# Patient Record
Sex: FEMALE | Race: WHITE | Employment: OTHER | URBAN - METROPOLITAN AREA
[De-identification: names, ages, dates, MRNs, and addresses within clinical notes are randomized per-mention and may not be internally consistent; named-entity substitution may affect disease eponyms.]

---

## 2017-01-01 ENCOUNTER — HOSPITAL ENCOUNTER (OUTPATIENT)
Dept: GENERAL RADIOLOGY | Age: 77
Discharge: HOME | End: 2017-07-12
Attending: INTERNAL MEDICINE
Payer: MEDICARE

## 2017-01-01 ENCOUNTER — APPOINTMENT (OUTPATIENT)
Dept: ULTRASOUND IMAGING | Age: 77
DRG: 167 | End: 2017-01-01
Attending: NURSE PRACTITIONER
Payer: MEDICARE

## 2017-01-01 ENCOUNTER — HOSPITAL ENCOUNTER (INPATIENT)
Age: 77
LOS: 10 days | Discharge: HOSPICE | DRG: 167 | End: 2017-09-25
Attending: INTERNAL MEDICINE | Admitting: INTERNAL MEDICINE
Payer: MEDICARE

## 2017-01-01 ENCOUNTER — HOSPITAL ENCOUNTER (OUTPATIENT)
Dept: LAB | Age: 77
Discharge: HOME | DRG: 167 | End: 2017-09-14
Payer: MEDICARE

## 2017-01-01 ENCOUNTER — APPOINTMENT (OUTPATIENT)
Dept: CT IMAGING | Age: 77
DRG: 167 | End: 2017-01-01
Attending: NURSE PRACTITIONER
Payer: MEDICARE

## 2017-01-01 ENCOUNTER — HOSPITAL ENCOUNTER (OUTPATIENT)
Dept: NUCLEAR MEDICINE | Age: 77
Discharge: HOME | End: 2017-08-14
Attending: INTERNAL MEDICINE
Payer: MEDICARE

## 2017-01-01 ENCOUNTER — HOSPITAL ENCOUNTER (OUTPATIENT)
Dept: NUCLEAR MEDICINE | Age: 77
Discharge: HOME | End: 2017-08-30
Attending: INTERNAL MEDICINE
Payer: MEDICARE

## 2017-01-01 ENCOUNTER — APPOINTMENT (OUTPATIENT)
Dept: GENERAL RADIOLOGY | Age: 77
DRG: 167 | End: 2017-01-01
Attending: INTERNAL MEDICINE
Payer: MEDICARE

## 2017-01-01 ENCOUNTER — HOSPITAL ENCOUNTER (INPATIENT)
Age: 77
LOS: 3 days | End: 2017-09-28
Attending: INTERNAL MEDICINE | Admitting: INTERNAL MEDICINE

## 2017-01-01 ENCOUNTER — HOSPITAL ENCOUNTER (OUTPATIENT)
Dept: GENERAL RADIOLOGY | Age: 77
Discharge: HOME | End: 2017-08-14
Attending: INTERNAL MEDICINE
Payer: MEDICARE

## 2017-01-01 ENCOUNTER — HOSPITAL ENCOUNTER (OUTPATIENT)
Dept: MAMMOGRAPHY | Age: 77
Discharge: HOME | End: 2017-08-14
Attending: INTERNAL MEDICINE
Payer: MEDICARE

## 2017-01-01 ENCOUNTER — HOSPICE ADMISSION (OUTPATIENT)
Dept: HOSPICE | Facility: HOSPICE | Age: 77
End: 2017-01-01
Payer: MEDICARE

## 2017-01-01 VITALS
BODY MASS INDEX: 42.72 KG/M2 | RESPIRATION RATE: 18 BRPM | SYSTOLIC BLOOD PRESSURE: 90 MMHG | TEMPERATURE: 98.6 F | OXYGEN SATURATION: 98 % | HEART RATE: 84 BPM | WEIGHT: 229.8 LBS | DIASTOLIC BLOOD PRESSURE: 63 MMHG

## 2017-01-01 VITALS
HEART RATE: 86 BPM | RESPIRATION RATE: 14 BRPM | DIASTOLIC BLOOD PRESSURE: 42 MMHG | TEMPERATURE: 97.8 F | SYSTOLIC BLOOD PRESSURE: 85 MMHG

## 2017-01-01 DIAGNOSIS — E87.20 METABOLIC ACIDOSIS: ICD-10-CM

## 2017-01-01 DIAGNOSIS — M89.8X9 BONE PAIN: ICD-10-CM

## 2017-01-01 DIAGNOSIS — N18.30 CHRONIC KIDNEY DISEASE (CKD), STAGE III (MODERATE) (HCC): ICD-10-CM

## 2017-01-01 DIAGNOSIS — Z95.0 CARDIAC PACEMAKER: ICD-10-CM

## 2017-01-01 DIAGNOSIS — N17.9 AKI (ACUTE KIDNEY INJURY) (HCC): ICD-10-CM

## 2017-01-01 DIAGNOSIS — E11.9 CONTROLLED TYPE 2 DIABETES MELLITUS WITHOUT COMPLICATION, WITHOUT LONG-TERM CURRENT USE OF INSULIN (HCC): Chronic | ICD-10-CM

## 2017-01-01 DIAGNOSIS — R07.9 LEFT SIDED CHEST PAIN: ICD-10-CM

## 2017-01-01 DIAGNOSIS — N64.4 BREAST PAIN: ICD-10-CM

## 2017-01-01 DIAGNOSIS — R06.00 DYSPNEA: ICD-10-CM

## 2017-01-01 DIAGNOSIS — R05.3 CHRONIC COUGH: ICD-10-CM

## 2017-01-01 DIAGNOSIS — Z87.898 HISTORY OF CHRONIC COUGH: ICD-10-CM

## 2017-01-01 DIAGNOSIS — C34.32 MALIGNANT NEOPLASM OF LOWER LOBE OF LEFT LUNG (HCC): ICD-10-CM

## 2017-01-01 DIAGNOSIS — R91.8 LUNG MASS: ICD-10-CM

## 2017-01-01 DIAGNOSIS — R79.89 ELEVATED SERUM CREATININE: ICD-10-CM

## 2017-01-01 DIAGNOSIS — C80.1 METASTASIS TO BONE OF UNKNOWN PRIMARY (HCC): ICD-10-CM

## 2017-01-01 DIAGNOSIS — R07.81 RIB PAIN: ICD-10-CM

## 2017-01-01 DIAGNOSIS — C79.51 METASTASIS TO BONE OF UNKNOWN PRIMARY (HCC): ICD-10-CM

## 2017-01-01 DIAGNOSIS — C34.92 SMALL CELL LUNG CANCER, LEFT (HCC): ICD-10-CM

## 2017-01-01 DIAGNOSIS — R53.81 DEBILITY: Chronic | ICD-10-CM

## 2017-01-01 DIAGNOSIS — R63.4 WEIGHT LOSS: ICD-10-CM

## 2017-01-01 DIAGNOSIS — J20.9 ACUTE BRONCHITIS, UNSPECIFIED ORGANISM: ICD-10-CM

## 2017-01-01 DIAGNOSIS — E66.01 MORBID OBESITY, UNSPECIFIED OBESITY TYPE (HCC): ICD-10-CM

## 2017-01-01 DIAGNOSIS — R97.8 OTHER ABNORMAL TUMOR MARKERS: ICD-10-CM

## 2017-01-01 DIAGNOSIS — R05.9 COUGH: ICD-10-CM

## 2017-01-01 DIAGNOSIS — Z95.4 S/P AORTIC VALVE REPLACEMENT USING ROSS PROCEDURE: ICD-10-CM

## 2017-01-01 DIAGNOSIS — R53.81 PHYSICAL DEBILITY: ICD-10-CM

## 2017-01-01 DIAGNOSIS — C41.9 METASTATIC BONE CANCER (HCC): ICD-10-CM

## 2017-01-01 DIAGNOSIS — I35.9 AORTIC VALVE DISEASE: ICD-10-CM

## 2017-01-01 DIAGNOSIS — E83.52 HYPERCALCEMIA: ICD-10-CM

## 2017-01-01 LAB
ALBUMIN SERPL-MCNC: 2.4 G/DL (ref 3.2–4.6)
ALBUMIN SERPL-MCNC: 2.5 G/DL (ref 3.2–4.6)
ALBUMIN SERPL-MCNC: 2.6 G/DL (ref 3.2–4.6)
ALBUMIN SERPL-MCNC: 2.8 G/DL (ref 3.2–4.6)
ALBUMIN SERPL-MCNC: 2.9 G/DL (ref 3.2–4.6)
ALBUMIN SERPL-MCNC: 2.9 G/DL (ref 3.2–4.6)
ALBUMIN SERPL-MCNC: 3.7 G/DL (ref 3.2–4.6)
ALBUMIN SERPL-MCNC: 3.8 G/DL (ref 3.2–4.6)
ALBUMIN/GLOB SERPL: 0.7 {RATIO} (ref 1.2–3.5)
ALBUMIN/GLOB SERPL: 0.8 {RATIO} (ref 1.2–3.5)
ALBUMIN/GLOB SERPL: 0.9 {RATIO}
ALBUMIN/GLOB SERPL: 0.9 {RATIO} (ref 1.2–3.5)
ALP SERPL-CCNC: 195 U/L (ref 50–136)
ALP SERPL-CCNC: 203 U/L (ref 50–136)
ALP SERPL-CCNC: 206 U/L (ref 50–136)
ALP SERPL-CCNC: 215 U/L (ref 50–136)
ALP SERPL-CCNC: 218 U/L (ref 50–136)
ALP SERPL-CCNC: 226 U/L (ref 50–136)
ALP SERPL-CCNC: 230 U/L (ref 50–136)
ALP SERPL-CCNC: 236 U/L (ref 50–136)
ALP SERPL-CCNC: 239 U/L (ref 50–136)
ALP SERPL-CCNC: 257 U/L (ref 50–136)
ALT SERPL-CCNC: 37 U/L (ref 12–65)
ALT SERPL-CCNC: 39 U/L (ref 12–65)
ALT SERPL-CCNC: 42 U/L (ref 12–65)
ALT SERPL-CCNC: 46 U/L (ref 12–65)
ALT SERPL-CCNC: 48 U/L (ref 12–65)
ALT SERPL-CCNC: 50 U/L (ref 12–65)
ALT SERPL-CCNC: 51 U/L (ref 12–65)
ALT SERPL-CCNC: 52 U/L (ref 12–65)
ALT SERPL-CCNC: 53 U/L (ref 12–65)
ALT SERPL-CCNC: 54 U/L (ref 12–65)
AMORPH CRY URNS QL MICRO: ABNORMAL
ANION GAP SERPL CALC-SCNC: 10 MMOL/L (ref 7–16)
ANION GAP SERPL CALC-SCNC: 10 MMOL/L (ref 7–16)
ANION GAP SERPL CALC-SCNC: 11 MMOL/L (ref 7–16)
ANION GAP SERPL CALC-SCNC: 12 MMOL/L
ANION GAP SERPL CALC-SCNC: 12 MMOL/L (ref 7–16)
ANION GAP SERPL CALC-SCNC: 9 MMOL/L (ref 7–16)
APPEARANCE UR: ABNORMAL
AST SERPL-CCNC: 100 U/L (ref 15–37)
AST SERPL-CCNC: 105 U/L (ref 15–37)
AST SERPL-CCNC: 108 U/L (ref 15–37)
AST SERPL-CCNC: 110 U/L (ref 15–37)
AST SERPL-CCNC: 112 U/L (ref 15–37)
AST SERPL-CCNC: 114 U/L (ref 15–37)
AST SERPL-CCNC: 116 U/L (ref 15–37)
AST SERPL-CCNC: 122 U/L (ref 15–37)
AST SERPL-CCNC: 89 U/L (ref 15–37)
AST SERPL-CCNC: 99 U/L (ref 15–37)
BACTERIA SPEC CULT: NORMAL
BACTERIA URNS QL MICRO: 0 /HPF
BASOPHILS # BLD: 0 K/UL (ref 0–0.2)
BASOPHILS # BLD: 0.1 K/UL (ref 0–0.2)
BASOPHILS # BLD: 0.1 K/UL (ref 0–0.2)
BASOPHILS NFR BLD: 0 % (ref 0–2)
BASOPHILS NFR BLD: 1 % (ref 0–2)
BASOPHILS NFR BLD: 1 % (ref 0–2)
BILIRUB SERPL-MCNC: 0.5 MG/DL (ref 0.2–1.1)
BILIRUB SERPL-MCNC: 0.6 MG/DL (ref 0.2–1.1)
BILIRUB SERPL-MCNC: 0.7 MG/DL (ref 0.2–1.1)
BILIRUB SERPL-MCNC: 0.7 MG/DL (ref 0.2–1.1)
BILIRUB SERPL-MCNC: 0.8 MG/DL (ref 0.2–1.1)
BILIRUB UR QL: NEGATIVE
BNP SERPL-MCNC: 153 PG/ML
BUN SERPL-MCNC: 13 MG/DL (ref 8–23)
BUN SERPL-MCNC: 15 MG/DL (ref 8–23)
BUN SERPL-MCNC: 18 MG/DL (ref 8–23)
BUN SERPL-MCNC: 18 MG/DL (ref 8–23)
BUN SERPL-MCNC: 19 MG/DL (ref 8–23)
BUN SERPL-MCNC: 22 MG/DL (ref 8–23)
BUN SERPL-MCNC: 24 MG/DL (ref 8–23)
BUN SERPL-MCNC: 28 MG/DL (ref 8–23)
BUN SERPL-MCNC: 34 MG/DL (ref 8–23)
BUN SERPL-MCNC: 39 MG/DL (ref 8–23)
BUN SERPL-MCNC: 44 MG/DL (ref 8–23)
BUN SERPL-MCNC: 47 MG/DL (ref 8–23)
CALCIUM SERPL-MCNC: 10 MG/DL (ref 8.3–10.4)
CALCIUM SERPL-MCNC: 11.5 MG/DL (ref 8.3–10.4)
CALCIUM SERPL-MCNC: 11.6 MG/DL (ref 8.3–10.4)
CALCIUM SERPL-MCNC: 11.7 MG/DL (ref 8.3–10.4)
CALCIUM SERPL-MCNC: 13.2 MG/DL (ref 8.3–10.4)
CALCIUM SERPL-MCNC: 14.1 MG/DL (ref 8.3–10.4)
CALCIUM SERPL-MCNC: 7.1 MG/DL (ref 8.3–10.4)
CALCIUM SERPL-MCNC: 7.2 MG/DL (ref 8.3–10.4)
CALCIUM SERPL-MCNC: 7.7 MG/DL (ref 8.3–10.4)
CALCIUM SERPL-MCNC: 7.8 MG/DL (ref 8.3–10.4)
CALCIUM SERPL-MCNC: 8.4 MG/DL (ref 8.3–10.4)
CALCIUM SERPL-MCNC: 8.4 MG/DL (ref 8.3–10.4)
CANCER AG19-9 SERPL-ACNC: 800.8 U/ML (ref 2–37)
CASTS URNS QL MICRO: ABNORMAL /LPF
CEA SERPL-MCNC: 2016.6 NG/ML (ref 0–3)
CHLORIDE SERPL-SCNC: 104 MMOL/L (ref 98–107)
CHLORIDE SERPL-SCNC: 105 MMOL/L (ref 98–107)
CHLORIDE SERPL-SCNC: 107 MMOL/L (ref 98–107)
CHLORIDE SERPL-SCNC: 109 MMOL/L (ref 98–107)
CHLORIDE SERPL-SCNC: 110 MMOL/L (ref 98–107)
CHLORIDE SERPL-SCNC: 110 MMOL/L (ref 98–107)
CHLORIDE SERPL-SCNC: 111 MMOL/L (ref 98–107)
CHLORIDE SERPL-SCNC: 112 MMOL/L (ref 98–107)
CHLORIDE SERPL-SCNC: 112 MMOL/L (ref 98–107)
CHLORIDE SERPL-SCNC: 113 MMOL/L (ref 98–107)
CHLORIDE SERPL-SCNC: 114 MMOL/L (ref 98–107)
CHLORIDE SERPL-SCNC: 117 MMOL/L (ref 98–107)
CO2 SERPL-SCNC: 17 MMOL/L (ref 21–32)
CO2 SERPL-SCNC: 18 MMOL/L (ref 21–32)
CO2 SERPL-SCNC: 18 MMOL/L (ref 21–32)
CO2 SERPL-SCNC: 19 MMOL/L (ref 21–32)
CO2 SERPL-SCNC: 20 MMOL/L (ref 21–32)
CO2 SERPL-SCNC: 21 MMOL/L (ref 21–32)
CO2 SERPL-SCNC: 21 MMOL/L (ref 21–32)
CO2 SERPL-SCNC: 22 MMOL/L (ref 21–32)
CO2 SERPL-SCNC: 22 MMOL/L (ref 21–32)
COLOR UR: YELLOW
CREAT SERPL-MCNC: 0.92 MG/DL (ref 0.6–1)
CREAT SERPL-MCNC: 1.19 MG/DL (ref 0.6–1)
CREAT SERPL-MCNC: 1.2 MG/DL (ref 0.6–1)
CREAT SERPL-MCNC: 1.27 MG/DL (ref 0.6–1)
CREAT SERPL-MCNC: 1.3 MG/DL (ref 0.6–1)
CREAT SERPL-MCNC: 1.34 MG/DL (ref 0.6–1)
CREAT SERPL-MCNC: 1.68 MG/DL (ref 0.6–1)
CREAT SERPL-MCNC: 1.69 MG/DL (ref 0.6–1)
CREAT SERPL-MCNC: 2.1 MG/DL (ref 0.6–1)
CREAT SERPL-MCNC: 2.23 MG/DL (ref 0.6–1)
CREAT SERPL-MCNC: 2.39 MG/DL (ref 0.6–1)
CREAT SERPL-MCNC: 2.95 MG/DL (ref 0.6–1)
DIFFERENTIAL METHOD BLD: ABNORMAL
EOSINOPHIL # BLD: 0.1 K/UL (ref 0–0.8)
EOSINOPHIL # BLD: 0.2 K/UL (ref 0–0.8)
EOSINOPHIL # BLD: 0.2 K/UL (ref 0–0.8)
EOSINOPHIL # BLD: 0.3 K/UL (ref 0–0.8)
EOSINOPHIL NFR BLD: 1 % (ref 0.5–7.8)
EOSINOPHIL NFR BLD: 2 % (ref 0.5–7.8)
EOSINOPHIL NFR BLD: 3 % (ref 0.5–7.8)
EPI CELLS #/AREA URNS HPF: ABNORMAL /HPF
ERYTHROCYTE [DISTWIDTH] IN BLOOD BY AUTOMATED COUNT: 14.6 % (ref 11.9–14.6)
ERYTHROCYTE [DISTWIDTH] IN BLOOD BY AUTOMATED COUNT: 15.1 % (ref 11.9–14.6)
ERYTHROCYTE [DISTWIDTH] IN BLOOD BY AUTOMATED COUNT: 15.7 % (ref 11.9–14.6)
ERYTHROCYTE [DISTWIDTH] IN BLOOD BY AUTOMATED COUNT: 15.9 % (ref 11.9–14.6)
ERYTHROCYTE [DISTWIDTH] IN BLOOD BY AUTOMATED COUNT: 15.9 % (ref 11.9–14.6)
ERYTHROCYTE [DISTWIDTH] IN BLOOD BY AUTOMATED COUNT: 16.1 % (ref 11.9–14.6)
ERYTHROCYTE [DISTWIDTH] IN BLOOD BY AUTOMATED COUNT: 16.5 % (ref 11.9–14.6)
ERYTHROCYTE [DISTWIDTH] IN BLOOD BY AUTOMATED COUNT: 16.8 % (ref 11.9–14.6)
GLOBULIN SER CALC-MCNC: 3.1 G/DL (ref 2.3–3.5)
GLOBULIN SER CALC-MCNC: 3.3 G/DL (ref 2.3–3.5)
GLOBULIN SER CALC-MCNC: 3.5 G/DL (ref 2.3–3.5)
GLOBULIN SER CALC-MCNC: 3.5 G/DL (ref 2.3–3.5)
GLOBULIN SER CALC-MCNC: 3.6 G/DL (ref 2.3–3.5)
GLOBULIN SER CALC-MCNC: 3.6 G/DL (ref 2.3–3.5)
GLOBULIN SER CALC-MCNC: 3.8 G/DL (ref 2.3–3.5)
GLOBULIN SER CALC-MCNC: 3.9 G/DL (ref 2.3–3.5)
GLOBULIN SER CALC-MCNC: 4.1 G/DL
GLOBULIN SER CALC-MCNC: 4.3 G/DL (ref 2.3–3.5)
GLUCOSE BLD STRIP.AUTO-MCNC: 100 MG/DL (ref 65–100)
GLUCOSE BLD STRIP.AUTO-MCNC: 102 MG/DL (ref 65–100)
GLUCOSE BLD STRIP.AUTO-MCNC: 103 MG/DL (ref 65–100)
GLUCOSE BLD STRIP.AUTO-MCNC: 104 MG/DL (ref 65–100)
GLUCOSE BLD STRIP.AUTO-MCNC: 105 MG/DL (ref 65–100)
GLUCOSE BLD STRIP.AUTO-MCNC: 107 MG/DL (ref 65–100)
GLUCOSE BLD STRIP.AUTO-MCNC: 109 MG/DL (ref 65–100)
GLUCOSE BLD STRIP.AUTO-MCNC: 111 MG/DL (ref 65–100)
GLUCOSE BLD STRIP.AUTO-MCNC: 113 MG/DL (ref 65–100)
GLUCOSE BLD STRIP.AUTO-MCNC: 114 MG/DL (ref 65–100)
GLUCOSE BLD STRIP.AUTO-MCNC: 115 MG/DL (ref 65–100)
GLUCOSE BLD STRIP.AUTO-MCNC: 117 MG/DL (ref 65–100)
GLUCOSE BLD STRIP.AUTO-MCNC: 119 MG/DL (ref 65–100)
GLUCOSE BLD STRIP.AUTO-MCNC: 124 MG/DL (ref 65–100)
GLUCOSE BLD STRIP.AUTO-MCNC: 129 MG/DL (ref 65–100)
GLUCOSE BLD STRIP.AUTO-MCNC: 141 MG/DL (ref 65–100)
GLUCOSE BLD STRIP.AUTO-MCNC: 142 MG/DL (ref 65–100)
GLUCOSE BLD STRIP.AUTO-MCNC: 142 MG/DL (ref 65–100)
GLUCOSE BLD STRIP.AUTO-MCNC: 145 MG/DL (ref 65–100)
GLUCOSE BLD STRIP.AUTO-MCNC: 149 MG/DL (ref 65–100)
GLUCOSE BLD STRIP.AUTO-MCNC: 150 MG/DL (ref 65–100)
GLUCOSE BLD STRIP.AUTO-MCNC: 155 MG/DL (ref 65–100)
GLUCOSE BLD STRIP.AUTO-MCNC: 156 MG/DL (ref 65–100)
GLUCOSE BLD STRIP.AUTO-MCNC: 165 MG/DL (ref 65–100)
GLUCOSE BLD STRIP.AUTO-MCNC: 180 MG/DL (ref 65–100)
GLUCOSE BLD STRIP.AUTO-MCNC: 180 MG/DL (ref 65–100)
GLUCOSE BLD STRIP.AUTO-MCNC: 204 MG/DL (ref 65–100)
GLUCOSE BLD STRIP.AUTO-MCNC: 204 MG/DL (ref 65–100)
GLUCOSE BLD STRIP.AUTO-MCNC: 205 MG/DL (ref 65–100)
GLUCOSE BLD STRIP.AUTO-MCNC: 69 MG/DL (ref 65–100)
GLUCOSE BLD STRIP.AUTO-MCNC: 74 MG/DL (ref 65–100)
GLUCOSE BLD STRIP.AUTO-MCNC: 77 MG/DL (ref 65–100)
GLUCOSE BLD STRIP.AUTO-MCNC: 81 MG/DL (ref 65–100)
GLUCOSE BLD STRIP.AUTO-MCNC: 85 MG/DL (ref 65–100)
GLUCOSE BLD STRIP.AUTO-MCNC: 88 MG/DL (ref 65–100)
GLUCOSE BLD STRIP.AUTO-MCNC: 95 MG/DL (ref 65–100)
GLUCOSE BLD STRIP.AUTO-MCNC: 96 MG/DL (ref 65–100)
GLUCOSE BLD STRIP.AUTO-MCNC: 97 MG/DL (ref 65–100)
GLUCOSE SERPL-MCNC: 100 MG/DL (ref 65–100)
GLUCOSE SERPL-MCNC: 107 MG/DL (ref 65–100)
GLUCOSE SERPL-MCNC: 121 MG/DL (ref 65–100)
GLUCOSE SERPL-MCNC: 125 MG/DL (ref 65–100)
GLUCOSE SERPL-MCNC: 169 MG/DL (ref 65–100)
GLUCOSE SERPL-MCNC: 178 MG/DL (ref 65–100)
GLUCOSE SERPL-MCNC: 73 MG/DL (ref 65–100)
GLUCOSE SERPL-MCNC: 75 MG/DL (ref 65–100)
GLUCOSE SERPL-MCNC: 80 MG/DL (ref 65–100)
GLUCOSE SERPL-MCNC: 80 MG/DL (ref 65–100)
GLUCOSE SERPL-MCNC: 90 MG/DL (ref 65–100)
GLUCOSE SERPL-MCNC: 97 MG/DL (ref 65–100)
GLUCOSE UR STRIP.AUTO-MCNC: NEGATIVE MG/DL
HCT VFR BLD AUTO: 28.4 % (ref 35.8–46.3)
HCT VFR BLD AUTO: 29.2 % (ref 35.8–46.3)
HCT VFR BLD AUTO: 30.8 % (ref 35.8–46.3)
HCT VFR BLD AUTO: 31.2 % (ref 35.8–46.3)
HCT VFR BLD AUTO: 31.7 % (ref 35.8–46.3)
HCT VFR BLD AUTO: 32.1 % (ref 35.8–46.3)
HCT VFR BLD AUTO: 35 % (ref 35.8–46.3)
HCT VFR BLD AUTO: 36.2 % (ref 35.8–46.3)
HGB BLD-MCNC: 10.2 G/DL (ref 11.7–15.4)
HGB BLD-MCNC: 10.3 G/DL (ref 11.7–15.4)
HGB BLD-MCNC: 10.3 G/DL (ref 11.7–15.4)
HGB BLD-MCNC: 10.7 G/DL (ref 11.7–15.4)
HGB BLD-MCNC: 11.9 G/DL (ref 11.7–15.4)
HGB BLD-MCNC: 12.3 G/DL (ref 11.7–15.4)
HGB BLD-MCNC: 9.6 G/DL (ref 11.7–15.4)
HGB BLD-MCNC: 9.7 G/DL (ref 11.7–15.4)
HGB UR QL STRIP: NEGATIVE
IMM GRANULOCYTES # BLD: 0 K/UL (ref 0–0.5)
IMM GRANULOCYTES # BLD: 0.1 K/UL (ref 0–0.5)
IMM GRANULOCYTES # BLD: 0.1 K/UL (ref 0–0.5)
IMM GRANULOCYTES NFR BLD: 0.3 % (ref 0–5)
IMM GRANULOCYTES NFR BLD: 0.4 % (ref 0–5)
IMM GRANULOCYTES NFR BLD: 0.5 % (ref 0–5)
IMM GRANULOCYTES NFR BLD: 0.7 % (ref 0–5)
IMM GRANULOCYTES NFR BLD: 1.2 % (ref 0–5)
KETONES UR QL STRIP.AUTO: NEGATIVE MG/DL
LEUKOCYTE ESTERASE UR QL STRIP.AUTO: ABNORMAL
LYMPHOCYTES # BLD: 1 K/UL (ref 0.5–4.6)
LYMPHOCYTES # BLD: 1.2 K/UL (ref 0.5–4.6)
LYMPHOCYTES # BLD: 1.3 K/UL (ref 0.5–4.6)
LYMPHOCYTES # BLD: 1.9 K/UL (ref 0.5–4.6)
LYMPHOCYTES # BLD: 1.9 K/UL (ref 0.5–4.6)
LYMPHOCYTES # BLD: 2 K/UL (ref 0.5–4.6)
LYMPHOCYTES # BLD: 2.5 K/UL (ref 0.5–4.6)
LYMPHOCYTES # BLD: 2.6 K/UL (ref 0.5–4.6)
LYMPHOCYTES NFR BLD: 18 % (ref 13–44)
LYMPHOCYTES NFR BLD: 21 % (ref 13–44)
LYMPHOCYTES NFR BLD: 21 % (ref 13–44)
LYMPHOCYTES NFR BLD: 22 % (ref 13–44)
LYMPHOCYTES NFR BLD: 22 % (ref 13–44)
LYMPHOCYTES NFR BLD: 23 % (ref 13–44)
LYMPHOCYTES NFR BLD: 26 % (ref 13–44)
LYMPHOCYTES NFR BLD: 27 % (ref 13–44)
MAGNESIUM SERPL-MCNC: 1.5 MG/DL (ref 1.8–2.4)
MAGNESIUM SERPL-MCNC: 1.5 MG/DL (ref 1.8–2.4)
MAGNESIUM SERPL-MCNC: 1.6 MG/DL (ref 1.8–2.4)
MAGNESIUM SERPL-MCNC: 1.8 MG/DL (ref 1.8–2.4)
MAGNESIUM SERPL-MCNC: 2 MG/DL (ref 1.8–2.4)
MAGNESIUM SERPL-MCNC: 2 MG/DL (ref 1.8–2.4)
MAGNESIUM SERPL-MCNC: 2.1 MG/DL (ref 1.8–2.4)
MAGNESIUM SERPL-MCNC: 2.3 MG/DL (ref 1.8–2.4)
MCH RBC QN AUTO: 30.7 PG (ref 26.1–32.9)
MCH RBC QN AUTO: 30.8 PG (ref 26.1–32.9)
MCH RBC QN AUTO: 30.9 PG (ref 26.1–32.9)
MCH RBC QN AUTO: 30.9 PG (ref 26.1–32.9)
MCH RBC QN AUTO: 31.2 PG (ref 26.1–32.9)
MCH RBC QN AUTO: 31.5 PG (ref 26.1–32.9)
MCHC RBC AUTO-ENTMCNC: 32.5 G/DL (ref 31.4–35)
MCHC RBC AUTO-ENTMCNC: 32.7 G/DL (ref 31.4–35)
MCHC RBC AUTO-ENTMCNC: 33.2 G/DL (ref 31.4–35)
MCHC RBC AUTO-ENTMCNC: 33.3 G/DL (ref 31.4–35)
MCHC RBC AUTO-ENTMCNC: 33.4 G/DL (ref 31.4–35)
MCHC RBC AUTO-ENTMCNC: 33.8 G/DL (ref 31.4–35)
MCHC RBC AUTO-ENTMCNC: 34 G/DL (ref 31.4–35)
MCHC RBC AUTO-ENTMCNC: 34 G/DL (ref 31.4–35)
MCV RBC AUTO: 91 FL (ref 79.6–97.8)
MCV RBC AUTO: 91.3 FL (ref 79.6–97.8)
MCV RBC AUTO: 91.9 FL (ref 79.6–97.8)
MCV RBC AUTO: 92.4 FL (ref 79.6–97.8)
MCV RBC AUTO: 92.6 FL (ref 79.6–97.8)
MCV RBC AUTO: 93.6 FL (ref 79.6–97.8)
MCV RBC AUTO: 94.3 FL (ref 79.6–97.8)
MCV RBC AUTO: 94.3 FL (ref 79.6–97.8)
MM INDURATION POC: 0 MM (ref 0–5)
MONOCYTES # BLD: 0.6 K/UL (ref 0.1–1.3)
MONOCYTES # BLD: 0.7 K/UL (ref 0.1–1.3)
MONOCYTES # BLD: 0.8 K/UL (ref 0.1–1.3)
MONOCYTES # BLD: 1 K/UL (ref 0.1–1.3)
MONOCYTES # BLD: 1.5 K/UL (ref 0.1–1.3)
MONOCYTES # BLD: 1.8 K/UL (ref 0.1–1.3)
MONOCYTES NFR BLD: 10 % (ref 4–12)
MONOCYTES NFR BLD: 11 % (ref 4–12)
MONOCYTES NFR BLD: 13 % (ref 4–12)
MONOCYTES NFR BLD: 13 % (ref 4–12)
MONOCYTES NFR BLD: 16 % (ref 4–12)
MONOCYTES NFR BLD: 18 % (ref 4–12)
MUCOUS THREADS URNS QL MICRO: ABNORMAL /LPF
NEUTS SEG # BLD: 3.5 K/UL (ref 1.7–8.2)
NEUTS SEG # BLD: 3.6 K/UL (ref 1.7–8.2)
NEUTS SEG # BLD: 3.8 K/UL (ref 1.7–8.2)
NEUTS SEG # BLD: 5.4 K/UL (ref 1.7–8.2)
NEUTS SEG # BLD: 5.5 K/UL (ref 1.7–8.2)
NEUTS SEG # BLD: 5.6 K/UL (ref 1.7–8.2)
NEUTS SEG NFR BLD: 51 % (ref 43–78)
NEUTS SEG NFR BLD: 59 % (ref 43–78)
NEUTS SEG NFR BLD: 60 % (ref 43–78)
NEUTS SEG NFR BLD: 62 % (ref 43–78)
NEUTS SEG NFR BLD: 63 % (ref 43–78)
NEUTS SEG NFR BLD: 66 % (ref 43–78)
NEUTS SEG NFR BLD: 66 % (ref 43–78)
NEUTS SEG NFR BLD: 68 % (ref 43–78)
NITRITE UR QL STRIP.AUTO: NEGATIVE
NRBC # BLD: 0 K/UL (ref 0–0.2)
PH UR STRIP: 5 [PH] (ref 5–9)
PLATELET # BLD AUTO: 123 K/UL (ref 150–450)
PLATELET # BLD AUTO: 124 K/UL (ref 150–450)
PLATELET # BLD AUTO: 131 K/UL (ref 150–450)
PLATELET # BLD AUTO: 150 K/UL (ref 150–450)
PLATELET # BLD AUTO: 155 K/UL (ref 150–450)
PLATELET # BLD AUTO: 82 K/UL (ref 150–450)
PLATELET # BLD AUTO: 87 K/UL (ref 150–450)
PLATELET # BLD AUTO: 98 K/UL (ref 150–450)
PMV BLD AUTO: 10.2 FL (ref 10.8–14.1)
PMV BLD AUTO: 10.3 FL (ref 10.8–14.1)
PMV BLD AUTO: 10.5 FL (ref 10.8–14.1)
PMV BLD AUTO: 10.6 FL (ref 10.8–14.1)
PMV BLD AUTO: 10.7 FL (ref 10.8–14.1)
PMV BLD AUTO: 9.8 FL (ref 10.8–14.1)
POTASSIUM SERPL-SCNC: 3.5 MMOL/L (ref 3.5–5.1)
POTASSIUM SERPL-SCNC: 3.8 MMOL/L (ref 3.5–5.1)
POTASSIUM SERPL-SCNC: 3.9 MMOL/L (ref 3.5–5.1)
POTASSIUM SERPL-SCNC: 4 MMOL/L (ref 3.5–5.1)
POTASSIUM SERPL-SCNC: 4 MMOL/L (ref 3.5–5.1)
POTASSIUM SERPL-SCNC: 4.1 MMOL/L (ref 3.5–5.1)
POTASSIUM SERPL-SCNC: 4.2 MMOL/L (ref 3.5–5.1)
POTASSIUM SERPL-SCNC: 4.3 MMOL/L (ref 3.5–5.1)
PPD POC: NEGATIVE NEGATIVE
PROT SERPL-MCNC: 5.5 G/DL (ref 6.3–8.2)
PROT SERPL-MCNC: 5.9 G/DL (ref 6.3–8.2)
PROT SERPL-MCNC: 6 G/DL (ref 6.3–8.2)
PROT SERPL-MCNC: 6.2 G/DL (ref 6.3–8.2)
PROT SERPL-MCNC: 6.4 G/DL (ref 6.3–8.2)
PROT SERPL-MCNC: 6.8 G/DL (ref 6.3–8.2)
PROT SERPL-MCNC: 7.8 G/DL (ref 6.3–8.2)
PROT SERPL-MCNC: 8.1 G/DL (ref 6.3–8.2)
PROT UR STRIP-MCNC: NEGATIVE MG/DL
RBC # BLD AUTO: 3.11 M/UL (ref 4.05–5.25)
RBC # BLD AUTO: 3.16 M/UL (ref 4.05–5.25)
RBC # BLD AUTO: 3.31 M/UL (ref 4.05–5.25)
RBC # BLD AUTO: 3.35 M/UL (ref 4.05–5.25)
RBC # BLD AUTO: 3.36 M/UL (ref 4.05–5.25)
RBC # BLD AUTO: 3.43 M/UL (ref 4.05–5.25)
RBC # BLD AUTO: 3.78 M/UL (ref 4.05–5.25)
RBC # BLD AUTO: 3.98 M/UL (ref 4.05–5.25)
RBC #/AREA URNS HPF: ABNORMAL /HPF
SERVICE CMNT-IMP: NORMAL
SODIUM SERPL-SCNC: 135 MMOL/L (ref 136–145)
SODIUM SERPL-SCNC: 136 MMOL/L (ref 136–145)
SODIUM SERPL-SCNC: 138 MMOL/L (ref 136–145)
SODIUM SERPL-SCNC: 140 MMOL/L (ref 136–145)
SODIUM SERPL-SCNC: 140 MMOL/L (ref 136–145)
SODIUM SERPL-SCNC: 141 MMOL/L (ref 136–145)
SODIUM SERPL-SCNC: 142 MMOL/L (ref 136–145)
SODIUM SERPL-SCNC: 143 MMOL/L (ref 136–145)
SODIUM SERPL-SCNC: 143 MMOL/L (ref 136–145)
SODIUM SERPL-SCNC: 145 MMOL/L (ref 136–145)
SP GR UR REFRACTOMETRY: 1.02 (ref 1–1.02)
UROBILINOGEN UR QL STRIP.AUTO: 0.2 EU/DL (ref 0.2–1)
WBC # BLD AUTO: 10.2 K/UL (ref 4.3–11.1)
WBC # BLD AUTO: 5.2 K/UL (ref 4.3–11.1)
WBC # BLD AUTO: 5.7 K/UL (ref 4.3–11.1)
WBC # BLD AUTO: 6.1 K/UL (ref 4.3–11.1)
WBC # BLD AUTO: 8.3 K/UL (ref 4.3–11.1)
WBC # BLD AUTO: 8.3 K/UL (ref 4.3–11.1)
WBC # BLD AUTO: 9.3 K/UL (ref 4.3–11.1)
WBC # BLD AUTO: 9.5 K/UL (ref 4.3–11.1)
WBC URNS QL MICRO: ABNORMAL /HPF

## 2017-01-01 PROCEDURE — 93971 EXTREMITY STUDY: CPT

## 2017-01-01 PROCEDURE — 99233 SBSQ HOSP IP/OBS HIGH 50: CPT | Performed by: INTERNAL MEDICINE

## 2017-01-01 PROCEDURE — 77030003406 HC NDL ASPIR BIOP OCOA -C: Performed by: INTERNAL MEDICINE

## 2017-01-01 PROCEDURE — 85025 COMPLETE CBC W/AUTO DIFF WBC: CPT | Performed by: NURSE PRACTITIONER

## 2017-01-01 PROCEDURE — 74011250636 HC RX REV CODE- 250/636: Performed by: NURSE PRACTITIONER

## 2017-01-01 PROCEDURE — 82962 GLUCOSE BLOOD TEST: CPT

## 2017-01-01 PROCEDURE — 78306 BONE IMAGING WHOLE BODY: CPT

## 2017-01-01 PROCEDURE — 77010033678 HC OXYGEN DAILY

## 2017-01-01 PROCEDURE — 80053 COMPREHEN METABOLIC PANEL: CPT | Performed by: NURSE PRACTITIONER

## 2017-01-01 PROCEDURE — 99232 SBSQ HOSP IP/OBS MODERATE 35: CPT | Performed by: INTERNAL MEDICINE

## 2017-01-01 PROCEDURE — 76040000025: Performed by: INTERNAL MEDICINE

## 2017-01-01 PROCEDURE — 74011250637 HC RX REV CODE- 250/637: Performed by: INTERNAL MEDICINE

## 2017-01-01 PROCEDURE — 74011250637 HC RX REV CODE- 250/637: Performed by: NURSE PRACTITIONER

## 2017-01-01 PROCEDURE — 99153 MOD SED SAME PHYS/QHP EA: CPT | Performed by: INTERNAL MEDICINE

## 2017-01-01 PROCEDURE — 74011250636 HC RX REV CODE- 250/636: Performed by: INTERNAL MEDICINE

## 2017-01-01 PROCEDURE — 65270000029 HC RM PRIVATE

## 2017-01-01 PROCEDURE — 94760 N-INVAS EAR/PLS OXIMETRY 1: CPT

## 2017-01-01 PROCEDURE — 74011636320 HC RX REV CODE- 636/320: Performed by: INTERNAL MEDICINE

## 2017-01-01 PROCEDURE — 74011250636 HC RX REV CODE- 250/636

## 2017-01-01 PROCEDURE — 83735 ASSAY OF MAGNESIUM: CPT | Performed by: NURSE PRACTITIONER

## 2017-01-01 PROCEDURE — 88342 IMHCHEM/IMCYTCHM 1ST ANTB: CPT | Performed by: INTERNAL MEDICINE

## 2017-01-01 PROCEDURE — 74011000258 HC RX REV CODE- 258: Performed by: INTERNAL MEDICINE

## 2017-01-01 PROCEDURE — 99223 1ST HOSP IP/OBS HIGH 75: CPT | Performed by: INTERNAL MEDICINE

## 2017-01-01 PROCEDURE — 36592 COLLECT BLOOD FROM PICC: CPT

## 2017-01-01 PROCEDURE — 80048 BASIC METABOLIC PNL TOTAL CA: CPT | Performed by: NURSE PRACTITIONER

## 2017-01-01 PROCEDURE — 99221 1ST HOSP IP/OBS SF/LOW 40: CPT | Performed by: INTERNAL MEDICINE

## 2017-01-01 PROCEDURE — 74011000250 HC RX REV CODE- 250: Performed by: INTERNAL MEDICINE

## 2017-01-01 PROCEDURE — 71020 XR CHEST PA LAT: CPT

## 2017-01-01 PROCEDURE — 71260 CT THORAX DX C+: CPT

## 2017-01-01 PROCEDURE — 07B74ZX EXCISION OF THORAX LYMPHATIC, PERCUTANEOUS ENDOSCOPIC APPROACH, DIAGNOSTIC: ICD-10-PCS | Performed by: INTERNAL MEDICINE

## 2017-01-01 PROCEDURE — 88305 TISSUE EXAM BY PATHOLOGIST: CPT | Performed by: INTERNAL MEDICINE

## 2017-01-01 PROCEDURE — 74011000250 HC RX REV CODE- 250: Performed by: NURSE PRACTITIONER

## 2017-01-01 PROCEDURE — 71010 XR CHEST SNGL V: CPT

## 2017-01-01 PROCEDURE — 99239 HOSP IP/OBS DSCHRG MGMT >30: CPT | Performed by: INTERNAL MEDICINE

## 2017-01-01 PROCEDURE — 97162 PT EVAL MOD COMPLEX 30 MIN: CPT

## 2017-01-01 PROCEDURE — 74011636637 HC RX REV CODE- 636/637: Performed by: NURSE PRACTITIONER

## 2017-01-01 PROCEDURE — 3336500001 HSPC ELECTION

## 2017-01-01 PROCEDURE — 74011000258 HC RX REV CODE- 258: Performed by: NURSE PRACTITIONER

## 2017-01-01 PROCEDURE — 94640 AIRWAY INHALATION TREATMENT: CPT

## 2017-01-01 PROCEDURE — 77030009046 HC CATH BRNCH BLLN OCOA -B: Performed by: INTERNAL MEDICINE

## 2017-01-01 PROCEDURE — 83880 ASSAY OF NATRIURETIC PEPTIDE: CPT | Performed by: NURSE PRACTITIONER

## 2017-01-01 PROCEDURE — C1751 CATH, INF, PER/CENT/MIDLINE: HCPCS

## 2017-01-01 PROCEDURE — 88172 CYTP DX EVAL FNA 1ST EA SITE: CPT | Performed by: INTERNAL MEDICINE

## 2017-01-01 PROCEDURE — 0656 HSPC GENERAL INPATIENT

## 2017-01-01 PROCEDURE — 97165 OT EVAL LOW COMPLEX 30 MIN: CPT

## 2017-01-01 PROCEDURE — 78582 LUNG VENTILAT&PERFUS IMAGING: CPT

## 2017-01-01 PROCEDURE — 77030018719 HC DRSG PTCH ANTIMIC J&J -A

## 2017-01-01 PROCEDURE — 36415 COLL VENOUS BLD VENIPUNCTURE: CPT | Performed by: NURSE PRACTITIONER

## 2017-01-01 PROCEDURE — 88341 IMHCHEM/IMCYTCHM EA ADD ANTB: CPT | Performed by: INTERNAL MEDICINE

## 2017-01-01 PROCEDURE — 31652 BRONCH EBUS SAMPLNG 1/2 NODE: CPT | Performed by: INTERNAL MEDICINE

## 2017-01-01 PROCEDURE — 77066 DX MAMMO INCL CAD BI: CPT

## 2017-01-01 PROCEDURE — 36591 DRAW BLOOD OFF VENOUS DEVICE: CPT

## 2017-01-01 PROCEDURE — G0500 MOD SEDAT ENDO SERVICE >5YRS: HCPCS | Performed by: INTERNAL MEDICINE

## 2017-01-01 PROCEDURE — 74011000250 HC RX REV CODE- 250

## 2017-01-01 PROCEDURE — 77030013131 HC IV BLD ST ICUM -A

## 2017-01-01 PROCEDURE — 97530 THERAPEUTIC ACTIVITIES: CPT

## 2017-01-01 PROCEDURE — 88173 CYTOPATH EVAL FNA REPORT: CPT | Performed by: INTERNAL MEDICINE

## 2017-01-01 PROCEDURE — 86580 TB INTRADERMAL TEST: CPT | Performed by: INTERNAL MEDICINE

## 2017-01-01 PROCEDURE — 74011000302 HC RX REV CODE- 302: Performed by: INTERNAL MEDICINE

## 2017-01-01 PROCEDURE — 76937 US GUIDE VASCULAR ACCESS: CPT

## 2017-01-01 PROCEDURE — 88177 CYTP FNA EVAL EA ADDL: CPT | Performed by: INTERNAL MEDICINE

## 2017-01-01 PROCEDURE — 02HV33Z INSERTION OF INFUSION DEVICE INTO SUPERIOR VENA CAVA, PERCUTANEOUS APPROACH: ICD-10-PCS | Performed by: INTERNAL MEDICINE

## 2017-01-01 PROCEDURE — 77030018786 HC NDL GD F/USND BARD -B

## 2017-01-01 PROCEDURE — 99221 1ST HOSP IP/OBS SF/LOW 40: CPT | Performed by: PHYSICAL MEDICINE & REHABILITATION

## 2017-01-01 PROCEDURE — 87086 URINE CULTURE/COLONY COUNT: CPT | Performed by: NURSE PRACTITIONER

## 2017-01-01 PROCEDURE — 77030012699 HC VLV SUC CNTRL OCOA -A: Performed by: INTERNAL MEDICINE

## 2017-01-01 PROCEDURE — 81001 URINALYSIS AUTO W/SCOPE: CPT | Performed by: NURSE PRACTITIONER

## 2017-01-01 PROCEDURE — 36569 INSJ PICC 5 YR+ W/O IMAGING: CPT | Performed by: INTERNAL MEDICINE

## 2017-01-01 RX ORDER — MAGNESIUM SULFATE HEPTAHYDRATE 40 MG/ML
2 INJECTION, SOLUTION INTRAVENOUS ONCE
Status: COMPLETED | OUTPATIENT
Start: 2017-01-01 | End: 2017-01-01

## 2017-01-01 RX ORDER — MIDAZOLAM HYDROCHLORIDE 1 MG/ML
.25-5 INJECTION, SOLUTION INTRAMUSCULAR; INTRAVENOUS
Status: DISCONTINUED | OUTPATIENT
Start: 2017-01-01 | End: 2017-01-01 | Stop reason: HOSPADM

## 2017-01-01 RX ORDER — SODIUM CHLORIDE 0.9 % (FLUSH) 0.9 %
10 SYRINGE (ML) INJECTION AS NEEDED
Status: DISCONTINUED | OUTPATIENT
Start: 2017-01-01 | End: 2017-01-01 | Stop reason: HOSPADM

## 2017-01-01 RX ORDER — FUROSEMIDE 20 MG/1
20 TABLET ORAL ONCE
Status: COMPLETED | OUTPATIENT
Start: 2017-01-01 | End: 2017-01-01

## 2017-01-01 RX ORDER — SODIUM CHLORIDE 9 MG/ML
75 INJECTION, SOLUTION INTRAVENOUS CONTINUOUS
Status: DISCONTINUED | OUTPATIENT
Start: 2017-01-01 | End: 2017-01-01

## 2017-01-01 RX ORDER — SODIUM CHLORIDE 0.9 % (FLUSH) 0.9 %
10 SYRINGE (ML) INJECTION EVERY 12 HOURS
Status: DISCONTINUED | OUTPATIENT
Start: 2017-01-01 | End: 2017-01-01 | Stop reason: HOSPADM

## 2017-01-01 RX ORDER — FACIAL-BODY WIPES
10 EACH TOPICAL AS NEEDED
Status: DISCONTINUED | OUTPATIENT
Start: 2017-01-01 | End: 2017-01-01 | Stop reason: HOSPADM

## 2017-01-01 RX ORDER — MORPHINE SULFATE 2 MG/ML
4 INJECTION, SOLUTION INTRAMUSCULAR; INTRAVENOUS
Status: DISCONTINUED | OUTPATIENT
Start: 2017-01-01 | End: 2017-01-01

## 2017-01-01 RX ORDER — ONDANSETRON 8 MG/1
8 TABLET, ORALLY DISINTEGRATING ORAL
COMMUNITY

## 2017-01-01 RX ORDER — HYDROMORPHONE HYDROCHLORIDE 1 MG/ML
0.5 INJECTION, SOLUTION INTRAMUSCULAR; INTRAVENOUS; SUBCUTANEOUS
Status: DISCONTINUED | OUTPATIENT
Start: 2017-01-01 | End: 2017-01-01

## 2017-01-01 RX ORDER — VALSARTAN AND HYDROCHLOROTHIAZIDE 320; 25 MG/1; MG/1
1 TABLET, FILM COATED ORAL DAILY
COMMUNITY
Start: 2017-01-01

## 2017-01-01 RX ORDER — HEPARIN 100 UNIT/ML
600 SYRINGE INTRAVENOUS AS NEEDED
Status: DISCONTINUED | OUTPATIENT
Start: 2017-01-01 | End: 2017-01-01 | Stop reason: HOSPADM

## 2017-01-01 RX ORDER — MORPHINE SULFATE 2 MG/ML
2 INJECTION, SOLUTION INTRAMUSCULAR; INTRAVENOUS
Status: DISCONTINUED | OUTPATIENT
Start: 2017-01-01 | End: 2017-01-01

## 2017-01-01 RX ORDER — FUROSEMIDE 40 MG/1
40 TABLET ORAL DAILY
Status: DISCONTINUED | OUTPATIENT
Start: 2017-01-01 | End: 2017-01-01

## 2017-01-01 RX ORDER — FUROSEMIDE 10 MG/ML
20 INJECTION INTRAMUSCULAR; INTRAVENOUS ONCE
Status: COMPLETED | OUTPATIENT
Start: 2017-01-01 | End: 2017-01-01

## 2017-01-01 RX ORDER — HALOPERIDOL 5 MG/ML
2 INJECTION INTRAMUSCULAR
Status: DISCONTINUED | OUTPATIENT
Start: 2017-01-01 | End: 2017-01-01 | Stop reason: HOSPADM

## 2017-01-01 RX ORDER — GLYBURIDE 2.5 MG/1
1.25 TABLET ORAL 2 TIMES DAILY WITH MEALS
COMMUNITY
Start: 2017-01-01

## 2017-01-01 RX ORDER — AMLODIPINE BESYLATE 5 MG/1
2.5 TABLET ORAL DAILY
COMMUNITY
Start: 2017-01-01

## 2017-01-01 RX ORDER — SODIUM CHLORIDE 9 MG/ML
100 INJECTION, SOLUTION INTRAVENOUS CONTINUOUS
Status: DISCONTINUED | OUTPATIENT
Start: 2017-01-01 | End: 2017-01-01

## 2017-01-01 RX ORDER — IPRATROPIUM BROMIDE 0.5 MG/2.5ML
0.5 SOLUTION RESPIRATORY (INHALATION)
Status: DISCONTINUED | OUTPATIENT
Start: 2017-01-01 | End: 2017-01-01 | Stop reason: HOSPADM

## 2017-01-01 RX ORDER — ALBUTEROL SULFATE 0.83 MG/ML
2.5 SOLUTION RESPIRATORY (INHALATION)
Status: DISCONTINUED | OUTPATIENT
Start: 2017-01-01 | End: 2017-01-01 | Stop reason: HOSPADM

## 2017-01-01 RX ORDER — HYDROCODONE BITARTRATE AND ACETAMINOPHEN 5; 325 MG/1; MG/1
1 TABLET ORAL
Status: DISCONTINUED | OUTPATIENT
Start: 2017-01-01 | End: 2017-01-01

## 2017-01-01 RX ORDER — OXYCODONE HYDROCHLORIDE 5 MG/1
5 TABLET ORAL EVERY 6 HOURS
Status: DISCONTINUED | OUTPATIENT
Start: 2017-01-01 | End: 2017-01-01 | Stop reason: HOSPADM

## 2017-01-01 RX ORDER — PANTOPRAZOLE SODIUM 40 MG/1
40 TABLET, DELAYED RELEASE ORAL
Status: DISCONTINUED | OUTPATIENT
Start: 2017-01-01 | End: 2017-01-01 | Stop reason: HOSPADM

## 2017-01-01 RX ORDER — MORPHINE SULFATE 4 MG/ML
4 INJECTION, SOLUTION INTRAMUSCULAR; INTRAVENOUS
Status: DISCONTINUED | OUTPATIENT
Start: 2017-01-01 | End: 2017-01-01

## 2017-01-01 RX ORDER — BUDESONIDE 0.5 MG/2ML
500 INHALANT ORAL
Status: DISCONTINUED | OUTPATIENT
Start: 2017-01-01 | End: 2017-01-01 | Stop reason: HOSPADM

## 2017-01-01 RX ORDER — MORPHINE SULFATE 10 MG/ML
8 INJECTION, SOLUTION INTRAMUSCULAR; INTRAVENOUS
Status: DISCONTINUED | OUTPATIENT
Start: 2017-01-01 | End: 2017-01-01 | Stop reason: HOSPADM

## 2017-01-01 RX ORDER — ONDANSETRON 8 MG/1
8 TABLET, ORALLY DISINTEGRATING ORAL
Status: DISCONTINUED | OUTPATIENT
Start: 2017-01-01 | End: 2017-01-01 | Stop reason: HOSPADM

## 2017-01-01 RX ORDER — HYDROMORPHONE HYDROCHLORIDE 1 MG/ML
1 INJECTION, SOLUTION INTRAMUSCULAR; INTRAVENOUS; SUBCUTANEOUS
Status: DISCONTINUED | OUTPATIENT
Start: 2017-01-01 | End: 2017-01-01 | Stop reason: HOSPADM

## 2017-01-01 RX ORDER — AMLODIPINE BESYLATE 5 MG/1
5 TABLET ORAL DAILY
Status: DISCONTINUED | OUTPATIENT
Start: 2017-01-01 | End: 2017-01-01 | Stop reason: HOSPADM

## 2017-01-01 RX ORDER — CALCIUM CARBONATE 200(500)MG
200 TABLET,CHEWABLE ORAL
Status: DISCONTINUED | OUTPATIENT
Start: 2017-01-01 | End: 2017-01-01 | Stop reason: HOSPADM

## 2017-01-01 RX ORDER — VALSARTAN 320 MG/1
320 TABLET ORAL DAILY
Status: DISCONTINUED | OUTPATIENT
Start: 2017-01-01 | End: 2017-01-01

## 2017-01-01 RX ORDER — SODIUM CHLORIDE 0.9 % (FLUSH) 0.9 %
20 SYRINGE (ML) INJECTION EVERY 8 HOURS
Status: DISCONTINUED | OUTPATIENT
Start: 2017-01-01 | End: 2017-01-01 | Stop reason: HOSPADM

## 2017-01-01 RX ORDER — SODIUM CHLORIDE 0.9 % (FLUSH) 0.9 %
20 SYRINGE (ML) INJECTION AS NEEDED
Status: DISCONTINUED | OUTPATIENT
Start: 2017-01-01 | End: 2017-01-01 | Stop reason: HOSPADM

## 2017-01-01 RX ORDER — HEPARIN 100 UNIT/ML
300 SYRINGE INTRAVENOUS EVERY 12 HOURS
Status: DISCONTINUED | OUTPATIENT
Start: 2017-01-01 | End: 2017-01-01 | Stop reason: HOSPADM

## 2017-01-01 RX ORDER — SODIUM CHLORIDE 9 MG/ML
1000 INJECTION, SOLUTION INTRAVENOUS ONCE
Status: COMPLETED | OUTPATIENT
Start: 2017-01-01 | End: 2017-01-01

## 2017-01-01 RX ORDER — HYDROMORPHONE HYDROCHLORIDE 1 MG/ML
1 INJECTION, SOLUTION INTRAMUSCULAR; INTRAVENOUS; SUBCUTANEOUS
Status: DISCONTINUED | OUTPATIENT
Start: 2017-01-01 | End: 2017-01-01

## 2017-01-01 RX ORDER — METFORMIN HYDROCHLORIDE 500 MG/1
500 TABLET ORAL DAILY
Status: DISCONTINUED | OUTPATIENT
Start: 2017-01-01 | End: 2017-01-01

## 2017-01-01 RX ORDER — LIDOCAINE HYDROCHLORIDE 20 MG/ML
JELLY TOPICAL ONCE
Status: COMPLETED | OUTPATIENT
Start: 2017-01-01 | End: 2017-01-01

## 2017-01-01 RX ORDER — LORAZEPAM 2 MG/ML
2 INJECTION INTRAMUSCULAR
Status: DISCONTINUED | OUTPATIENT
Start: 2017-01-01 | End: 2017-01-01 | Stop reason: HOSPADM

## 2017-01-01 RX ORDER — DOCUSATE SODIUM 100 MG/1
100 CAPSULE, LIQUID FILLED ORAL DAILY
Status: DISCONTINUED | OUTPATIENT
Start: 2017-01-01 | End: 2017-01-01

## 2017-01-01 RX ORDER — FENTANYL CITRATE 50 UG/ML
100 INJECTION, SOLUTION INTRAMUSCULAR; INTRAVENOUS
Status: DISCONTINUED | OUTPATIENT
Start: 2017-01-01 | End: 2017-01-01 | Stop reason: HOSPADM

## 2017-01-01 RX ORDER — FENTANYL 25 UG/1
1 PATCH TRANSDERMAL
Status: DISCONTINUED | OUTPATIENT
Start: 2017-01-01 | End: 2017-01-01 | Stop reason: HOSPADM

## 2017-01-01 RX ORDER — LANOLIN ALCOHOL/MO/W.PET/CERES
400 CREAM (GRAM) TOPICAL 3 TIMES DAILY
Status: DISCONTINUED | OUTPATIENT
Start: 2017-01-01 | End: 2017-01-01 | Stop reason: HOSPADM

## 2017-01-01 RX ORDER — ACETAMINOPHEN 650 MG/1
650 SUPPOSITORY RECTAL
Status: DISCONTINUED | OUTPATIENT
Start: 2017-01-01 | End: 2017-01-01 | Stop reason: HOSPADM

## 2017-01-01 RX ORDER — AMOXICILLIN 250 MG
2 CAPSULE ORAL 2 TIMES DAILY
Status: DISCONTINUED | OUTPATIENT
Start: 2017-01-01 | End: 2017-01-01 | Stop reason: HOSPADM

## 2017-01-01 RX ORDER — POLYETHYLENE GLYCOL 3350 17 G/17G
17 POWDER, FOR SOLUTION ORAL DAILY
Status: DISCONTINUED | OUTPATIENT
Start: 2017-01-01 | End: 2017-01-01 | Stop reason: HOSPADM

## 2017-01-01 RX ORDER — IPRATROPIUM BROMIDE AND ALBUTEROL SULFATE 2.5; .5 MG/3ML; MG/3ML
3 SOLUTION RESPIRATORY (INHALATION)
Status: DISCONTINUED | OUTPATIENT
Start: 2017-01-01 | End: 2017-01-01 | Stop reason: HOSPADM

## 2017-01-01 RX ORDER — KETOROLAC TROMETHAMINE 15 MG/ML
15 INJECTION, SOLUTION INTRAMUSCULAR; INTRAVENOUS ONCE
Status: COMPLETED | OUTPATIENT
Start: 2017-01-01 | End: 2017-01-01

## 2017-01-01 RX ORDER — OMEPRAZOLE 20 MG/1
20 CAPSULE, DELAYED RELEASE ORAL DAILY
COMMUNITY
Start: 2017-01-01

## 2017-01-01 RX ORDER — INSULIN LISPRO 100 [IU]/ML
INJECTION, SOLUTION INTRAVENOUS; SUBCUTANEOUS
Status: DISCONTINUED | OUTPATIENT
Start: 2017-01-01 | End: 2017-01-01 | Stop reason: HOSPADM

## 2017-01-01 RX ORDER — FUROSEMIDE 10 MG/ML
40 INJECTION INTRAMUSCULAR; INTRAVENOUS DAILY
Status: DISCONTINUED | OUTPATIENT
Start: 2017-01-01 | End: 2017-01-01

## 2017-01-01 RX ORDER — HEPARIN 100 UNIT/ML
600 SYRINGE INTRAVENOUS EVERY 8 HOURS
Status: DISCONTINUED | OUTPATIENT
Start: 2017-01-01 | End: 2017-01-01 | Stop reason: HOSPADM

## 2017-01-01 RX ORDER — CALCITONIN SALMON 200 [USP'U]/ML
400 INJECTION, SOLUTION INTRAMUSCULAR; SUBCUTANEOUS EVERY 12 HOURS
Status: COMPLETED | OUTPATIENT
Start: 2017-01-01 | End: 2017-01-01

## 2017-01-01 RX ORDER — SODIUM CHLORIDE 0.9 % (FLUSH) 0.9 %
10 SYRINGE (ML) INJECTION
Status: COMPLETED | OUTPATIENT
Start: 2017-01-01 | End: 2017-01-01

## 2017-01-01 RX ORDER — HYDROCODONE BITARTRATE AND ACETAMINOPHEN 5; 325 MG/1; MG/1
1 TABLET ORAL
COMMUNITY
Start: 2017-01-01

## 2017-01-01 RX ORDER — HEPARIN 100 UNIT/ML
300 SYRINGE INTRAVENOUS AS NEEDED
Status: DISCONTINUED | OUTPATIENT
Start: 2017-01-01 | End: 2017-01-01 | Stop reason: HOSPADM

## 2017-01-01 RX ORDER — GLYCOPYRROLATE 0.2 MG/ML
0.2 INJECTION INTRAMUSCULAR; INTRAVENOUS
Status: DISCONTINUED | OUTPATIENT
Start: 2017-01-01 | End: 2017-01-01 | Stop reason: HOSPADM

## 2017-01-01 RX ORDER — DIPHENHYDRAMINE HYDROCHLORIDE 50 MG/ML
25 INJECTION, SOLUTION INTRAMUSCULAR; INTRAVENOUS
Status: DISCONTINUED | OUTPATIENT
Start: 2017-01-01 | End: 2017-01-01 | Stop reason: HOSPADM

## 2017-01-01 RX ORDER — SODIUM CHLORIDE 0.9 % (FLUSH) 0.9 %
5-10 SYRINGE (ML) INJECTION AS NEEDED
Status: DISCONTINUED | OUTPATIENT
Start: 2017-01-01 | End: 2017-01-01 | Stop reason: HOSPADM

## 2017-01-01 RX ORDER — MIRTAZAPINE 7.5 MG/1
7.5 TABLET, FILM COATED ORAL
COMMUNITY
Start: 2017-01-01

## 2017-01-01 RX ORDER — SODIUM CHLORIDE 0.9 % (FLUSH) 0.9 %
5-10 SYRINGE (ML) INJECTION EVERY 8 HOURS
Status: DISCONTINUED | OUTPATIENT
Start: 2017-01-01 | End: 2017-01-01 | Stop reason: HOSPADM

## 2017-01-01 RX ORDER — ONDANSETRON 2 MG/ML
4 INJECTION INTRAMUSCULAR; INTRAVENOUS
Status: DISCONTINUED | OUTPATIENT
Start: 2017-01-01 | End: 2017-01-01 | Stop reason: HOSPADM

## 2017-01-01 RX ORDER — GLYBURIDE 2.5 MG/1
1.25 TABLET ORAL 2 TIMES DAILY WITH MEALS
Status: DISCONTINUED | OUTPATIENT
Start: 2017-01-01 | End: 2017-01-01 | Stop reason: HOSPADM

## 2017-01-01 RX ORDER — MIRTAZAPINE 15 MG/1
7.5 TABLET, FILM COATED ORAL
Status: DISCONTINUED | OUTPATIENT
Start: 2017-01-01 | End: 2017-01-01 | Stop reason: HOSPADM

## 2017-01-01 RX ORDER — HYDROCODONE BITARTRATE AND ACETAMINOPHEN 7.5; 325 MG/1; MG/1
1 TABLET ORAL
Status: DISCONTINUED | OUTPATIENT
Start: 2017-01-01 | End: 2017-01-01

## 2017-01-01 RX ORDER — LEVOTHYROXINE SODIUM 125 UG/1
125 TABLET ORAL
COMMUNITY
Start: 2017-01-01

## 2017-01-01 RX ORDER — AMLODIPINE BESYLATE 5 MG/1
2.5 TABLET ORAL DAILY
Status: DISCONTINUED | OUTPATIENT
Start: 2017-01-01 | End: 2017-01-01

## 2017-01-01 RX ORDER — LORAZEPAM 2 MG/ML
1 INJECTION INTRAMUSCULAR
Status: DISCONTINUED | OUTPATIENT
Start: 2017-01-01 | End: 2017-01-01 | Stop reason: HOSPADM

## 2017-01-01 RX ORDER — HYDROCODONE BITARTRATE AND HOMATROPINE METHYLBROMIDE 1.5; 5 MG/5ML; MG/5ML
5 SYRUP ORAL
Status: DISCONTINUED | OUTPATIENT
Start: 2017-01-01 | End: 2017-01-01 | Stop reason: HOSPADM

## 2017-01-01 RX ORDER — POLYETHYLENE GLYCOL 3350 17 G/17G
17 POWDER, FOR SOLUTION ORAL DAILY PRN
Status: DISCONTINUED | OUTPATIENT
Start: 2017-01-01 | End: 2017-01-01 | Stop reason: HOSPADM

## 2017-01-01 RX ORDER — FUROSEMIDE 20 MG/1
20 TABLET ORAL DAILY
Status: DISCONTINUED | OUTPATIENT
Start: 2017-01-01 | End: 2017-01-01

## 2017-01-01 RX ORDER — LIDOCAINE HYDROCHLORIDE 40 MG/ML
SOLUTION TOPICAL ONCE
Status: COMPLETED | OUTPATIENT
Start: 2017-01-01 | End: 2017-01-01

## 2017-01-01 RX ORDER — ALBUTEROL SULFATE 0.83 MG/ML
SOLUTION RESPIRATORY (INHALATION)
Status: COMPLETED
Start: 2017-01-01 | End: 2017-01-01

## 2017-01-01 RX ADMIN — BUDESONIDE 500 MCG: 0.5 INHALANT RESPIRATORY (INHALATION) at 20:13

## 2017-01-01 RX ADMIN — RIVAROXABAN 20 MG: 20 TABLET, FILM COATED ORAL at 16:24

## 2017-01-01 RX ADMIN — INSULIN LISPRO 4 UNITS: 100 INJECTION, SOLUTION INTRAVENOUS; SUBCUTANEOUS at 16:14

## 2017-01-01 RX ADMIN — LEVOTHYROXINE SODIUM 125 MCG: 75 TABLET ORAL at 08:25

## 2017-01-01 RX ADMIN — HALOPERIDOL LACTATE 2 MG: 5 INJECTION, SOLUTION INTRAMUSCULAR at 19:59

## 2017-01-01 RX ADMIN — IPRATROPIUM BROMIDE AND ALBUTEROL SULFATE 3 ML: .5; 3 SOLUTION RESPIRATORY (INHALATION) at 20:12

## 2017-01-01 RX ADMIN — ALBUTEROL SULFATE 2.5 MG: 2.5 SOLUTION RESPIRATORY (INHALATION) at 10:12

## 2017-01-01 RX ADMIN — SODIUM CHLORIDE, PRESERVATIVE FREE 300 UNITS: 5 INJECTION INTRAVENOUS at 20:00

## 2017-01-01 RX ADMIN — SODIUM CHLORIDE, PRESERVATIVE FREE 300 UNITS: 5 INJECTION INTRAVENOUS at 12:56

## 2017-01-01 RX ADMIN — ALBUTEROL SULFATE 2.5 MG: 2.5 SOLUTION RESPIRATORY (INHALATION) at 20:13

## 2017-01-01 RX ADMIN — Medication 20 ML: at 14:44

## 2017-01-01 RX ADMIN — MORPHINE SULFATE 4 MG: 4 INJECTION, SOLUTION INTRAMUSCULAR; INTRAVENOUS at 04:13

## 2017-01-01 RX ADMIN — AMLODIPINE BESYLATE 5 MG: 5 TABLET ORAL at 09:16

## 2017-01-01 RX ADMIN — MIRTAZAPINE 7.5 MG: 15 TABLET, FILM COATED ORAL at 21:26

## 2017-01-01 RX ADMIN — SODIUM CHLORIDE, PRESERVATIVE FREE 10 ML: 5 INJECTION INTRAVENOUS at 05:02

## 2017-01-01 RX ADMIN — Medication 20 ML: at 22:24

## 2017-01-01 RX ADMIN — LEVOTHYROXINE SODIUM 125 MCG: 75 TABLET ORAL at 08:51

## 2017-01-01 RX ADMIN — Medication 600 UNITS: at 15:00

## 2017-01-01 RX ADMIN — Medication 20 ML: at 14:25

## 2017-01-01 RX ADMIN — MORPHINE SULFATE 2 MG: 2 INJECTION, SOLUTION INTRAMUSCULAR; INTRAVENOUS at 20:48

## 2017-01-01 RX ADMIN — GLYBURIDE 1.25 MG: 2.5 TABLET ORAL at 07:15

## 2017-01-01 RX ADMIN — Medication 400 MG: at 21:27

## 2017-01-01 RX ADMIN — MORPHINE SULFATE 2 MG: 2 INJECTION, SOLUTION INTRAMUSCULAR; INTRAVENOUS at 22:44

## 2017-01-01 RX ADMIN — Medication 400 MG: at 17:30

## 2017-01-01 RX ADMIN — MIRTAZAPINE 7.5 MG: 15 TABLET, FILM COATED ORAL at 21:44

## 2017-01-01 RX ADMIN — SODIUM CHLORIDE, PRESERVATIVE FREE 10 ML: 5 INJECTION INTRAVENOUS at 17:36

## 2017-01-01 RX ADMIN — HYDROCODONE BITARTRATE AND ACETAMINOPHEN 1 TABLET: 5; 325 TABLET ORAL at 13:13

## 2017-01-01 RX ADMIN — Medication 600 UNITS: at 23:23

## 2017-01-01 RX ADMIN — POLYETHYLENE GLYCOL 3350 17 G: 17 POWDER, FOR SOLUTION ORAL at 09:52

## 2017-01-01 RX ADMIN — MORPHINE SULFATE 2 MG: 2 INJECTION, SOLUTION INTRAMUSCULAR; INTRAVENOUS at 20:27

## 2017-01-01 RX ADMIN — HYDROMORPHONE HYDROCHLORIDE 1 MG: 1 INJECTION, SOLUTION INTRAMUSCULAR; INTRAVENOUS; SUBCUTANEOUS at 16:14

## 2017-01-01 RX ADMIN — HYDROCODONE BITARTRATE AND ACETAMINOPHEN 1 TABLET: 5; 325 TABLET ORAL at 09:12

## 2017-01-01 RX ADMIN — Medication 10 ML: at 06:00

## 2017-01-01 RX ADMIN — OXYCODONE HYDROCHLORIDE 5 MG: 5 TABLET ORAL at 17:14

## 2017-01-01 RX ADMIN — HYDROCODONE BITARTRATE AND ACETAMINOPHEN 1 TABLET: 5; 325 TABLET ORAL at 00:38

## 2017-01-01 RX ADMIN — Medication 400 MG: at 07:59

## 2017-01-01 RX ADMIN — IPRATROPIUM BROMIDE AND ALBUTEROL SULFATE 3 ML: .5; 3 SOLUTION RESPIRATORY (INHALATION) at 04:21

## 2017-01-01 RX ADMIN — MORPHINE SULFATE 2 MG: 2 INJECTION, SOLUTION INTRAMUSCULAR; INTRAVENOUS at 08:39

## 2017-01-01 RX ADMIN — RIVAROXABAN 15 MG: 15 TABLET, FILM COATED ORAL at 16:59

## 2017-01-01 RX ADMIN — CALCIUM CARBONATE (ANTACID) CHEW TAB 500 MG 200 MG: 500 CHEW TAB at 11:57

## 2017-01-01 RX ADMIN — Medication 20 ML: at 05:38

## 2017-01-01 RX ADMIN — MIDAZOLAM HYDROCHLORIDE 1 MG: 1 INJECTION, SOLUTION INTRAMUSCULAR; INTRAVENOUS at 14:17

## 2017-01-01 RX ADMIN — SODIUM CHLORIDE, PRESERVATIVE FREE 10 ML: 5 INJECTION INTRAVENOUS at 07:57

## 2017-01-01 RX ADMIN — Medication 10 ML: at 14:11

## 2017-01-01 RX ADMIN — HYDROCODONE BITARTRATE AND ACETAMINOPHEN 1 TABLET: 5; 325 TABLET ORAL at 07:33

## 2017-01-01 RX ADMIN — LEVOTHYROXINE SODIUM 125 MCG: 75 TABLET ORAL at 08:01

## 2017-01-01 RX ADMIN — ALBUTEROL SULFATE 2.5 MG: 2.5 SOLUTION RESPIRATORY (INHALATION) at 07:28

## 2017-01-01 RX ADMIN — Medication 400 MG: at 22:00

## 2017-01-01 RX ADMIN — VALSARTAN 320 MG: 320 TABLET, FILM COATED ORAL at 08:26

## 2017-01-01 RX ADMIN — Medication 600 UNITS: at 14:55

## 2017-01-01 RX ADMIN — MORPHINE SULFATE 2 MG: 2 INJECTION, SOLUTION INTRAMUSCULAR; INTRAVENOUS at 12:08

## 2017-01-01 RX ADMIN — CALCITONIN SALMON 400 INT'L UNITS: 200 INJECTION, SOLUTION INTRAMUSCULAR; SUBCUTANEOUS at 21:03

## 2017-01-01 RX ADMIN — HYDROCODONE BITARTRATE AND HOMATROPINE METHYLBROMIDE 5 ML: 5; 1.5 SOLUTION ORAL at 17:47

## 2017-01-01 RX ADMIN — BUDESONIDE 500 MCG: 0.5 INHALANT RESPIRATORY (INHALATION) at 08:13

## 2017-01-01 RX ADMIN — OXYCODONE HYDROCHLORIDE 5 MG: 5 TABLET ORAL at 16:41

## 2017-01-01 RX ADMIN — Medication 20 ML: at 05:43

## 2017-01-01 RX ADMIN — HYDROCODONE BITARTRATE AND ACETAMINOPHEN 1 TABLET: 7.5; 325 TABLET ORAL at 16:24

## 2017-01-01 RX ADMIN — TUBERCULIN PURIFIED PROTEIN DERIVATIVE 5 UNITS: 5 INJECTION, SOLUTION INTRADERMAL at 12:44

## 2017-01-01 RX ADMIN — CALCITONIN SALMON 400 INT'L UNITS: 200 INJECTION, SOLUTION INTRAMUSCULAR; SUBCUTANEOUS at 22:35

## 2017-01-01 RX ADMIN — SODIUM CHLORIDE 200 ML/HR: 900 INJECTION, SOLUTION INTRAVENOUS at 06:14

## 2017-01-01 RX ADMIN — SODIUM CHLORIDE 75 ML/HR: 900 INJECTION, SOLUTION INTRAVENOUS at 05:16

## 2017-01-01 RX ADMIN — SODIUM CHLORIDE, PRESERVATIVE FREE 10 ML: 5 INJECTION INTRAVENOUS at 20:01

## 2017-01-01 RX ADMIN — HYDROCODONE BITARTRATE AND ACETAMINOPHEN 1 TABLET: 5; 325 TABLET ORAL at 16:07

## 2017-01-01 RX ADMIN — HYDROCODONE BITARTRATE AND HOMATROPINE METHYLBROMIDE 5 ML: 5; 1.5 SOLUTION ORAL at 18:17

## 2017-01-01 RX ADMIN — Medication 400 MG: at 16:04

## 2017-01-01 RX ADMIN — AMLODIPINE BESYLATE 5 MG: 5 TABLET ORAL at 07:15

## 2017-01-01 RX ADMIN — SODIUM CHLORIDE 100 ML/HR: 900 INJECTION, SOLUTION INTRAVENOUS at 18:54

## 2017-01-01 RX ADMIN — LEVOTHYROXINE SODIUM 125 MCG: 75 TABLET ORAL at 07:17

## 2017-01-01 RX ADMIN — MORPHINE SULFATE 2 MG: 2 INJECTION, SOLUTION INTRAMUSCULAR; INTRAVENOUS at 13:20

## 2017-01-01 RX ADMIN — Medication 400 MG: at 16:32

## 2017-01-01 RX ADMIN — Medication 300 UNITS: at 13:14

## 2017-01-01 RX ADMIN — AMLODIPINE BESYLATE 5 MG: 5 TABLET ORAL at 07:26

## 2017-01-01 RX ADMIN — OXYCODONE HYDROCHLORIDE 5 MG: 5 TABLET ORAL at 05:42

## 2017-01-01 RX ADMIN — Medication 300 UNITS: at 21:27

## 2017-01-01 RX ADMIN — GLYBURIDE 1.25 MG: 2.5 TABLET ORAL at 08:02

## 2017-01-01 RX ADMIN — AMLODIPINE BESYLATE 5 MG: 5 TABLET ORAL at 07:44

## 2017-01-01 RX ADMIN — MIRTAZAPINE 7.5 MG: 15 TABLET, FILM COATED ORAL at 21:22

## 2017-01-01 RX ADMIN — Medication 20 ML: at 21:27

## 2017-01-01 RX ADMIN — Medication 600 UNITS: at 23:03

## 2017-01-01 RX ADMIN — SODIUM CHLORIDE 200 ML/HR: 900 INJECTION, SOLUTION INTRAVENOUS at 06:10

## 2017-01-01 RX ADMIN — FUROSEMIDE 40 MG: 10 INJECTION, SOLUTION INTRAMUSCULAR; INTRAVENOUS at 07:28

## 2017-01-01 RX ADMIN — GLYBURIDE 1.25 MG: 2.5 TABLET ORAL at 16:39

## 2017-01-01 RX ADMIN — Medication 400 MG: at 17:15

## 2017-01-01 RX ADMIN — Medication 300 UNITS: at 22:17

## 2017-01-01 RX ADMIN — Medication 600 UNITS: at 05:11

## 2017-01-01 RX ADMIN — MORPHINE SULFATE 4 MG: 4 INJECTION, SOLUTION INTRAMUSCULAR; INTRAVENOUS at 11:06

## 2017-01-01 RX ADMIN — HYDROCODONE BITARTRATE AND ACETAMINOPHEN 1 TABLET: 5; 325 TABLET ORAL at 02:36

## 2017-01-01 RX ADMIN — MORPHINE SULFATE 4 MG: 4 INJECTION, SOLUTION INTRAMUSCULAR; INTRAVENOUS at 17:33

## 2017-01-01 RX ADMIN — SODIUM CHLORIDE 1000 ML: 900 INJECTION, SOLUTION INTRAVENOUS at 15:24

## 2017-01-01 RX ADMIN — PANTOPRAZOLE SODIUM 40 MG: 40 TABLET, DELAYED RELEASE ORAL at 07:43

## 2017-01-01 RX ADMIN — Medication 400 MG: at 07:15

## 2017-01-01 RX ADMIN — SODIUM CHLORIDE, PRESERVATIVE FREE 300 UNITS: 5 INJECTION INTRAVENOUS at 20:28

## 2017-01-01 RX ADMIN — SODIUM CHLORIDE, PRESERVATIVE FREE 10 ML: 5 INJECTION INTRAVENOUS at 02:27

## 2017-01-01 RX ADMIN — ALBUTEROL SULFATE 2.5 MG: 2.5 SOLUTION RESPIRATORY (INHALATION) at 12:01

## 2017-01-01 RX ADMIN — PANTOPRAZOLE SODIUM 40 MG: 40 TABLET, DELAYED RELEASE ORAL at 08:00

## 2017-01-01 RX ADMIN — MIRTAZAPINE 7.5 MG: 15 TABLET, FILM COATED ORAL at 22:00

## 2017-01-01 RX ADMIN — MORPHINE SULFATE 8 MG: 10 INJECTION INTRAVENOUS at 10:52

## 2017-01-01 RX ADMIN — SODIUM CHLORIDE 200 ML/HR: 900 INJECTION, SOLUTION INTRAVENOUS at 16:54

## 2017-01-01 RX ADMIN — INSULIN LISPRO 2 UNITS: 100 INJECTION, SOLUTION INTRAVENOUS; SUBCUTANEOUS at 16:30

## 2017-01-01 RX ADMIN — IPRATROPIUM BROMIDE 0.5 MG: 0.5 SOLUTION RESPIRATORY (INHALATION) at 13:30

## 2017-01-01 RX ADMIN — HYDROCODONE BITARTRATE AND ACETAMINOPHEN 1 TABLET: 5; 325 TABLET ORAL at 00:16

## 2017-01-01 RX ADMIN — LEVOTHYROXINE SODIUM 125 MCG: 75 TABLET ORAL at 07:59

## 2017-01-01 RX ADMIN — MORPHINE SULFATE 2 MG: 2 INJECTION, SOLUTION INTRAMUSCULAR; INTRAVENOUS at 11:15

## 2017-01-01 RX ADMIN — DIATRIZOATE MEGLUMINE AND DIATRIZOATE SODIUM 15 ML: 600; 100 SOLUTION ORAL; RECTAL at 11:21

## 2017-01-01 RX ADMIN — Medication 20 ML: at 23:22

## 2017-01-01 RX ADMIN — ALBUTEROL SULFATE 2.5 MG: 2.5 SOLUTION RESPIRATORY (INHALATION) at 14:49

## 2017-01-01 RX ADMIN — Medication 400 MG: at 08:25

## 2017-01-01 RX ADMIN — MORPHINE SULFATE 2 MG: 2 INJECTION, SOLUTION INTRAMUSCULAR; INTRAVENOUS at 00:47

## 2017-01-01 RX ADMIN — ALBUTEROL SULFATE 2.5 MG: 2.5 SOLUTION RESPIRATORY (INHALATION) at 18:26

## 2017-01-01 RX ADMIN — SODIUM CHLORIDE, PRESERVATIVE FREE 10 ML: 5 INJECTION INTRAVENOUS at 12:55

## 2017-01-01 RX ADMIN — INSULIN LISPRO 2 UNITS: 100 INJECTION, SOLUTION INTRAVENOUS; SUBCUTANEOUS at 23:19

## 2017-01-01 RX ADMIN — FUROSEMIDE 40 MG: 10 INJECTION, SOLUTION INTRAMUSCULAR; INTRAVENOUS at 08:52

## 2017-01-01 RX ADMIN — MORPHINE SULFATE 2 MG: 2 INJECTION, SOLUTION INTRAMUSCULAR; INTRAVENOUS at 05:00

## 2017-01-01 RX ADMIN — Medication 600 UNITS: at 14:25

## 2017-01-01 RX ADMIN — HYDROMORPHONE HYDROCHLORIDE 0.5 MG: 1 INJECTION, SOLUTION INTRAMUSCULAR; INTRAVENOUS; SUBCUTANEOUS at 12:19

## 2017-01-01 RX ADMIN — RIVAROXABAN 15 MG: 15 TABLET, FILM COATED ORAL at 16:26

## 2017-01-01 RX ADMIN — Medication 10 ML: at 05:08

## 2017-01-01 RX ADMIN — MORPHINE SULFATE 4 MG: 4 INJECTION, SOLUTION INTRAMUSCULAR; INTRAVENOUS at 22:17

## 2017-01-01 RX ADMIN — HYDROCODONE BITARTRATE AND HOMATROPINE METHYLBROMIDE 5 ML: 5; 1.5 SOLUTION ORAL at 23:03

## 2017-01-01 RX ADMIN — SODIUM CHLORIDE, PRESERVATIVE FREE 10 ML: 5 INJECTION INTRAVENOUS at 20:49

## 2017-01-01 RX ADMIN — SODIUM CHLORIDE, PRESERVATIVE FREE 10 ML: 5 INJECTION INTRAVENOUS at 20:27

## 2017-01-01 RX ADMIN — MIRTAZAPINE 7.5 MG: 15 TABLET, FILM COATED ORAL at 23:23

## 2017-01-01 RX ADMIN — ALBUTEROL SULFATE 2.5 MG: 2.5 SOLUTION RESPIRATORY (INHALATION) at 20:33

## 2017-01-01 RX ADMIN — MORPHINE SULFATE 4 MG: 2 INJECTION, SOLUTION INTRAMUSCULAR; INTRAVENOUS at 05:01

## 2017-01-01 RX ADMIN — PANTOPRAZOLE SODIUM 40 MG: 40 TABLET, DELAYED RELEASE ORAL at 09:52

## 2017-01-01 RX ADMIN — OXYCODONE HYDROCHLORIDE 5 MG: 5 TABLET ORAL at 11:49

## 2017-01-01 RX ADMIN — Medication 10 ML: at 14:25

## 2017-01-01 RX ADMIN — HYDROCODONE BITARTRATE AND ACETAMINOPHEN 1 TABLET: 5; 325 TABLET ORAL at 07:13

## 2017-01-01 RX ADMIN — Medication 600 UNITS: at 22:00

## 2017-01-01 RX ADMIN — SODIUM CHLORIDE, PRESERVATIVE FREE 10 ML: 5 INJECTION INTRAVENOUS at 07:36

## 2017-01-01 RX ADMIN — LORAZEPAM 1 MG: 2 INJECTION INTRAMUSCULAR; INTRAVENOUS at 18:06

## 2017-01-01 RX ADMIN — PANTOPRAZOLE SODIUM 40 MG: 40 TABLET, DELAYED RELEASE ORAL at 05:38

## 2017-01-01 RX ADMIN — MIRTAZAPINE 7.5 MG: 15 TABLET, FILM COATED ORAL at 23:21

## 2017-01-01 RX ADMIN — HYDROCODONE BITARTRATE AND ACETAMINOPHEN 1 TABLET: 5; 325 TABLET ORAL at 12:31

## 2017-01-01 RX ADMIN — HYDROCODONE BITARTRATE AND ACETAMINOPHEN 1 TABLET: 5; 325 TABLET ORAL at 07:44

## 2017-01-01 RX ADMIN — INSULIN LISPRO 2 UNITS: 100 INJECTION, SOLUTION INTRAVENOUS; SUBCUTANEOUS at 17:16

## 2017-01-01 RX ADMIN — FUROSEMIDE 20 MG: 20 TABLET ORAL at 16:26

## 2017-01-01 RX ADMIN — SODIUM CHLORIDE, PRESERVATIVE FREE 10 ML: 5 INJECTION INTRAVENOUS at 04:33

## 2017-01-01 RX ADMIN — SODIUM CHLORIDE 200 ML/HR: 900 INJECTION, SOLUTION INTRAVENOUS at 20:12

## 2017-01-01 RX ADMIN — CALCITONIN SALMON 400 INT'L UNITS: 200 INJECTION, SOLUTION INTRAMUSCULAR; SUBCUTANEOUS at 10:44

## 2017-01-01 RX ADMIN — Medication 20 ML: at 13:29

## 2017-01-01 RX ADMIN — Medication 10 ML: at 21:44

## 2017-01-01 RX ADMIN — GLYBURIDE 1.25 MG: 2.5 TABLET ORAL at 17:33

## 2017-01-01 RX ADMIN — VALSARTAN 320 MG: 320 TABLET, FILM COATED ORAL at 07:58

## 2017-01-01 RX ADMIN — Medication 20 ML: at 06:10

## 2017-01-01 RX ADMIN — Medication 10 ML: at 05:43

## 2017-01-01 RX ADMIN — MORPHINE SULFATE 4 MG: 2 INJECTION, SOLUTION INTRAMUSCULAR; INTRAVENOUS at 04:21

## 2017-01-01 RX ADMIN — Medication 300 UNITS: at 06:12

## 2017-01-01 RX ADMIN — PANTOPRAZOLE SODIUM 40 MG: 40 TABLET, DELAYED RELEASE ORAL at 06:13

## 2017-01-01 RX ADMIN — ALBUTEROL SULFATE 2.5 MG: 2.5 SOLUTION RESPIRATORY (INHALATION) at 16:11

## 2017-01-01 RX ADMIN — RIVAROXABAN 15 MG: 15 TABLET, FILM COATED ORAL at 16:39

## 2017-01-01 RX ADMIN — Medication 400 MG: at 15:21

## 2017-01-01 RX ADMIN — SODIUM CHLORIDE, PRESERVATIVE FREE 10 ML: 5 INJECTION INTRAVENOUS at 09:39

## 2017-01-01 RX ADMIN — GLYBURIDE 1.25 MG: 2.5 TABLET ORAL at 08:25

## 2017-01-01 RX ADMIN — POLYETHYLENE GLYCOL 3350 17 G: 17 POWDER, FOR SOLUTION ORAL at 08:53

## 2017-01-01 RX ADMIN — Medication 600 UNITS: at 05:37

## 2017-01-01 RX ADMIN — BUDESONIDE 500 MCG: 0.5 INHALANT RESPIRATORY (INHALATION) at 20:33

## 2017-01-01 RX ADMIN — DOCUSATE SODIUM 100 MG: 100 CAPSULE, LIQUID FILLED ORAL at 08:51

## 2017-01-01 RX ADMIN — HALOPERIDOL LACTATE 2 MG: 5 INJECTION, SOLUTION INTRAMUSCULAR at 22:44

## 2017-01-01 RX ADMIN — Medication 10 ML: at 23:03

## 2017-01-01 RX ADMIN — SODIUM CHLORIDE 200 ML/HR: 900 INJECTION, SOLUTION INTRAVENOUS at 00:14

## 2017-01-01 RX ADMIN — HYDROCODONE BITARTRATE AND ACETAMINOPHEN 1 TABLET: 5; 325 TABLET ORAL at 00:14

## 2017-01-01 RX ADMIN — HYDROCODONE BITARTRATE AND ACETAMINOPHEN 1 TABLET: 5; 325 TABLET ORAL at 12:34

## 2017-01-01 RX ADMIN — AMLODIPINE BESYLATE 5 MG: 5 TABLET ORAL at 08:05

## 2017-01-01 RX ADMIN — Medication 600 UNITS: at 13:29

## 2017-01-01 RX ADMIN — SODIUM CHLORIDE, PRESERVATIVE FREE 10 ML: 5 INJECTION INTRAVENOUS at 06:55

## 2017-01-01 RX ADMIN — Medication 600 UNITS: at 06:00

## 2017-01-01 RX ADMIN — SODIUM CHLORIDE, PRESERVATIVE FREE 10 ML: 5 INJECTION INTRAVENOUS at 00:02

## 2017-01-01 RX ADMIN — Medication 20 ML: at 05:07

## 2017-01-01 RX ADMIN — MORPHINE SULFATE 2 MG: 2 INJECTION, SOLUTION INTRAMUSCULAR; INTRAVENOUS at 06:51

## 2017-01-01 RX ADMIN — FENTANYL CITRATE 50 MCG: 50 INJECTION, SOLUTION INTRAMUSCULAR; INTRAVENOUS at 14:21

## 2017-01-01 RX ADMIN — RIVAROXABAN 15 MG: 15 TABLET, FILM COATED ORAL at 17:58

## 2017-01-01 RX ADMIN — SODIUM CHLORIDE, PRESERVATIVE FREE 10 ML: 5 INJECTION INTRAVENOUS at 20:00

## 2017-01-01 RX ADMIN — Medication 300 UNITS: at 14:44

## 2017-01-01 RX ADMIN — AMLODIPINE BESYLATE 5 MG: 5 TABLET ORAL at 07:59

## 2017-01-01 RX ADMIN — RIVAROXABAN 15 MG: 15 TABLET, FILM COATED ORAL at 16:20

## 2017-01-01 RX ADMIN — MORPHINE SULFATE 2 MG: 2 INJECTION, SOLUTION INTRAMUSCULAR; INTRAVENOUS at 16:19

## 2017-01-01 RX ADMIN — FUROSEMIDE 20 MG: 10 INJECTION, SOLUTION INTRAMUSCULAR; INTRAVENOUS at 14:44

## 2017-01-01 RX ADMIN — HYDROCODONE BITARTRATE AND ACETAMINOPHEN 1 TABLET: 5; 325 TABLET ORAL at 16:04

## 2017-01-01 RX ADMIN — PANTOPRAZOLE SODIUM 40 MG: 40 TABLET, DELAYED RELEASE ORAL at 07:17

## 2017-01-01 RX ADMIN — STANDARDIZED SENNA CONCENTRATE AND DOCUSATE SODIUM 2 TABLET: 8.6; 5 TABLET, FILM COATED ORAL at 08:51

## 2017-01-01 RX ADMIN — MAGNESIUM SULFATE HEPTAHYDRATE 2 G: 40 INJECTION, SOLUTION INTRAVENOUS at 08:28

## 2017-01-01 RX ADMIN — Medication 600 UNITS: at 05:07

## 2017-01-01 RX ADMIN — HYDROMORPHONE HYDROCHLORIDE 1 MG: 1 INJECTION, SOLUTION INTRAMUSCULAR; INTRAVENOUS; SUBCUTANEOUS at 10:00

## 2017-01-01 RX ADMIN — Medication 600 UNITS: at 05:44

## 2017-01-01 RX ADMIN — MORPHINE SULFATE 2 MG: 2 INJECTION, SOLUTION INTRAMUSCULAR; INTRAVENOUS at 00:17

## 2017-01-01 RX ADMIN — Medication 400 MG: at 07:26

## 2017-01-01 RX ADMIN — VALSARTAN 320 MG: 320 TABLET, FILM COATED ORAL at 07:26

## 2017-01-01 RX ADMIN — Medication 400 MG: at 16:07

## 2017-01-01 RX ADMIN — GLYBURIDE 1.25 MG: 2.5 TABLET ORAL at 16:26

## 2017-01-01 RX ADMIN — AMLODIPINE BESYLATE 5 MG: 5 TABLET ORAL at 07:13

## 2017-01-01 RX ADMIN — RIVAROXABAN 15 MG: 15 TABLET, FILM COATED ORAL at 17:14

## 2017-01-01 RX ADMIN — GLYCOPYRROLATE 0.2 MG: 0.2 INJECTION INTRAMUSCULAR; INTRAVENOUS at 17:34

## 2017-01-01 RX ADMIN — HYDROMORPHONE HYDROCHLORIDE 1 MG: 1 INJECTION, SOLUTION INTRAMUSCULAR; INTRAVENOUS; SUBCUTANEOUS at 21:15

## 2017-01-01 RX ADMIN — Medication 600 UNITS: at 21:43

## 2017-01-01 RX ADMIN — SODIUM CHLORIDE, PRESERVATIVE FREE 10 ML: 5 INJECTION INTRAVENOUS at 20:54

## 2017-01-01 RX ADMIN — MORPHINE SULFATE 4 MG: 4 INJECTION, SOLUTION INTRAMUSCULAR; INTRAVENOUS at 00:01

## 2017-01-01 RX ADMIN — SODIUM CHLORIDE, PRESERVATIVE FREE 10 ML: 5 INJECTION INTRAVENOUS at 09:04

## 2017-01-01 RX ADMIN — SODIUM CHLORIDE, PRESERVATIVE FREE 300 UNITS: 5 INJECTION INTRAVENOUS at 07:37

## 2017-01-01 RX ADMIN — VALSARTAN 320 MG: 320 TABLET, FILM COATED ORAL at 07:13

## 2017-01-01 RX ADMIN — DIPHENHYDRAMINE HYDROCHLORIDE 25 MG: 50 INJECTION, SOLUTION INTRAMUSCULAR; INTRAVENOUS at 12:51

## 2017-01-01 RX ADMIN — HYDROMORPHONE HYDROCHLORIDE 1 MG: 1 INJECTION, SOLUTION INTRAMUSCULAR; INTRAVENOUS; SUBCUTANEOUS at 05:05

## 2017-01-01 RX ADMIN — Medication 20 ML: at 05:44

## 2017-01-01 RX ADMIN — HALOPERIDOL LACTATE 2 MG: 5 INJECTION, SOLUTION INTRAMUSCULAR at 01:17

## 2017-01-01 RX ADMIN — SODIUM CHLORIDE, PRESERVATIVE FREE 10 ML: 5 INJECTION INTRAVENOUS at 14:29

## 2017-01-01 RX ADMIN — HYDROMORPHONE HYDROCHLORIDE 1 MG: 1 INJECTION, SOLUTION INTRAMUSCULAR; INTRAVENOUS; SUBCUTANEOUS at 11:57

## 2017-01-01 RX ADMIN — HYDROCODONE BITARTRATE AND ACETAMINOPHEN 1 TABLET: 5; 325 TABLET ORAL at 01:36

## 2017-01-01 RX ADMIN — CALCIUM CARBONATE (ANTACID) CHEW TAB 500 MG 200 MG: 500 CHEW TAB at 08:16

## 2017-01-01 RX ADMIN — IOPAMIDOL 100 ML: 755 INJECTION, SOLUTION INTRAVENOUS at 14:10

## 2017-01-01 RX ADMIN — CEFTRIAXONE SODIUM 1 G: 1 INJECTION, POWDER, FOR SOLUTION INTRAMUSCULAR; INTRAVENOUS at 17:51

## 2017-01-01 RX ADMIN — IPRATROPIUM BROMIDE AND ALBUTEROL SULFATE 3 ML: .5; 3 SOLUTION RESPIRATORY (INHALATION) at 20:43

## 2017-01-01 RX ADMIN — MORPHINE SULFATE 4 MG: 4 INJECTION, SOLUTION INTRAMUSCULAR; INTRAVENOUS at 14:38

## 2017-01-01 RX ADMIN — Medication 600 UNITS: at 21:23

## 2017-01-01 RX ADMIN — HYDROCODONE BITARTRATE AND ACETAMINOPHEN 1 TABLET: 5; 325 TABLET ORAL at 21:27

## 2017-01-01 RX ADMIN — MORPHINE SULFATE 4 MG: 4 INJECTION, SOLUTION INTRAMUSCULAR; INTRAVENOUS at 07:56

## 2017-01-01 RX ADMIN — MORPHINE SULFATE 4 MG: 4 INJECTION, SOLUTION INTRAMUSCULAR; INTRAVENOUS at 13:31

## 2017-01-01 RX ADMIN — MORPHINE SULFATE 4 MG: 2 INJECTION, SOLUTION INTRAMUSCULAR; INTRAVENOUS at 01:33

## 2017-01-01 RX ADMIN — Medication 600 UNITS: at 05:43

## 2017-01-01 RX ADMIN — LEVOTHYROXINE SODIUM 125 MCG: 75 TABLET ORAL at 07:13

## 2017-01-01 RX ADMIN — Medication 600 UNITS: at 14:33

## 2017-01-01 RX ADMIN — MORPHINE SULFATE 4 MG: 4 INJECTION, SOLUTION INTRAMUSCULAR; INTRAVENOUS at 09:38

## 2017-01-01 RX ADMIN — HYDROCODONE BITARTRATE AND ACETAMINOPHEN 1 TABLET: 5; 325 TABLET ORAL at 03:08

## 2017-01-01 RX ADMIN — Medication 400 MG: at 21:22

## 2017-01-01 RX ADMIN — SODIUM CHLORIDE 200 ML/HR: 900 INJECTION, SOLUTION INTRAVENOUS at 08:21

## 2017-01-01 RX ADMIN — HYDROCODONE BITARTRATE AND ACETAMINOPHEN 1 TABLET: 5; 325 TABLET ORAL at 11:53

## 2017-01-01 RX ADMIN — OXYCODONE HYDROCHLORIDE 5 MG: 5 TABLET ORAL at 07:17

## 2017-01-01 RX ADMIN — MORPHINE SULFATE 2 MG: 2 INJECTION, SOLUTION INTRAMUSCULAR; INTRAVENOUS at 20:17

## 2017-01-01 RX ADMIN — Medication 600 UNITS: at 21:45

## 2017-01-01 RX ADMIN — Medication 400 MG: at 15:24

## 2017-01-01 RX ADMIN — LIDOCAINE HYDROCHLORIDE 1 ML: 20 JELLY TOPICAL at 14:22

## 2017-01-01 RX ADMIN — Medication 400 MG: at 09:16

## 2017-01-01 RX ADMIN — Medication 400 MG: at 22:15

## 2017-01-01 RX ADMIN — MIRTAZAPINE 7.5 MG: 15 TABLET, FILM COATED ORAL at 22:15

## 2017-01-01 RX ADMIN — Medication 400 MG: at 16:39

## 2017-01-01 RX ADMIN — Medication 20 ML: at 23:03

## 2017-01-01 RX ADMIN — Medication 400 MG: at 07:44

## 2017-01-01 RX ADMIN — Medication 400 MG: at 23:21

## 2017-01-01 RX ADMIN — HYDROMORPHONE HYDROCHLORIDE 1 MG: 1 INJECTION, SOLUTION INTRAMUSCULAR; INTRAVENOUS; SUBCUTANEOUS at 15:24

## 2017-01-01 RX ADMIN — SODIUM CHLORIDE 100 ML/HR: 900 INJECTION, SOLUTION INTRAVENOUS at 03:58

## 2017-01-01 RX ADMIN — Medication 20 ML: at 21:45

## 2017-01-01 RX ADMIN — Medication 600 UNITS: at 06:06

## 2017-01-01 RX ADMIN — FUROSEMIDE 40 MG: 10 INJECTION, SOLUTION INTRAMUSCULAR; INTRAVENOUS at 09:17

## 2017-01-01 RX ADMIN — Medication 400 MG: at 23:23

## 2017-01-01 RX ADMIN — Medication 400 MG: at 16:26

## 2017-01-01 RX ADMIN — PANTOPRAZOLE SODIUM 40 MG: 40 TABLET, DELAYED RELEASE ORAL at 08:01

## 2017-01-01 RX ADMIN — POLYETHYLENE GLYCOL 3350 17 G: 17 POWDER, FOR SOLUTION ORAL at 08:00

## 2017-01-01 RX ADMIN — VALSARTAN 320 MG: 320 TABLET, FILM COATED ORAL at 09:16

## 2017-01-01 RX ADMIN — SODIUM CHLORIDE, PRESERVATIVE FREE 10 ML: 5 INJECTION INTRAVENOUS at 10:53

## 2017-01-01 RX ADMIN — SODIUM CHLORIDE, PRESERVATIVE FREE 10 ML: 5 INJECTION INTRAVENOUS at 06:52

## 2017-01-01 RX ADMIN — MORPHINE SULFATE 4 MG: 4 INJECTION, SOLUTION INTRAMUSCULAR; INTRAVENOUS at 07:36

## 2017-01-01 RX ADMIN — SODIUM CHLORIDE, PRESERVATIVE FREE 10 ML: 5 INJECTION INTRAVENOUS at 04:42

## 2017-01-01 RX ADMIN — Medication 20 ML: at 22:00

## 2017-01-01 RX ADMIN — DOCUSATE SODIUM 100 MG: 100 CAPSULE, LIQUID FILLED ORAL at 08:05

## 2017-01-01 RX ADMIN — SODIUM CHLORIDE 25 ML/HR: 900 INJECTION, SOLUTION INTRAVENOUS at 16:07

## 2017-01-01 RX ADMIN — SODIUM CHLORIDE, PRESERVATIVE FREE 10 ML: 5 INJECTION INTRAVENOUS at 09:34

## 2017-01-01 RX ADMIN — MIRTAZAPINE 7.5 MG: 15 TABLET, FILM COATED ORAL at 22:24

## 2017-01-01 RX ADMIN — GLYBURIDE 1.25 MG: 2.5 TABLET ORAL at 16:59

## 2017-01-01 RX ADMIN — HYDROMORPHONE HYDROCHLORIDE 1 MG: 1 INJECTION, SOLUTION INTRAMUSCULAR; INTRAVENOUS; SUBCUTANEOUS at 19:02

## 2017-01-01 RX ADMIN — Medication 300 UNITS: at 14:07

## 2017-01-01 RX ADMIN — MIRTAZAPINE 7.5 MG: 15 TABLET, FILM COATED ORAL at 23:03

## 2017-01-01 RX ADMIN — IPRATROPIUM BROMIDE AND ALBUTEROL SULFATE 3 ML: .5; 3 SOLUTION RESPIRATORY (INHALATION) at 11:05

## 2017-01-01 RX ADMIN — SODIUM CHLORIDE, PRESERVATIVE FREE 10 ML: 5 INJECTION INTRAVENOUS at 11:06

## 2017-01-01 RX ADMIN — SODIUM CHLORIDE 100 ML: 900 INJECTION, SOLUTION INTRAVENOUS at 14:10

## 2017-01-01 RX ADMIN — SODIUM CHLORIDE 200 ML/HR: 900 INJECTION, SOLUTION INTRAVENOUS at 15:12

## 2017-01-01 RX ADMIN — Medication 20 ML: at 21:23

## 2017-01-01 RX ADMIN — Medication 10 ML: at 14:07

## 2017-01-01 RX ADMIN — AMLODIPINE BESYLATE 5 MG: 5 TABLET ORAL at 08:26

## 2017-01-01 RX ADMIN — LORAZEPAM 1 MG: 2 INJECTION INTRAMUSCULAR; INTRAVENOUS at 04:12

## 2017-01-01 RX ADMIN — DIPHENHYDRAMINE HYDROCHLORIDE 25 MG: 50 INJECTION, SOLUTION INTRAMUSCULAR; INTRAVENOUS at 00:01

## 2017-01-01 RX ADMIN — HYDROCODONE BITARTRATE AND ACETAMINOPHEN 1 TABLET: 5; 325 TABLET ORAL at 04:55

## 2017-01-01 RX ADMIN — HYDROCODONE BITARTRATE AND ACETAMINOPHEN 1 TABLET: 5; 325 TABLET ORAL at 18:05

## 2017-01-01 RX ADMIN — SODIUM CHLORIDE, PRESERVATIVE FREE 10 ML: 5 INJECTION INTRAVENOUS at 21:33

## 2017-01-01 RX ADMIN — SODIUM CHLORIDE, PRESERVATIVE FREE 300 UNITS: 5 INJECTION INTRAVENOUS at 07:57

## 2017-01-01 RX ADMIN — Medication 10 ML: at 23:23

## 2017-01-01 RX ADMIN — Medication 20 ML: at 14:10

## 2017-01-01 RX ADMIN — GLYBURIDE 1.25 MG: 2.5 TABLET ORAL at 08:16

## 2017-01-01 RX ADMIN — HYDROCODONE BITARTRATE AND HOMATROPINE METHYLBROMIDE 5 ML: 5; 1.5 SOLUTION ORAL at 11:57

## 2017-01-01 RX ADMIN — LEVOTHYROXINE SODIUM 125 MCG: 75 TABLET ORAL at 07:43

## 2017-01-01 RX ADMIN — Medication 300 UNITS: at 05:45

## 2017-01-01 RX ADMIN — MORPHINE SULFATE 2 MG: 2 INJECTION, SOLUTION INTRAMUSCULAR; INTRAVENOUS at 11:02

## 2017-01-01 RX ADMIN — RIVAROXABAN 15 MG: 15 TABLET, FILM COATED ORAL at 16:22

## 2017-01-01 RX ADMIN — HYDROCODONE BITARTRATE AND ACETAMINOPHEN 1 TABLET: 5; 325 TABLET ORAL at 22:34

## 2017-01-01 RX ADMIN — LORAZEPAM 1 MG: 2 INJECTION INTRAMUSCULAR; INTRAVENOUS at 10:52

## 2017-01-01 RX ADMIN — MORPHINE SULFATE 2 MG: 2 INJECTION, SOLUTION INTRAMUSCULAR; INTRAVENOUS at 14:29

## 2017-01-01 RX ADMIN — HYDROCODONE BITARTRATE AND ACETAMINOPHEN 1 TABLET: 5; 325 TABLET ORAL at 20:51

## 2017-01-01 RX ADMIN — HYDROMORPHONE HYDROCHLORIDE 1 MG: 1 INJECTION, SOLUTION INTRAMUSCULAR; INTRAVENOUS; SUBCUTANEOUS at 02:40

## 2017-01-01 RX ADMIN — Medication 20 ML: at 14:34

## 2017-01-01 RX ADMIN — Medication 20 ML: at 05:11

## 2017-01-01 RX ADMIN — HYDROCODONE BITARTRATE AND HOMATROPINE METHYLBROMIDE 5 ML: 5; 1.5 SOLUTION ORAL at 08:52

## 2017-01-01 RX ADMIN — BUDESONIDE 500 MCG: 0.5 INHALANT RESPIRATORY (INHALATION) at 07:28

## 2017-01-01 RX ADMIN — LORAZEPAM 1 MG: 2 INJECTION INTRAMUSCULAR; INTRAVENOUS at 19:55

## 2017-01-01 RX ADMIN — LORAZEPAM 1 MG: 2 INJECTION INTRAMUSCULAR; INTRAVENOUS at 04:17

## 2017-01-01 RX ADMIN — MORPHINE SULFATE 4 MG: 2 INJECTION, SOLUTION INTRAMUSCULAR; INTRAVENOUS at 04:41

## 2017-01-01 RX ADMIN — GLYCOPYRROLATE 0.2 MG: 0.2 INJECTION INTRAMUSCULAR; INTRAVENOUS at 06:54

## 2017-01-01 RX ADMIN — CALCITONIN SALMON 400 INT'L UNITS: 200 INJECTION, SOLUTION INTRAMUSCULAR; SUBCUTANEOUS at 10:26

## 2017-01-01 RX ADMIN — Medication 20 ML: at 22:17

## 2017-01-01 RX ADMIN — VALSARTAN 320 MG: 320 TABLET, FILM COATED ORAL at 08:05

## 2017-01-01 RX ADMIN — HALOPERIDOL LACTATE 2 MG: 5 INJECTION, SOLUTION INTRAMUSCULAR at 09:38

## 2017-01-01 RX ADMIN — HYDROMORPHONE HYDROCHLORIDE 1 MG: 1 INJECTION, SOLUTION INTRAMUSCULAR; INTRAVENOUS; SUBCUTANEOUS at 07:51

## 2017-01-01 RX ADMIN — GLYBURIDE 1.25 MG: 2.5 TABLET ORAL at 08:51

## 2017-01-01 RX ADMIN — PANTOPRAZOLE SODIUM 40 MG: 40 TABLET, DELAYED RELEASE ORAL at 07:26

## 2017-01-01 RX ADMIN — Medication 20 ML: at 15:25

## 2017-01-01 RX ADMIN — DOCUSATE SODIUM 100 MG: 100 CAPSULE, LIQUID FILLED ORAL at 07:16

## 2017-01-01 RX ADMIN — VALSARTAN 320 MG: 320 TABLET, FILM COATED ORAL at 07:44

## 2017-01-01 RX ADMIN — MIDAZOLAM HYDROCHLORIDE 1 MG: 1 INJECTION, SOLUTION INTRAMUSCULAR; INTRAVENOUS at 14:22

## 2017-01-01 RX ADMIN — MORPHINE SULFATE 4 MG: 2 INJECTION, SOLUTION INTRAMUSCULAR; INTRAVENOUS at 02:25

## 2017-01-01 RX ADMIN — LORAZEPAM 1 MG: 2 INJECTION INTRAMUSCULAR; INTRAVENOUS at 00:47

## 2017-01-01 RX ADMIN — Medication 20 ML: at 13:14

## 2017-01-01 RX ADMIN — PAMIDRONATE DISODIUM 90 MG: 9 INJECTION, SOLUTION INTRAVENOUS at 14:07

## 2017-01-01 RX ADMIN — AMLODIPINE BESYLATE 5 MG: 5 TABLET ORAL at 08:51

## 2017-01-01 RX ADMIN — MAGNESIUM SULFATE HEPTAHYDRATE 2 G: 40 INJECTION, SOLUTION INTRAVENOUS at 09:17

## 2017-01-01 RX ADMIN — MORPHINE SULFATE 4 MG: 4 INJECTION, SOLUTION INTRAMUSCULAR; INTRAVENOUS at 21:33

## 2017-01-01 RX ADMIN — HALOPERIDOL LACTATE 2 MG: 5 INJECTION, SOLUTION INTRAMUSCULAR at 22:18

## 2017-01-01 RX ADMIN — SODIUM CHLORIDE, PRESERVATIVE FREE 300 UNITS: 5 INJECTION INTRAVENOUS at 09:39

## 2017-01-01 RX ADMIN — HYDROCODONE BITARTRATE AND ACETAMINOPHEN 1 TABLET: 5; 325 TABLET ORAL at 20:37

## 2017-01-01 RX ADMIN — GLYCOPYRROLATE 0.2 MG: 0.2 INJECTION INTRAMUSCULAR; INTRAVENOUS at 11:05

## 2017-01-01 RX ADMIN — Medication 600 UNITS: at 22:24

## 2017-01-01 RX ADMIN — GLYBURIDE 1.25 MG: 2.5 TABLET ORAL at 17:58

## 2017-01-01 RX ADMIN — FUROSEMIDE 40 MG: 10 INJECTION, SOLUTION INTRAMUSCULAR; INTRAVENOUS at 07:12

## 2017-01-01 RX ADMIN — LORAZEPAM 1 MG: 2 INJECTION INTRAMUSCULAR; INTRAVENOUS at 12:08

## 2017-01-01 RX ADMIN — LEVOTHYROXINE SODIUM 125 MCG: 75 TABLET ORAL at 06:13

## 2017-01-01 RX ADMIN — Medication 600 UNITS: at 05:56

## 2017-01-01 RX ADMIN — HYDROCODONE BITARTRATE AND HOMATROPINE METHYLBROMIDE 5 ML: 5; 1.5 SOLUTION ORAL at 11:56

## 2017-01-01 RX ADMIN — CALCIUM CARBONATE (ANTACID) CHEW TAB 500 MG 200 MG: 500 CHEW TAB at 16:22

## 2017-01-01 RX ADMIN — HYDROCODONE BITARTRATE AND HOMATROPINE METHYLBROMIDE 5 ML: 5; 1.5 SOLUTION ORAL at 21:15

## 2017-01-01 RX ADMIN — Medication 20 ML: at 05:56

## 2017-01-01 RX ADMIN — GLYBURIDE 1.25 MG: 2.5 TABLET ORAL at 07:27

## 2017-01-01 RX ADMIN — Medication 20 ML: at 06:06

## 2017-01-01 RX ADMIN — SODIUM CHLORIDE, PRESERVATIVE FREE 10 ML: 5 INJECTION INTRAVENOUS at 11:02

## 2017-01-01 RX ADMIN — HYDROCODONE BITARTRATE AND ACETAMINOPHEN 1 TABLET: 5; 325 TABLET ORAL at 12:45

## 2017-01-01 RX ADMIN — SODIUM CHLORIDE, PRESERVATIVE FREE 10 ML: 5 INJECTION INTRAVENOUS at 10:21

## 2017-01-01 RX ADMIN — SODIUM CHLORIDE 200 ML/HR: 900 INJECTION, SOLUTION INTRAVENOUS at 00:05

## 2017-01-01 RX ADMIN — VALSARTAN 320 MG: 320 TABLET, FILM COATED ORAL at 08:52

## 2017-01-01 RX ADMIN — OXYCODONE HYDROCHLORIDE 5 MG: 5 TABLET ORAL at 17:46

## 2017-01-01 RX ADMIN — Medication 20 ML: at 21:44

## 2017-01-01 RX ADMIN — VALSARTAN 320 MG: 320 TABLET, FILM COATED ORAL at 07:16

## 2017-01-01 RX ADMIN — POLYETHYLENE GLYCOL 3350 17 G: 17 POWDER, FOR SOLUTION ORAL at 08:11

## 2017-01-01 RX ADMIN — AMLODIPINE BESYLATE 2.5 MG: 5 TABLET ORAL at 12:34

## 2017-01-01 RX ADMIN — ALBUTEROL SULFATE 2.5 MG: 2.5 SOLUTION RESPIRATORY (INHALATION) at 08:13

## 2017-01-01 RX ADMIN — IPRATROPIUM BROMIDE 0.5 MG: 0.5 SOLUTION RESPIRATORY (INHALATION) at 17:33

## 2017-01-01 RX ADMIN — SODIUM CHLORIDE, PRESERVATIVE FREE 300 UNITS: 5 INJECTION INTRAVENOUS at 09:03

## 2017-01-01 RX ADMIN — SODIUM CHLORIDE, PRESERVATIVE FREE 300 UNITS: 5 INJECTION INTRAVENOUS at 20:54

## 2017-01-01 RX ADMIN — STANDARDIZED SENNA CONCENTRATE AND DOCUSATE SODIUM 2 TABLET: 8.6; 5 TABLET, FILM COATED ORAL at 16:41

## 2017-01-01 RX ADMIN — Medication 400 MG: at 08:05

## 2017-01-01 RX ADMIN — LEVOTHYROXINE SODIUM 125 MCG: 75 TABLET ORAL at 07:26

## 2017-01-01 RX ADMIN — PANTOPRAZOLE SODIUM 40 MG: 40 TABLET, DELAYED RELEASE ORAL at 08:27

## 2017-01-01 RX ADMIN — HALOPERIDOL LACTATE 2 MG: 5 INJECTION, SOLUTION INTRAMUSCULAR at 07:56

## 2017-01-01 RX ADMIN — HYDROCODONE BITARTRATE AND ACETAMINOPHEN 1 TABLET: 5; 325 TABLET ORAL at 09:40

## 2017-01-01 RX ADMIN — IPRATROPIUM BROMIDE AND ALBUTEROL SULFATE 3 ML: .5; 3 SOLUTION RESPIRATORY (INHALATION) at 00:50

## 2017-01-01 RX ADMIN — GLYBURIDE 1.25 MG: 2.5 TABLET ORAL at 16:24

## 2017-01-01 RX ADMIN — HYDROCODONE BITARTRATE AND ACETAMINOPHEN 1 TABLET: 5; 325 TABLET ORAL at 18:06

## 2017-01-01 RX ADMIN — MORPHINE SULFATE 4 MG: 4 INJECTION, SOLUTION INTRAMUSCULAR; INTRAVENOUS at 19:59

## 2017-01-01 RX ADMIN — GLYBURIDE 1.25 MG: 2.5 TABLET ORAL at 07:33

## 2017-01-01 RX ADMIN — GLYBURIDE 1.25 MG: 2.5 TABLET ORAL at 16:20

## 2017-01-01 RX ADMIN — MORPHINE SULFATE 2 MG: 2 INJECTION, SOLUTION INTRAMUSCULAR; INTRAVENOUS at 07:15

## 2017-01-01 RX ADMIN — GLYBURIDE 1.25 MG: 2.5 TABLET ORAL at 07:45

## 2017-01-01 RX ADMIN — SODIUM CHLORIDE 200 ML/HR: 900 INJECTION, SOLUTION INTRAVENOUS at 22:38

## 2017-01-01 RX ADMIN — HALOPERIDOL LACTATE 2 MG: 5 INJECTION, SOLUTION INTRAMUSCULAR at 12:19

## 2017-01-01 RX ADMIN — OXYCODONE HYDROCHLORIDE 5 MG: 5 TABLET ORAL at 23:21

## 2017-01-01 RX ADMIN — CALCIUM CARBONATE (ANTACID) CHEW TAB 500 MG 200 MG: 500 CHEW TAB at 08:51

## 2017-01-01 RX ADMIN — Medication 20 ML: at 23:23

## 2017-01-01 RX ADMIN — LEVOTHYROXINE SODIUM 125 MCG: 75 TABLET ORAL at 05:39

## 2017-01-01 RX ADMIN — KETOROLAC TROMETHAMINE 15 MG: 15 INJECTION, SOLUTION INTRAMUSCULAR; INTRAVENOUS at 11:21

## 2017-01-01 RX ADMIN — SODIUM CHLORIDE, PRESERVATIVE FREE 300 UNITS: 5 INJECTION INTRAVENOUS at 10:21

## 2017-01-01 RX ADMIN — MORPHINE SULFATE 4 MG: 2 INJECTION, SOLUTION INTRAMUSCULAR; INTRAVENOUS at 09:04

## 2017-01-01 RX ADMIN — LIDOCAINE HYDROCHLORIDE 7 ML: 40 SOLUTION TOPICAL at 14:22

## 2017-01-01 RX ADMIN — Medication 400 MG: at 08:51

## 2017-01-01 RX ADMIN — AMLODIPINE BESYLATE 5 MG: 5 TABLET ORAL at 08:53

## 2017-01-01 RX ADMIN — HYDROCODONE BITARTRATE AND ACETAMINOPHEN 1 TABLET: 5; 325 TABLET ORAL at 16:20

## 2017-01-01 RX ADMIN — Medication 20 ML: at 06:00

## 2017-01-01 RX ADMIN — GLYBURIDE 1.25 MG: 2.5 TABLET ORAL at 17:14

## 2017-01-01 RX ADMIN — HYDROMORPHONE HYDROCHLORIDE 1 MG: 1 INJECTION, SOLUTION INTRAMUSCULAR; INTRAVENOUS; SUBCUTANEOUS at 22:20

## 2017-01-01 RX ADMIN — INSULIN LISPRO 2 UNITS: 100 INJECTION, SOLUTION INTRAVENOUS; SUBCUTANEOUS at 11:49

## 2017-01-01 RX ADMIN — MORPHINE SULFATE 8 MG: 10 INJECTION INTRAVENOUS at 10:21

## 2017-01-01 RX ADMIN — MORPHINE SULFATE 2 MG: 2 INJECTION, SOLUTION INTRAMUSCULAR; INTRAVENOUS at 04:17

## 2017-01-01 RX ADMIN — LORAZEPAM 1 MG: 2 INJECTION INTRAMUSCULAR; INTRAVENOUS at 21:33

## 2017-01-01 RX ADMIN — STANDARDIZED SENNA CONCENTRATE AND DOCUSATE SODIUM 2 TABLET: 8.6; 5 TABLET, FILM COATED ORAL at 08:00

## 2017-01-01 RX ADMIN — Medication 10 ML: at 05:12

## 2017-01-01 RX ADMIN — HYDROCODONE BITARTRATE AND HOMATROPINE METHYLBROMIDE 5 ML: 5; 1.5 SOLUTION ORAL at 22:20

## 2017-01-01 RX ADMIN — GLYBURIDE 1.25 MG: 2.5 TABLET ORAL at 16:04

## 2017-01-01 RX ADMIN — SODIUM CHLORIDE, PRESERVATIVE FREE 300 UNITS: 5 INJECTION INTRAVENOUS at 09:33

## 2017-01-01 RX ADMIN — Medication 400 MG: at 23:02

## 2017-01-01 RX ADMIN — FENTANYL CITRATE 50 MCG: 50 INJECTION, SOLUTION INTRAMUSCULAR; INTRAVENOUS at 14:18

## 2017-01-01 RX ADMIN — MORPHINE SULFATE 1 MG: 2 INJECTION, SOLUTION INTRAMUSCULAR; INTRAVENOUS at 03:46

## 2017-01-01 RX ADMIN — Medication 400 MG: at 21:44

## 2017-01-01 RX ADMIN — MORPHINE SULFATE 2 MG: 2 INJECTION, SOLUTION INTRAMUSCULAR; INTRAVENOUS at 19:55

## 2017-01-01 RX ADMIN — SODIUM CHLORIDE, PRESERVATIVE FREE 300 UNITS: 5 INJECTION INTRAVENOUS at 04:17

## 2017-01-01 RX ADMIN — DOCUSATE SODIUM 100 MG: 100 CAPSULE, LIQUID FILLED ORAL at 07:27

## 2017-01-01 RX ADMIN — HYDROCODONE BITARTRATE AND ACETAMINOPHEN 1 TABLET: 5; 325 TABLET ORAL at 18:16

## 2017-01-01 RX ADMIN — HALOPERIDOL LACTATE 2 MG: 5 INJECTION, SOLUTION INTRAMUSCULAR at 06:51

## 2017-01-01 RX ADMIN — FUROSEMIDE 20 MG: 20 TABLET ORAL at 07:45

## 2017-01-01 RX ADMIN — MORPHINE SULFATE 2 MG: 2 INJECTION, SOLUTION INTRAMUSCULAR; INTRAVENOUS at 18:56

## 2017-01-01 RX ADMIN — SODIUM CHLORIDE 75 ML/HR: 900 INJECTION, SOLUTION INTRAVENOUS at 18:20

## 2017-01-01 RX ADMIN — CEFTRIAXONE SODIUM 1 G: 1 INJECTION, POWDER, FOR SOLUTION INTRAMUSCULAR; INTRAVENOUS at 18:13

## 2017-01-01 RX ADMIN — SODIUM CHLORIDE, PRESERVATIVE FREE 10 ML: 5 INJECTION INTRAVENOUS at 00:48

## 2017-02-22 PROBLEM — K58.2 IRRITABLE BOWEL SYNDROME WITH BOTH CONSTIPATION AND DIARRHEA: Status: ACTIVE | Noted: 2017-01-01

## 2017-05-22 PROBLEM — I35.9 AORTIC VALVE DISEASE: Status: ACTIVE | Noted: 2017-01-01

## 2017-05-22 PROBLEM — R06.02 SOB (SHORTNESS OF BREATH): Status: ACTIVE | Noted: 2017-01-01

## 2017-06-01 PROBLEM — R06.02 SOB (SHORTNESS OF BREATH): Status: RESOLVED | Noted: 2017-01-01 | Resolved: 2017-01-01

## 2017-06-14 PROBLEM — S90.852A FOREIGN BODY IN FOOT, LEFT: Status: ACTIVE | Noted: 2017-01-01

## 2017-06-14 PROBLEM — E11.8 CONTROLLED TYPE 2 DIABETES MELLITUS WITH COMPLICATION, WITHOUT LONG-TERM CURRENT USE OF INSULIN (HCC): Status: ACTIVE | Noted: 2017-01-01

## 2017-06-22 PROBLEM — E87.20 METABOLIC ACIDOSIS: Status: ACTIVE | Noted: 2017-01-01

## 2017-06-22 PROBLEM — S90.852A FOREIGN BODY IN FOOT, LEFT: Status: RESOLVED | Noted: 2017-01-01 | Resolved: 2017-01-01

## 2017-07-18 PROBLEM — E11.9 COMPREHENSIVE DIABETIC FOOT EXAMINATION, TYPE 2 DM, ENCOUNTER FOR (HCC): Status: ACTIVE | Noted: 2017-01-01

## 2017-07-18 PROBLEM — E11.9 CONTROLLED TYPE 2 DIABETES MELLITUS WITHOUT COMPLICATION, WITHOUT LONG-TERM CURRENT USE OF INSULIN (HCC): Status: ACTIVE | Noted: 2017-01-01

## 2017-09-15 PROBLEM — N17.9 AKI (ACUTE KIDNEY INJURY) (HCC): Status: ACTIVE | Noted: 2017-01-01

## 2017-09-15 PROBLEM — E83.52 HYPERCALCEMIA: Status: ACTIVE | Noted: 2017-01-01

## 2017-09-15 PROBLEM — C80.1 METASTASIS TO BONE OF UNKNOWN PRIMARY (HCC): Status: ACTIVE | Noted: 2017-01-01

## 2017-09-15 PROBLEM — C79.51 METASTASIS TO BONE OF UNKNOWN PRIMARY (HCC): Status: ACTIVE | Noted: 2017-01-01

## 2017-09-15 NOTE — PROGRESS NOTES
END OF SHIFT NOTE:    Intake/Output      Voiding: YES  Catheter: NO  Drain:              Stool:  0 occurrences. Emesis:  0 occurrences. VITAL SIGNS  Patient Vitals for the past 12 hrs:   Temp Pulse Resp BP SpO2   09/15/17 1440 97.8 °F (36.6 °C) (!) 104 16 150/72 97 %   09/15/17 1030 97.8 °F (36.6 °C) (!) 106 16 163/88 98 %       Pain Assessment  Pain 1  Pain Scale 1: Numeric (0 - 10) (09/15/17 1817)  Pain Intensity 1: 10 (09/15/17 1817)  Patient Stated Pain Goal: 0 (09/15/17 1817)  Pain Reassessment 1: Yes (09/15/17 1330)  Pain Location 1: Back (09/15/17 1817)  Pain Intervention(s) 1: Medication (see MAR) (09/15/17 1817)    Ambulating  Yes    Additional Information:     Shift report given to oncoming nurse at the bedside.     Laura Diallo

## 2017-09-15 NOTE — PROGRESS NOTES
PICC PLACEMENT NOTE    PRE-PROCEDURE VERIFICATION    Correct Patient: Yes (Time out preformed)Ghada Arenas rn in agreement with time out. Consent Procedure: Yes  Appropriate Site: Yes    Temperature: Temp: 97.8 °F (36.6 °C), Temperature Source: Temp Source: Oral    Recent Labs      09/15/17   1117   BUN  47*   CREA  2.23*   PLT  150   WBC  8.3       Allergies: Review of patient's allergies indicates no known allergies. Education materials for PICC Care given to family: yes. See Patient Education activity for further details. PICC Booklet placed on bedside table. PROCEDURE DETAIL  A double lumen PICC line was started for vascular access. The following documentation is in addition to the PICC properties in the lines/airways flowsheet :  Lot #: [id number]  xylocaine used: yes  Mid-Arm Circumference: 41 (cm)  Internal Catheter Length: 46 (cm)  Internal Catheter Total Length: 46 (cm)  Vein Selection for PICC:left basilic  Central Line Bundle followed yes  Complication Related to Insertion: none  Ports flushed with positive blood return in each port. Guidewires removed intact prior to chest x-ray. Post chest x-ray picc at azygous vein level, picc retracted 2cm with good blood return and flushed well. The placement was verified by X-ray: yes. The x-ray results state the tip location is on the left side and the tip overlies the upper superior vena cava. Primary nurse notified.     Line is okay to use: yes      Nathan Garrett RN

## 2017-09-15 NOTE — H&P
[facility]: Admission H/P    Chief Complaint:    Bone metastasis  Hypercalcemia  ILYA  Pain  Poor oral intake    History of Present Illness:  Ms. Mya Simpson is a 69 y.o. white female with a history of aortic valve disease s/p  Ross procedure in 1997 with complete AV block post-procedure and pacemaker implantation, paroxysmal atrial fibrillation, stage III chronic kidney disease, type II diabetes, metabolic acidosis, IBS, hypothyroidism s/p total thyroidectomy in 1986, hypertension, hyperlipidemia, vertigo, chronic back pain, GERD, hepatitis A, hysterectomy in 1986, morbid obesity, anemia, osteoarthritis of left knee s/p total knee replacement on 2/28/11, benign left breast lumpectomy and tonsillectomy. She initially presented to PCP on 7/10/2017 reporting a one week history of cold and cough. She stated she had been coughing so much that she had developed rib and back pain. Review of systems and physical exam was consistent with acute bronchitis. She was prescribed Hycodan and inhaled steroids and sent for chest x-ray to rule out atypical pneumonia. X-ray, completed on 7/12/17, was within normal limits.      Patient was seen urgently by her cardiologist, Dr. Adonay Powers on 7/19/17 reporting back pain radiating around to her chest worsening with deep breathing, coughing and turning as well as shortness of breath. Echo completed on 8/4/17 showed EF >55%, and cardiac work-up determined that symptoms did not appear to be true cardiac related.       Patient requested referral to pulmonology. Repeat chest x-ray was performed on 8/14/17 as well as a nuclear medicine lung ventilation perfusion scan. Chest x-ray showed no acute cardiopulmonary process, and pulmonary perfusion scan was normal with no evidence of acute pulmonary embolism.  Of note, patient also underwent bilateral diagnostic screening mammogram on 8/14/17 which showed no suspicious finding in either breast.      Patient was also seen by her nephrologist, Dr. Shalonda Turpin in August of 2017 who ordered bone scan was completed on 8/30/17 demonstrating multifocal radiotracer activity concerning for metastatic disease. CT imaging of the chest, abdomen and pelvis was recommended for further assessment to identify potential primary neoplasm. Upon receiving results of bone scan, patient was referred to [facility] for oncology evaluation. CT scan of the chest, abdomen and pelvis are scheduled to be performed on 9/15/17.      CHEST X-RAY, 2 VIEWS 8/14/2017  IMPRESSION:   1.  No acute cardiopulmonary process evident by plain film imaging. Only stable chronic appearing changes are seen as described above.     NUCLEAR MEDICINE LUNG VENTILATION PERFUSION SCAN 8/14/2017  IMPRESSION:  Normal pulmonary perfusion scan. No evidence of acute pulmonary embolism.     DIAGNOSTIC DIGITAL BILATERAL MAMMOGRAPHY 8/14/2017  IMPRESSION: No suspicious finding in either breast.     WHOLE-BODY BONE SCAN: 08/30/2017  IMPRESSION: Multifocal radiotracer activity concerning for metastatic disease. Consider CT imaging of the chest, abdomen and pelvis for further assessment to identify potential primary neoplasm.       She was seen in oncology office at the end of 9/14,  lab returned showed hypercalcemia and ILYA, called pt back to arrange admission. Review of Systems:  Constitutional Denies fever or chills. Positive for weight loss or appetite changes. Positive for fatigue. Denies anorexia. Patient states she sleeps during the day and poorly at night    HEENT Denies trauma, ear pain, nosebleeds, sore throat, neck pain and ear discharge. Complains of blurred vision. History of pink eye or eye infection. Positive for hearing loss, vertigo, chronic stuffy nose, and hoarseness. Skin Denies lesions or rashes. Lungs Denies cough, sputum production or hemoptysis. Positive for shortness of breath and chest pain    Cardiovascular Denies chest pain, palpitations, orthopnea, and claudication.   Patient complains of shortness of breath with exertion and flat in the bed, foot or leg swelling. Gastrointestinal Denies nausea and  vomiting. Denies bloody or black stools. Denies abdominal pain. Patient complains of nausea and/or vomiting and constipation or diarrhea.  Denies dysuria, frequency or hesitancy of urination. Wakes 1 time at night to urinated    Neuro Denies headaches, visual changes or ataxia. Denies dizziness, tingling, tremors, sensory change, speech change, focal weakness and headaches. Hematology Denies nasal/gum bleeding, denies easy bruise   Endo Denies heat/cold intolerance, positive for diabetes and thyroid disorder. MSK Denies swollen legs, myalgias and falls. Patient complains of joint pain, back pain or tenderness, muscle pain or tenderness, and history of gout or uric acid. Psychiatric/Behavioral Denies depression and substance abuse.  The patient is not nervous/anxious.        No Known Allergies  Past Medical History:   Diagnosis Date    Aortic stenosis     AVR 1997    AV block, 3rd degree (HCC) 2/13/2016    Cardiac pacemaker     Chest pain     Chronic kidney disease (CKD), stage III (moderate)     Chronic pain      lower back    Diabetes mellitus, type II (HCC)     GERD (gastroesophageal reflux disease)     controlled with medication    Hepatitis A     dx after hysterectomy 1986 - pt states no problems now    Hyperlipidemia     Hypertension     controlled with medication    Hypertension, essential     hypothyroidism     hx of total thyroidectomy in 1986    IBS (irritable bowel syndrome)     pt states has hx of diverticulitis     Morbid obesity (HCC)     bmi=40    Osteoarthritis of left knee 2/28/2011    Paroxysmal atrial fibrillation (HCC)     S/P total knee replacement using cement 2/28/2011    S/P total knee replacement using cement 2/28/2011    Vertigo, benign positional      Past Surgical History:   Procedure Laterality Date    CARDIAC SURG PROCEDURE UNLIST  1997    mitral valve replacement \"Ross Procedure\" per pt    HX BREAST LUMPECTOMY  1993    left breast - benign per pt    HX HYSTERECTOMY  1986    HX ORTHOPAEDIC  2/2011    left knee replacement    HX OTHER SURGICAL  1996    basal cell carcinoma removed from face   [address]    pacemaker placed in 1997 due to \"complete heart block\" after mitral valve replacement - 100 % dependant on pacemaker    HX TONSILLECTOMY  as a child    THYROIDECTOMY  1986    total     Family History   Problem Relation Age of Onset    Cancer Mother      breast    Breast Cancer Mother     Heart Attack Father     Heart Failure Father     Kidney Disease Father     Cancer Maternal Aunt      Breast CA    Breast Cancer Maternal Aunt     Cancer Maternal Grandmother      Breast CA    Breast Cancer Maternal Grandmother     Heart Disease Maternal Aunt     Malignant Hyperthermia Neg Hx     Pseudocholinesterase Deficiency Neg Hx     Delayed Awakening Neg Hx     Post-op Nausea/Vomiting Neg Hx     Emergence Delirium Neg Hx     Post-op Cognitive Dysfunction Neg Hx     Other Neg Hx      Social History     Social History    Marital status:      Spouse name: N/A    Number of children: N/A    Years of education: N/A     Occupational History          Social History Main Topics    Smoking status: Never Smoker    Smokeless tobacco: Never Used    Alcohol use No    Drug use: No    Sexual activity: Not on file     Other Topics Concern    Not on file     Social History Narrative     Current Facility-Administered Medications   Medication Dose Route Frequency Provider Last Rate Last Dose    amLODIPine (NORVASC) tablet 2.5 mg  2.5 mg Oral DAILY Azam Rucker NP   2.5 mg at 09/15/17 1234    glyBURIDE (DIABETA) tablet 1.25 mg  1.25 mg Oral BID WITH MEALS Azam Rucker NP        HYDROcodone-acetaminophen (NORCO) 5-325 mg per tablet 1 Tab  1 Tab Oral Q6H PRN Azam Rucker NP   1 Tab at 09/15/17 1234    mirtazapine (REMERON) tablet 7.5 mg  7.5 mg Oral QHS Azam Rucker NP        [START ON 9/16/2017] pantoprazole (PROTONIX) tablet 40 mg  40 mg Oral ACB Azam Rucker NP        rivaroxaban (XARELTO) tablet 15 mg  15 mg Oral DAILY WITH DINNER Azam Rucker NP        [START ON 9/16/2017] levothyroxine (SYNTHROID) tablet 125 mcg  125 mcg Oral ACB Azam Rucker NP        ondansetron (ZOFRAN ODT) tablet 8 mg  8 mg Oral Q8H PRN Azam Rucker NP        ondansetron [facility]) injection 4 mg  4 mg IntraVENous Q4H PRN Azam Rucker NP        morphine injection 2 mg  2 mg IntraVENous Q4H PRN Azam Rucker NP        0.9% sodium chloride infusion  200 mL/hr IntraVENous CONTINUOUS Azam Rucker NP        0.9% sodium chloride infusion 1,000 mL  1,000 mL IntraVENous ONCE Azam Rucker NP       [facility] [START ON 9/16/2017] valsartan/hydroCHLOROthiazide (DIOVAN HCT) 320/25 mg   Oral DAILY Marj Soliz MD           OBJECTIVE:  Visit Vitals    /88 (BP 1 Location: Left arm)    Pulse (!) 106    Temp 97.8 °F (36.6 °C)    Resp 16    SpO2 98%       Physical Exam:  Constitutional: Oriented to person, place, and time. Well-developed and well-nourished. HEENT: Normocephalic and atraumatic. Oropharynx is clear and moist.   Conjunctivae and EOM are normal. Pupils are equal, round, and reactive to light. No scleral icterus. Neck supple. No JVD present. No tracheal deviation present. No thyromegaly present. Lymph node No palpable submandibular, cervical, supraclavicular, axillary and inguinal lymph nodes. Skin Warm and dry. No bruising and no rash noted. No erythema. No pallor. Respiratory Effort normal and breath sounds normal.  No respiratory distress. No wheezes. No rales. No tenderness. CVS Normal rate, regular rhythm and normal heart sounds. Exam reveals no gallop, no friction and no rub. No murmur heard. Abdomen Soft. Bowel sounds are normal. Exhibits no distension. There is no tenderness.  There is no rebound and no guarding. Neuro Normal reflexes. No cranial nerve deficit. Exhibits normal muscle tone, 5 of 5 strength of all extremities. MSK Multiple tender spots. Normal range of motion in general.   Psych Normal mood, affect, behavior, judgment and thought content      Labs:  Recent Results (from the past 24 hour(s))   METABOLIC PANEL, COMPREHENSIVE    Collection Time: 09/14/17  3:23 PM   Result Value Ref Range    Sodium 135 (L) 136 - 145 mmol/L    Potassium 4.3 3.5 - 5.1 mmol/L    Chloride 104 98 - 107 mmol/L    CO2 19 (L) 21 - 32 mmol/L    Anion gap 12 mmol/L    Glucose 121 (H) 65 - 100 mg/dL    BUN 44 (H) 8 - 23 MG/DL    Creatinine 2.10 (H) 0.6 - 1.0 MG/DL    GFR est AA 29 ml/min/1.73m2    GFR est non-AA 24 ml/min/1.73m2    Calcium 13.2 (HH) 8.3 - 10.4 MG/DL    Bilirubin, total 0.7 0.2 - 1.1 MG/DL    ALT (SGPT) 39 12 - 65 U/L    AST (SGOT) 89 (H) 15 - 37 U/L    Alk. phosphatase 218 (H) 50 - 136 U/L    Protein, total 7.8 6.3 - 8.2 g/dL    Albumin 3.7 3.2 - 4.6 g/dL    Globulin 4.1 g/dL    A-G Ratio 0.9     CBC WITH AUTOMATED DIFF    Collection Time: 09/14/17  3:23 PM   Result Value Ref Range    WBC 8.3 4.3 - 11.1 K/uL    RBC 3.78 (L) 4.05 - 5.25 M/uL    HGB 11.9 11.7 - 15.4 g/dL    HCT 35.0 (L) 35.8 - 46.3 %    MCV 92.6 79.6 - 97.8 FL    MCH 31.5 26.1 - 32.9 PG    MCHC 34.0 31.4 - 35.0 g/dL    RDW 14.6 11.9 - 14.6 %    PLATELET 381 930 - 325 K/uL    MPV 10.5 (L) 10.8 - 14.1 FL    ABSOLUTE NRBC 0.00 0.0 - 0.2 K/uL    DF AUTOMATED      NEUTROPHILS 66 43 - 78 %    LYMPHOCYTES 22 13 - 44 %    MONOCYTES 10 4.0 - 12.0 %    EOSINOPHILS 2 0.5 - 7.8 %    BASOPHILS 1 0.0 - 2.0 %    ABS. NEUTROPHILS 5.5 1.7 - 8.2 K/UL    ABS. LYMPHOCYTES 1.9 0.5 - 4.6 K/UL    ABS. MONOCYTES 0.8 0.1 - 1.3 K/UL    ABS. EOSINOPHILS 0.1 0.0 - 0.8 K/UL    ABS.  BASOPHILS 0.1 0.0 - 0.2 K/UL   CANCER AG 19-9    Collection Time: 09/14/17  3:23 PM   Result Value Ref Range    Cancer antigen 19-9 800.80 (H) 2.0 - 37.0 U/mL   CEA    Collection Time: 09/14/17  3:23 PM   Result Value Ref Range    CEA 2016.6 (H) 0.0 - 3.0 ng/mL   METABOLIC PANEL, COMPREHENSIVE    Collection Time: 09/15/17 11:17 AM   Result Value Ref Range    Sodium 136 136 - 145 mmol/L    Potassium 4.1 3.5 - 5.1 mmol/L    Chloride 105 98 - 107 mmol/L    CO2 20 (L) 21 - 32 mmol/L    Anion gap 11 7 - 16 mmol/L    Glucose 80 65 - 100 mg/dL    BUN 47 (H) 8 - 23 MG/DL    Creatinine 2.23 (H) 0.6 - 1.0 MG/DL    GFR est AA 27 (L) >60 ml/min/1.73m2    GFR est non-AA 23 (L) >60 ml/min/1.73m2    Calcium 14.1 (HH) 8.3 - 10.4 MG/DL    Bilirubin, total 0.6 0.2 - 1.1 MG/DL    ALT (SGPT) 42 12 - 65 U/L    AST (SGOT) 112 (H) 15 - 37 U/L    Alk. phosphatase 230 (H) 50 - 136 U/L    Protein, total 8.1 6.3 - 8.2 g/dL    Albumin 3.8 3.2 - 4.6 g/dL    Globulin 4.3 (H) 2.3 - 3.5 g/dL    A-G Ratio 0.9 (L) 1.2 - 3.5     MAGNESIUM    Collection Time: 09/15/17 11:17 AM   Result Value Ref Range    Magnesium 1.8 1.8 - 2.4 mg/dL   CBC WITH AUTOMATED DIFF    Collection Time: 09/15/17 11:17 AM   Result Value Ref Range    WBC 8.3 4.3 - 11.1 K/uL    RBC 3.98 (L) 4.05 - 5.25 M/uL    HGB 12.3 11.7 - 15.4 g/dL    HCT 36.2 35.8 - 46.3 %    MCV 91.0 79.6 - 97.8 FL    MCH 30.9 26.1 - 32.9 PG    MCHC 34.0 31.4 - 35.0 g/dL    RDW 15.1 (H) 11.9 - 14.6 %    PLATELET 384 749 - 617 K/uL    MPV 9.8 (L) 10.8 - 14.1 FL    DF AUTOMATED      NEUTROPHILS 66 43 - 78 %    LYMPHOCYTES 23 13 - 44 %    MONOCYTES 10 4.0 - 12.0 %    EOSINOPHILS 1 0.5 - 7.8 %    BASOPHILS 0 0.0 - 2.0 %    IMMATURE GRANULOCYTES 0.4 0.0 - 5.0 %    ABS. NEUTROPHILS 5.5 1.7 - 8.2 K/UL    ABS. LYMPHOCYTES 1.9 0.5 - 4.6 K/UL    ABS. MONOCYTES 0.8 0.1 - 1.3 K/UL    ABS. EOSINOPHILS 0.1 0.0 - 0.8 K/UL    ABS. BASOPHILS 0.0 0.0 - 0.2 K/UL    ABS. IMM. GRANS. 0.0 0.0 - 0.5 K/UL       Imaging:  No results found for this or any previous visit. ASSESSMENT/PLAN:  1. ILYA (acute kidney injury) ([facility] N17.9 584. 9     2. Hypercalcemia E83.52 275.42     3.  Bone metastases (HCC) C79.51 198.5     4. Disseminated malignancy of unknown primary (HCC) C80.0 199.0       C80.1 199. 1     5. Rib pain R07.81 786.50     6. Elevated serum creatinine F66.81 343.06 METABOLIC PANEL, COMPREHENSIVE        Problem List  Date Reviewed: 9/15/2017          Codes Class Noted    Hypercalcemia ICD-10-CM: E83.52  ICD-9-CM: 275.42  9/15/2017        ILYA (acute kidney injury) [facility]) ICD-10-CM: N17.9  ICD-9-CM: 584.9  9/15/2017        Metastasis to bone of unknown primary [facility]) ICD-10-CM: C79.51, C80.1  ICD-9-CM: 198.5, 199.1  9/15/2017        Controlled type 2 diabetes mellitus without complication, without long-term current use of insulin ([facility] ICD-10-CM: E11.9  ICD-9-CM: 250.00  7/18/2017        Comprehensive diabetic foot examination, type 2 DM, encounter for [facility]) ICD-10-CM: E11.9  ICD-9-CM: 250.00  3/84/0263        Metabolic acidosis ZRY-21-: E87.2  ICD-9-CM: 276.2  6/22/2017        Controlled type 2 diabetes mellitus with complication, without long-term current use of insulin ([facility] ICD-10-CM: E11.8  ICD-9-CM: 250.90  6/14/2017        Aortic valve disease ICD-10-CM: I35.9  ICD-9-CM: 424.1  5/22/2017        Irritable bowel syndrome with both constipation and diarrhea ICD-10-CM: K58.2  ICD-9-CM: 564.1  2/22/2017        Acquired hypothyroidism ICD-10-CM: E03.9  ICD-9-CM: 244.9  12/15/2016        Cardiac pacemaker ICD-10-CM: Z95.0  ICD-9-CM: V45.01  Unknown        Hypertension, essential ICD-10-CM: I10  ICD-9-CM: 401.9  Unknown        Hyperlipidemia ICD-10-CM: E78.5  ICD-9-CM: 272.4  Unknown        Paroxysmal atrial fibrillation (HCC) ICD-10-CM: I48.0  ICD-9-CM: 427.31  Unknown        Chronic kidney disease (CKD), stage III (moderate) ICD-10-CM: N18.3  ICD-9-CM: 126. 3  Unknown    Overview Signed 12/15/2016 10:08 AM by Gely Doty MD     Followed by Dr. Glenda Garcia             Vertigo, benign positional ICD-10-CM: H81.10  ICD-9-CM: 386.11  Unknown          69 y.o. F consulted for multiple bone metastases of unknown source. We discussed this is most likely malignancy although SPEP is negative but myeloma actually is usu negative for bone scan, agreed with CT C/A/P, norco prn pain, remeron for anorexia and weight loss, check tumor markers, last colonoscopy done in 2009 and will discuss whether need to repeat. However lab returned showed hypercalcemia and ILYA, called pt back to arrange admission, need aggressive resuscitation IVF, monitor Cr and Nabeel level and add bisphophonate/calcitonin as needed, tumor marker very high for CEA/, highly suspect abd malignancy and arrange CT and biopsy once Cr improved, pain control. Rishabh Hinds M.D.   [facility] [address]  Office : [phone]  Fax : [phone]

## 2017-09-16 NOTE — PROGRESS NOTES
END OF SHIFT NOTE:    Patient urinating frequently, only 100-150 ml per occurrence, clear yellow but malodorous. May need UA. PRN pain meds given for back/chest pain. Intake/Output  09/15 1901 - 09/16 0700  In: 2957 [I.V.:2957]  Out: 650 [Urine:650]   Voiding: YES  Catheter: NO  Drain:              Stool:  0 occurrences. Emesis:  0 occurrences. VITAL SIGNS  Patient Vitals for the past 12 hrs:   Temp Pulse Resp BP SpO2   09/16/17 0312 96.9 °F (36.1 °C) (!) 114 18 155/81 95 %   09/15/17 2344 97.8 °F (36.6 °C) 100 18 147/76 95 %   09/15/17 1935 98.3 °F (36.8 °C) 98 18 149/61 98 %       Pain Assessment  Pain 1  Pain Scale 1: Numeric (0 - 10) (09/16/17 0304)  Pain Intensity 1: 0 (09/16/17 0304)  Patient Stated Pain Goal: 0 (09/16/17 0304)  Pain Reassessment 1: Yes (09/16/17 0049)  Pain Onset 1: pta (09/16/17 0017)  Pain Location 1: Back; Chest (09/16/17 0017)  Pain Orientation 1: Lower; Lateral (09/16/17 0017)  Pain Description 1: Constant;Dull;Aching (09/16/17 0017)  Pain Intervention(s) 1: Medication (see MAR) (09/16/17 0017)    Ambulating  Yes    Additional Information:    Shift report to be given to oncoming nurse at the bedside.     Luana Lara

## 2017-09-16 NOTE — PROGRESS NOTES
Spoke with Javier Bray NP regarding difference in I's and O's. Reviewed urinalysis results. New order received and processed for Rocephin 1 gram IV.  Pt made aware

## 2017-09-16 NOTE — PROGRESS NOTES
[facility]        Inpatient Hematology / Oncology Progress Note      Admission Date: 9/15/2017 10:20 AM  Reason for Admission/Hospital Course: Unknown Malignancy  Hypercalcemia  Acute Renal Insufficency  Hypercalcemia  ILYA (acute kidney injury) ([facility]  Metastasis to bone of unknown primary (HCC)      24 Hour Events:  Kidney fx improving - Cr down to 1.69  Hypercalcemia improving - Rosey Ca++ 12.48  Plan to get CT CAP and biopsy once Cr improves      ROS:  Constitutional: Negative for fever, chills, weakness, malaise, fatigue. CV: Negative for chest pain, palpitations, edema. Respiratory: Negative for dyspnea, cough, wheezing. GI: Negative for nausea, abdominal pain, diarrhea. 10 point review of systems is otherwise negative with the exception of the elements mentioned above in the HPI. No Known Allergies    OBJECTIVE:  Patient Vitals for the past 8 hrs:   BP Temp Pulse Resp SpO2 Weight   17 0647 155/77 97.9 °F (36.6 °C) (!) 102 18 97 % -   17 0313 - - - - - 201 lb 12.8 oz (91.5 kg)   17 0312 155/81 96.9 °F (36.1 °C) (!) 114 18 95 % -     Temp (24hrs), Av.8 °F (36.6 °C), Min:96.9 °F (36.1 °C), Max:98.3 °F (36.8 °C)     0701 -  1900  In: 861 [I.V.:861]  Out: 300 [Urine:300]    Physical Exam:  Constitutional: Well developed, well nourished female in no acute distress, lying comfortably in the hospital bed. HEENT: Normocephalic and atraumatic. Oropharynx is clear, mucous membranes are moist.  Extraocular muscles are intact. Sclerae anicteric. Neck supple without JVD. No thyromegaly present. Lymph node   Deferred   Skin Warm and dry. No bruising and no rash noted. No erythema. No pallor. Respiratory Lungs are clear to auscultation bilaterally without wheezes, rales or rhonchi, normal air exchange without accessory muscle use. CVS Mildly tachycardic rate, regular rhythm and normal S1 and S2. No murmurs, gallops, or rubs.    Abdomen Soft, nontender and nondistended, normoactive bowel sounds. No palpable mass. No hepatosplenomegaly. Neuro Grossly nonfocal with no obvious sensory or motor deficits. MSK Normal range of motion in general.  No tenderness. 2+ BLE edema   Psych Appropriate mood and affect. Labs:    Recent Labs      09/15/17   1117  09/14/17   1523   WBC  8.3  8.3   RBC  3.98*  3.78*   HGB  12.3  11.9   HCT  36.2  35.0*   MCV  91.0  92.6   MCH  30.9  31.5   MCHC  34.0  34.0   RDW  15.1*  14.6   PLT  150  155   GRANS  66  66   LYMPH  23  22   MONOS  10  10   EOS  1  2   BASOS  0  1   IG  0.4   --    DF  AUTOMATED  AUTOMATED   ANEU  5.5  5.5   ABL  1.9  1.9   ABM  0.8  0.8   FRANKO  0.1  0.1   ABB  0.0  0.1   AIG  0.0   --       Recent Labs      09/16/17   0304  09/15/17   1117  09/14/17   1523   NA  140  136  135*   K  4.1  4.1  4.3   CL  110*  105  104   CO2  19*  20*  19*   AGAP  11  11  12   GLU  73  80  121*   BUN  39*  47*  44*   CREA  1.69*  2.23*  2.10*   GFRAA  38*  27*  29   GFRNA  31*  23*  24   CA  11.6*  14.1*  13.2*   SGOT  99*  112*  89*   AP  206*  230*  218*   TP  6.8  8.1  7.8   ALB  2.9*  3.8  3.7   GLOB  3.9*  4.3*  4.1   AGRAT  0.7*  0.9*  0.9   MG  1.5*  1.8   --          Imaging:  XR CHEST SNGL V [[id number]] Collected: 09/15/17 1506      Order Status: Completed Updated: 09/15/17 1509     Narrative:       Single portable upright chest x-ray September 15, 2017    Reference exam: August 14, 2017    INDICATION: PICC line placement    FINDINGS: Right-sided electronic device with median sternotomy clips and wires  are seen with lungs underinflated. There is minimal stranding blurring the  vascular borders, left-sided PICC line tip overlies the upper superior vena cava  at the azygos level.       Impression:       IMPRESSION: Catheter tip overlies the upper superior vena cava.          ASSESSMENT:    Problem List  Date Reviewed: 9/15/2017          Codes Class Noted    Hypercalcemia ICD-10-CM: E83.52  ICD-9-CM: 275.42 9/15/2017        ILYA (acute kidney injury) [facility]) ICD-10-CM: N17.9  ICD-9-CM: 584.9  9/15/2017        Metastasis to bone of unknown primary [facility]) ICD-10-CM: C79.51, C80.1  ICD-9-CM: 198.5, 199.1  9/15/2017        Controlled type 2 diabetes mellitus without complication, without long-term current use of insulin ([facility] ICD-10-CM: E11.9  ICD-9-CM: 250.00  7/18/2017        Comprehensive diabetic foot examination, type 2 DM, encounter for [facility]) ICD-10-CM: E11.9  ICD-9-CM: 250.00  4/71/1521        Metabolic acidosis CSB-69-RAVINDER: E87.2  ICD-9-CM: 276.2  6/22/2017        Controlled type 2 diabetes mellitus with complication, without long-term current use of insulin ([facility] ICD-10-CM: E11.8  ICD-9-CM: 250.90  6/14/2017        Aortic valve disease ICD-10-CM: I35.9  ICD-9-CM: 424.1  5/22/2017        Irritable bowel syndrome with both constipation and diarrhea ICD-10-CM: K58.2  ICD-9-CM: 564.1  2/22/2017        Acquired hypothyroidism ICD-10-CM: E03.9  ICD-9-CM: 244.9  12/15/2016        Cardiac pacemaker ICD-10-CM: Z95.0  ICD-9-CM: V45.01  Unknown        Hypertension, essential ICD-10-CM: I10  ICD-9-CM: 401.9  Unknown        Hyperlipidemia ICD-10-CM: E78.5  ICD-9-CM: 272.4  Unknown        Paroxysmal atrial fibrillation (HCC) ICD-10-CM: I48.0  ICD-9-CM: 427.31  Unknown        Chronic kidney disease (CKD), stage III (moderate) ICD-10-CM: N18.3  ICD-9-CM: 296. 3  Unknown    Overview Signed 12/15/2016 10:08 AM by Shena Canseco MD     Followed by Dr. Shemar Westfall             Vertigo, benign positional ICD-10-CM: H81.10  ICD-9-CM: 386.11  Unknown            69 y.o. F consulted for multiple bone metastases of unknown source. We discussed this is most likely malignancy although SPEP is negative but myeloma actually is usu negative for bone scan, agreed with CT C/A/P, norco prn pain, remeron for anorexia and weight loss, check tumor markers, last colonoscopy done in 2009 and will discuss whether need to repeat.  However lab returned showed hypercalcemia and ILYA, called pt back to arrange admission, need aggressive resuscitation IVF, monitor Cr and Nabeel level and add bisphophonate/calcitonin as needed, tumor marker very high for CEA/, highly suspect abd malignancy and arrange CT and biopsy once Cr improved, pain control. PLAN:  Multiple Bone Mets of unknown source  - SPEP neg, CT C/A/P, CEA and CA 19-9 elevated, highly suspect abd malignancy  9/16 Obtain CT and biopsy once Cr improves    ILYA  - Aggressive IVF  9/16 Cr improved to 1.69 from 2.23 yesterday. Con't aggressive IVF. Hypercalcemia  - IVF, add Aredia/calcitonin as needed  9/16 Corrected calcium down to 12.48 from 14.26 yesterday. Con't aggressive IVF. Electrolyte Imbalance  - Replete prn per Cyndi SOPs    Malodorous urine  9/16 Check UA, UCx    Continue home meds  Cyndi SOPs  DVT prophylaxis: On Xarelto            Wadie Hermanns, NP   [facility]  [address] [place] [address]  Office : [phone]  Fax : [phone]         Attending Addendum:  I personally evaluated the patient with Cyn Ying, NALEXANDREA.,  and agree with the assessment, findings and plan as documented. Appears stable, heart regular without murmur, we will continue hydration and follow her renal function.               Carlita Goyal MD  [address] [place] [address]  [id number] [place] [address]  Office : [phone]  Fax : [phone]

## 2017-09-17 NOTE — PROGRESS NOTES
END OF SHIFT NOTE:    Intake/Output  09/17 0701 - 09/17 1900  In: 026 [P.O.:358; I.V.:595]  Out: 0    Voiding: YES  Catheter: NO  Drain:              Stool:  0 occurrences. Emesis:  0 occurrences. VITAL SIGNS  Patient Vitals for the past 12 hrs:   Temp Pulse Resp BP SpO2   09/17/17 1530 97.7 °F (36.5 °C) 97 20 140/75 98 %   09/17/17 1117 98.6 °F (37 °C) 97 22 134/77 97 %   09/17/17 0730 98.1 °F (36.7 °C) 93 19 148/71 95 %       Pain Assessment  Pain 1  Pain Scale 1: Visual (09/17/17 1714)  Pain Intensity 1: 0 (09/17/17 1714)  Patient Stated Pain Goal: 0 (09/17/17 0537)  Pain Reassessment 1: Yes (09/17/17 1714)  Pain Onset 1: pta (09/17/17 0500)  Pain Location 1: Back (09/17/17 1620)  Pain Orientation 1: Upper (09/17/17 1620)  Pain Description 1: Aching (09/17/17 1620)  Pain Intervention(s) 1: Medication (see MAR) (09/17/17 1620)    Ambulating  Yes    Additional Information: Pt walked around bed with assist X2 and a cane. Will benefit from working with PT and needs a walker instead. Norco given X2 for back pain. Does not wish to take morphine beacause of how it made her feel. Pt constipated, given stool softeners and laxatives as ordered. Shift report will be given to oncoming nurse at the bedside.     Yousuf Andrade RN

## 2017-09-17 NOTE — PROGRESS NOTES
[facility]        Inpatient Hematology / Oncology Progress Note      Admission Date: 9/15/2017 10:20 AM  Reason for Admission/Hospital Course: Unknown Malignancy  Hypercalcemia  Acute Renal Insufficency  Hypercalcemia  ILYA (acute kidney injury) ([address]  Metastasis to bone of unknown primary (HCC)      24 Hour Events:  Kidney fx improving - Cr down to 1.34  Hypercalcemia improving - Rosey Ca++ 12.38  Plan to get CT CAP and biopsy once Cr improves      ROS:  Constitutional: Negative for fever, chills, weakness, malaise, fatigue. CV: Negative for chest pain, palpitations, edema. Respiratory: Negative for dyspnea, cough, wheezing. GI: Negative for nausea, abdominal pain, diarrhea. 10 point review of systems is otherwise negative with the exception of the elements mentioned above in the HPI. No Known Allergies    OBJECTIVE:  Patient Vitals for the past 8 hrs:   BP Temp Pulse Resp SpO2 Weight   17 0730 148/71 98.1 °F (36.7 °C) 93 19 95 % -   17 0617 - - - - - 201 lb 11.5 oz (91.5 kg)   17 0421 145/77 98.6 °F (37 °C) (!) 103 18 98 % -     Temp (24hrs), Av °F (36.7 °C), Min:96.9 °F (36.1 °C), Max:98.6 °F (37 °C)     0701 -  1900  In: 458 [P.O.:118; I.V.:595]  Out: 0     Physical Exam:  Constitutional: Well developed, well nourished female in no acute distress, lying comfortably in the hospital bed. HEENT: Normocephalic and atraumatic. Oropharynx is clear, mucous membranes are moist.  Extraocular muscles are intact. Sclerae anicteric. Neck supple without JVD. No thyromegaly present. Lymph node   Deferred   Skin Warm and dry. No bruising and no rash noted. No erythema. No pallor. Respiratory Lungs are clear to auscultation bilaterally without wheezes, rales or rhonchi, normal air exchange without accessory muscle use. CVS Normal rate, regular rhythm and normal S1 and S2. No murmurs, gallops, or rubs.    Abdomen Soft, nontender and nondistended, normoactive bowel sounds. No palpable mass. No hepatosplenomegaly. Neuro Grossly nonfocal with no obvious sensory or motor deficits. MSK Normal range of motion in general.  No tenderness. 2+ BLE edema   Psych Appropriate mood and affect. Labs:      Recent Labs      09/15/17   1117  09/14/17   1523   WBC  8.3  8.3   RBC  3.98*  3.78*   HGB  12.3  11.9   HCT  36.2  35.0*   MCV  91.0  92.6   MCH  30.9  31.5   MCHC  34.0  34.0   RDW  15.1*  14.6   PLT  150  155   GRANS  66  66   LYMPH  23  22   MONOS  10  10   EOS  1  2   BASOS  0  1   IG  0.4   --    DF  AUTOMATED  AUTOMATED   ANEU  5.5  5.5   ABL  1.9  1.9   ABM  0.8  0.8   FRANKO  0.1  0.1   ABB  0.0  0.1   AIG  0.0   --         Recent Labs      09/16/17   0304  09/15/17   1117  09/14/17   1523   NA  140  136  135*   K  4.1  4.1  4.3   CL  110*  105  104   CO2  19*  20*  19*   AGAP  11  11  12   GLU  73  80  121*   BUN  39*  47*  44*   CREA  1.69*  2.23*  2.10*   GFRAA  38*  27*  29   GFRNA  31*  23*  24   CA  11.6*  14.1*  13.2*   SGOT  99*  112*  89*   AP  206*  230*  218*   TP  6.8  8.1  7.8   ALB  2.9*  3.8  3.7   GLOB  3.9*  4.3*  4.1   AGRAT  0.7*  0.9*  0.9   MG  1.5*  1.8   --          Imaging:  XR CHEST SNGL V [[id number]] Collected: 09/15/17 1506      Order Status: Completed Updated: 09/15/17 1509     Narrative:       Single portable upright chest x-ray September 15, 2017    Reference exam: August 14, 2017    INDICATION: PICC line placement    FINDINGS: Right-sided electronic device with median sternotomy clips and wires  are seen with lungs underinflated. There is minimal stranding blurring the  vascular borders, left-sided PICC line tip overlies the upper superior vena cava  at the azygos level.       Impression:       IMPRESSION: Catheter tip overlies the upper superior vena cava.          ASSESSMENT:    Problem List  Date Reviewed: 9/15/2017          Codes Class Noted    Hypercalcemia ICD-10-CM: R86.12  ICD-9-CM: 275.42  9/15/2017        ILYA (acute kidney injury) [facility]) ICD-10-CM: N17.9  ICD-9-CM: 584.9  9/15/2017        Metastasis to bone of unknown primary [facility]) ICD-10-CM: C79.51, C80.1  ICD-9-CM: 198.5, 199.1  9/15/2017        Controlled type 2 diabetes mellitus without complication, without long-term current use of insulin ([facility] ICD-10-CM: E11.9  ICD-9-CM: 250.00  7/18/2017        Comprehensive diabetic foot examination, type 2 DM, encounter for [facility]) ICD-10-CM: E11.9  ICD-9-CM: 250.00  6/45/0411        Metabolic acidosis MEK-33-MW: E87.2  ICD-9-CM: 276.2  6/22/2017        Controlled type 2 diabetes mellitus with complication, without long-term current use of insulin ([facility] ICD-10-CM: E11.8  ICD-9-CM: 250.90  6/14/2017        Aortic valve disease ICD-10-CM: I35.9  ICD-9-CM: 424.1  5/22/2017        Irritable bowel syndrome with both constipation and diarrhea ICD-10-CM: K58.2  ICD-9-CM: 564.1  2/22/2017        Acquired hypothyroidism ICD-10-CM: E03.9  ICD-9-CM: 244.9  12/15/2016        Cardiac pacemaker ICD-10-CM: Z95.0  ICD-9-CM: V45.01  Unknown        Hypertension, essential ICD-10-CM: I10  ICD-9-CM: 401.9  Unknown        Hyperlipidemia ICD-10-CM: E78.5  ICD-9-CM: 272.4  Unknown        Paroxysmal atrial fibrillation (HCC) ICD-10-CM: I48.0  ICD-9-CM: 427.31  Unknown        Chronic kidney disease (CKD), stage III (moderate) ICD-10-CM: N18.3  ICD-9-CM: 076. 3  Unknown    Overview Signed 12/15/2016 10:08 AM by Kinga Garg MD     Followed by Dr. Edilberto Zapien             Vertigo, benign positional ICD-10-CM: H81.10  ICD-9-CM: 386.11  Unknown            69 y.o. F consulted for multiple bone metastases of unknown source. We discussed this is most likely malignancy although SPEP is negative but myeloma actually is usu negative for bone scan, agreed with CT C/A/P, norco prn pain, remeron for anorexia and weight loss, check tumor markers, last colonoscopy done in 2009 and will discuss whether need to repeat.  However lab returned showed hypercalcemia and ILYA, called pt back to arrange admission, need aggressive resuscitation IVF, monitor Cr and Nabeel level and add bisphophonate/calcitonin as needed, tumor marker very high for CEA/, highly suspect abd malignancy and arrange CT and biopsy once Cr improved, pain control. PLAN:  Multiple Bone Mets of unknown source  - SPEP neg, CT C/A/P, CEA and CA 19-9 elevated, highly suspect abd malignancy  9/17 Obtain CT and biopsy once Cr improves - possibly tomorrow    ILYA  - Aggressive IVF  9/16 Cr improved to 1.69 from 2.23 yesterday. Con't aggressive IVF. 9/17 Cr down to 1.34. Con't IVF. Hypercalcemia  - IVF, add Aredia/calcitonin as needed  9/16 Corrected calcium down to 12.48 from 14.26 yesterday. Con't aggressive IVF. 9/17 Ca continues to trend down. Con't IVF. Give Aredia. Electrolyte Imbalance  - Replete prn per Cyndi SOPs    Malodorous urine  9/16 Check UA, UCx.  UA with moderate leukocytes - start Rocephin and await UCx results  9/17 Preliminary UCx results with mixed skin cherise. Con't Rocephin until final report - if remains negative, will DC Rocephin. Weakness  9/17 Consult PT    Continue home meds  Cyndi SOPs  DVT prophylaxis: On Xarelto            Jessica Renee NP   [facility]  [address] [place] [address]  Office : [phone]  Fax : [phone]     Attending Addendum:  I personally evaluated the patient with Jessica Renee NALEXANDREA.,  and agree with the assessment, findings and plan as documented. Appears stable, heart regular without murmur, lungs clear, abdomen benign. Today she will receive Pamidronate.               Dior Vyas MD  [facility]  [address] [address]  Office : [phone]  Fax : [phone]

## 2017-09-17 NOTE — PROGRESS NOTES
END OF SHIFT NOTE:        Intake/Output  09/16 1901 - 09/17 0700  In: 1521 [I.V.:2753]  Out: 550 [Urine:550]   Voiding: YES  Catheter: NO  Drain:              Stool:  0 occurrences. Emesis:  0 occurrences. VITAL SIGNS  Patient Vitals for the past 12 hrs:   Temp Pulse Resp BP SpO2   09/17/17 0421 98.6 °F (37 °C) (!) 103 18 145/77 98 %   09/16/17 2352 98.4 °F (36.9 °C) 95 18 137/68 95 %   09/16/17 1939 96.9 °F (36.1 °C) 99 18 132/64 98 %       Pain Assessment  Pain 1  Pain Scale 1: Visual (09/17/17 0537)  Pain Intensity 1: 0 (09/17/17 0537)  Patient Stated Pain Goal: 0 (09/17/17 0537)  Pain Reassessment 1: Yes (09/17/17 0537)  Pain Onset 1: pta (09/17/17 0500)  Pain Location 1: Back; Chest (09/17/17 0500)  Pain Orientation 1: Lower; Lateral (09/17/17 0500)  Pain Description 1: Constant; Aching (09/17/17 0500)  Pain Intervention(s) 1: Medication (see MAR) (09/17/17 0500)    Ambulating  No    Additional Information: using [facility] [place] [facility] with 2 person assist      Shift report given to oncoming nurse at the bedside.     Jamar Doimngo

## 2017-09-17 NOTE — PROGRESS NOTES
END OF SHIFT NOTE:    Intake/Output  09/16 1901 - 09/17 0700  In: -   Out: 150 [Urine:150]   Voiding: YES  Catheter: NO  Drain:              Stool:  0 occurrences. Emesis:  0 occurrences. VITAL SIGNS  Patient Vitals for the past 12 hrs:   Temp Pulse Resp BP SpO2   09/16/17 1939 96.9 °F (36.1 °C) 99 18 132/64 98 %   09/16/17 1420 98 °F (36.7 °C) 91 18 116/48 98 %   09/16/17 1118 98.2 °F (36.8 °C) 91 18 127/56 96 %       Pain Assessment  Pain 1  Pain Scale 1: Numeric (0 - 10) (09/16/17 1230)  Pain Intensity 1: 0 (09/16/17 1230)  Patient Stated Pain Goal: 0 (09/16/17 0304)  Pain Reassessment 1: Yes (09/16/17 1230)  Pain Onset 1: pta (09/16/17 0017)  Pain Location 1: Back; Chest (09/16/17 0017)  Pain Orientation 1: Lower; Lateral (09/16/17 0017)  Pain Description 1: Aching;Constant (09/16/17 1118)  Pain Intervention(s) 1: Medication (see MAR) (09/16/17 1118)    Ambulating  Patient stands and pivots to bedside commode with one assist    Additional Information:     Shift report given to oncoming nurse at the bedside.     Lakeisha Fraser RN

## 2017-09-17 NOTE — PROGRESS NOTES
Reviewed notes of patient for spiritual concerns.       Jenelle Herrera,  Staff   C: [phone] /  [email or web address]

## 2017-09-18 NOTE — PROGRESS NOTES
Problem: Mobility Impaired (Adult and Pediatric)  Goal: *Acute Goals and Plan of Care (Insert Text)  1. Ms. Christine Candelaria will perform supine to sit and sit to supine independently in 7 days. 2. Ms. Christine Candelaria will perform sit to stand and bed to chair independently in 7 days. 3. Ms. Christine Candelaria will perform gait with least restrictive device 200 ft independently in 7 days. 4. Ms. Christine Candelaria will perform up and down 2 steps with rail independently in 7 days. 5. Ms. Christine Candelaria will perform therex to bilateral lower extremities x 25 reps in sitting in 7 days. PHYSICAL THERAPY: INITIAL ASSESSMENT 9/18/2017  INPATIENT: Hospital Day: 4  [place] / [place] / Product Type: Medicare /      NAME/AGE/GENDER: Candido He is a 69 y.o. female        PRIMARY DIAGNOSIS: Unknown Malignancy  Hypercalcemia  Acute Renal Insufficency  Hypercalcemia  ILYA (acute kidney injury) ([facility] [address]  Metastasis to bone of unknown primary ([facility] [address] <principal problem not specified> <principal problem not specified>        ICD-10: Treatment Diagnosis:       · Generalized Muscle Weakness (M62.81)  · Difficulty in walking, Not elsewhere classified (R26.2)   Precaution/Allergies:  Review of patient's allergies indicates no known allergies. ASSESSMENT:      Ms. Christine Candelaria presents with decreased mobility and decreased gait. At baseline she is independent with all mobility and gait. \"I was doing everything for myself before I came to the hospital.\"  She is SOB on RA. When asked her about it she states she has had issues since she had a ROSS procedure back in the 90s for a murmur. Checked her O2 just to make sure and it was 95%. Her nurse Delma Comfort also states they have been giving her fluid due to kidney function and not able to give lasix yet so her has more fluid on board which could also me causing the increased SOB.   Ms. Christine Candelaria currently is requiring mod assist for supine to sit and min assist for bed to chair.  Contact guard for gait very short distance. Ms. Dulce Hinojosa may benefit from a post acute stay at time of discharge. She states \"I wont be able to go home like this. \"  Likely will depend on progress and length of stay. There are a lot of unknowns right now. This section established at most recent assessment   PROBLEM LIST (Impairments causing functional limitations):  1. Decreased Strength  2. Decreased Transfer Abilities  3. Decreased Ambulation Ability/Technique  4. Decreased Activity Tolerance    INTERVENTIONS PLANNED: (Benefits and precautions of physical therapy have been discussed with the patient.)  1. Bed Mobility  2. Gait Training  3. Therapeutic Activites  4. Therapeutic Exercise/Strengthening  5. Transfer Training      TREATMENT PLAN: Frequency/Duration: 3 times a week for duration of hospital stay  Rehabilitation Potential For Stated Goals: GOOD      RECOMMENDED REHABILITATION/EQUIPMENT: (at time of discharge pending progress): Due to the probability of continued deficits (see above) this patient will likely need continued skilled physical therapy after discharge. Equipment:   · Walkers, Type: Rolling Walker                   HISTORY:   History of Present Injury/Illness (Reason for Referral):  Ms. Dulce Hinojosa is a 69 y.o. white female with a history of aortic valve disease s/p  Ross procedure in 1997 with complete AV block post-procedure and pacemaker implantation, paroxysmal atrial fibrillation, stage III chronic kidney disease, type II diabetes, metabolic acidosis, IBS, hypothyroidism s/p total thyroidectomy in 1986, hypertension, hyperlipidemia, vertigo, chronic back pain, GERD, hepatitis A, hysterectomy in 1986, morbid obesity, anemia, osteoarthritis of left knee s/p total knee replacement on 2/28/11, benign left breast lumpectomy and tonsillectomy. She initially presented to PCP on 7/10/2017 reporting a one week history of cold and cough.  She stated she had been coughing so much that she had developed rib and back pain. Review of systems and physical exam was consistent with acute bronchitis. She was prescribed Hycodan and inhaled steroids and sent for chest x-ray to rule out atypical pneumonia. X-ray, completed on 7/12/17, was within normal limits. Patient was seen urgently by her cardiologist, Dr. Sabine Ozuna on 7/19/17 reporting back pain radiating around to her chest worsening with deep breathing, coughing and turning as well as shortness of breath. Echo completed on 8/4/17 showed EF >55%, and cardiac work-up determined that symptoms did not appear to be true cardiac related. Patient requested referral to pulmonology. Repeat chest x-ray was performed on 8/14/17 as well as a nuclear medicine lung ventilation perfusion scan. Chest x-ray showed no acute cardiopulmonary process, and pulmonary perfusion scan was normal with no evidence of acute pulmonary embolism. Of note, patient also underwent bilateral diagnostic screening mammogram on 8/14/17 which showed no suspicious finding in either breast.       Patient was also seen by her nephrologist, Dr. Robert Gallardo in August of 2017 who ordered bone scan was completed on 8/30/17 demonstrating multifocal radiotracer activity concerning for metastatic disease. CT imaging of the chest, abdomen and pelvis was recommended for further assessment to identify potential primary neoplasm. Upon receiving results of bone scan, patient was referred to [facility] for oncology evaluation. CT scan of the chest, abdomen and pelvis are scheduled to be performed on 9/15/17. Past Medical History/Comorbidities:   Ms. Reagan Gil  has a past medical history of Aortic stenosis; AV block, 3rd degree (HCC) (2/13/2016); Cardiac pacemaker; Chest pain; Chronic kidney disease (CKD), stage III (moderate); Chronic pain; Diabetes mellitus, type II ([address] GERD (gastroesophageal reflux disease); Hepatitis A; Hyperlipidemia; Hypertension;  Hypertension, essential; hypothyroidism; IBS (irritable bowel syndrome); Morbid obesity ([facility] [address] Osteoarthritis of left knee (2/28/2011); Paroxysmal atrial fibrillation ([facility] [address] S/P total knee replacement using cement (2/28/2011); S/P total knee replacement using cement (2/28/2011); and Vertigo, benign positional.  Ms. Marilyn Clements  has a past surgical history that includes hysterectomy (1986); thyroidectomy (1986); breast lumpectomy (1993); tonsillectomy (as a child); other surgical (1996); cardiac surg procedure unlist (1997); pacemaker (1997 & 2008); and orthopaedic (2/2011). Social History/Living Environment:   Home Environment: Private residence  # Steps to Enter: 1  One/Two Story Residence: Two story, live on 1st floor  Living Alone: No  Support Systems: Spouse/Significant Other/Partner  Patient Expects to be Discharged to[de-identified] Unknown  Current DME Used/Available at Home: Cane, straight  Prior Level of Function/Work/Activity:  independent  Obese, chronic pain,    Number of Personal Factors/Comorbidities that affect the Plan of Care: 1-2: MODERATE COMPLEXITY   EXAMINATION:   Most Recent Physical Functioning:   Gross Assessment:  AROM: Generally decreased, functional  Strength: Generally decreased, functional               Posture:  Posture (WDL): Within defined limits  Balance:  Sitting: Intact  Standing: Impaired; With support  Standing - Static: Fair  Standing - Dynamic : Fair Bed Mobility:  Rolling: Moderate assistance  Supine to Sit: Moderate assistance  Scooting: Minimum assistance  Wheelchair Mobility:     Transfers:  Sit to Stand: Contact guard assistance;Minimum assistance  Stand to Sit: Contact guard assistance  Gait:     Step Length: Right shortened;Left shortened  Distance (ft): 4 Feet (ft)  Assistive Device: Walker, rolling  Ambulation - Level of Assistance: Contact guard assistance  Interventions: Verbal cues; Tactile cues; Safety awareness training;Manual cues       Body Structures Involved:  1. Muscles Body Functions Affected:  1.  Movement Related Activities and Participation Affected:  1. Mobility   Number of elements that affect the Plan of Care: 3: MODERATE COMPLEXITY   CLINICAL PRESENTATION:   Presentation: Evolving clinical presentation with changing clinical characteristics: MODERATE COMPLEXITY   CLINICAL DECISION MAKIN [facility] Inpatient Short Form  How much difficulty does the patient currently have. .. Unable A Lot A Little None   1. Turning over in bed (including adjusting bedclothes, sheets and blankets)? [ ] 1   [ ] 2   [X] 3   [ ] 4   2. Sitting down on and standing up from a chair with arms ( e.g., wheelchair, bedside commode, etc.)   [ ] 1   [ ] 2   [X] 3   [ ] 4   3. Moving from lying on back to sitting on the side of the bed? [ ] 1   [X] 2   [ ] 3   [ ] 4   How much help from another person does the patient currently need. .. Total A Lot A Little None   4. Moving to and from a bed to a chair (including a wheelchair)? [ ] 1   [ ] 2   [X] 3   [ ] 4   5. Need to walk in hospital room? [ ] 1   [X] 2   [ ] 3   [ ] 4   6. Climbing 3-5 steps with a railing? [ ] 1   [X] 2   [ ] 3   [ ] 4   © , Trustees of [address] [place] [address] [address] [id number], under license to [employer]. All rights reserved    Score:  Initial: 15 Most Recent: X (Date: -- )     Interpretation of Tool:  Represents activities that are increasingly more difficult (i.e. Bed mobility, Transfers, Gait).        Score 24 23 22-20 19-15 14-10 9-7 6       Modifier CH CI CJ CK CL CM CN         · Mobility - Walking and Moving Around:               - CURRENT STATUS:    CK - 40%-59% impaired, limited or restricted               - GOAL STATUS:           CK - 40%-59% impaired, limited or restricted               - D/C STATUS:                       ---------------To be determined---------------  [place] / Product Type: Medicare /       Medical Necessity:     · Patient is expected to demonstrate progress in functional technique to increase independence with mobility and gait. .  Reason for Services/Other Comments:  · Patient continues to require present interventions due to patient's inability to function at baseline. Use of outcome tool(s) and clinical judgement create a POC that gives a: Questionable prediction of patient's progress: MODERATE COMPLEXITY                 TREATMENT:   (In addition to Assessment/Re-Assessment sessions the following treatments were rendered)   Pre-treatment Symptoms/Complaints:  \"this bed is awful\"  Pain: Initial:   Pain Intensity 1: 8 (RN Marin Bentley in the room giving pain medicine)  Pain Location 1: Back  Pain Intervention(s) 1: Emotional support  Post Session:  [id number] Florencia Cotton being up in the chair. Assessment/Reassessment only, no treatment provided today     Braces/Orthotics/Lines/Etc:   · IV  · O2 Device: Room air  Treatment/Session Assessment:    · Response to Treatment:  good  · Interdisciplinary Collaboration:  · Registered Nurse  · After treatment position/precautions:  · Up in chair  · Call light within reach  · RN notified  · Compliance with Program/Exercises: Will assess as treatment progresses. · Recommendations/Intent for next treatment session: \"Next visit will focus on advancements to more challenging activities and reduction in assistance provided\".   Total Treatment Duration:  PT Patient Time In/Time Out  Time In: 0910  Time Out: 0930     Padmini Jordan, PT

## 2017-09-18 NOTE — PROGRESS NOTES
[facility]        Inpatient Hematology / Oncology Progress Note      Admission Date: 9/15/2017 10:20 AM  Reason for Admission/Hospital Course: Unknown Malignancy  Hypercalcemia  Acute Renal Insufficency  Hypercalcemia  ILYA (acute kidney injury) ([address]  Metastasis to bone of unknown primary (HCC)      24 Hour Events:  Kidney fx continues to improve - Cr down to 1.20 which appears to be patient's baseline  Corrected Ca+ 12.66 - rec'd Aredia yesterday, start Calcitonin  CT CAP today  Appears more SOB today, most likely r/t fluid overload. Also has gained 12lbs since yesterday. Will give lasix x 1. ROS:  Constitutional: Negative for fever, chills, weakness, malaise, fatigue. CV: Negative for chest pain, palpitations, edema. Respiratory: +dyspnea. Negative for cough, wheezing. GI: Negative for nausea, abdominal pain, diarrhea. 10 point review of systems is otherwise negative with the exception of the elements mentioned above in the HPI. No Known Allergies    OBJECTIVE:  Patient Vitals for the past 8 hrs:   BP Temp Pulse Resp SpO2 Weight   17 0933 - - - - 95 % -   17 0759 134/64 97.3 °F (36.3 °C) 93 20 98 % -   17 0340 156/75 97.6 °F (36.4 °C) (!) 109 20 95 % -   17 0209 - - - - - 213 lb 9.6 oz (96.9 kg)     Temp (24hrs), Av.8 °F (36.6 °C), Min:97.3 °F (36.3 °C), Max:98.6 °F (37 °C)     07 -  1900  In: 240 [P.O.:240]  Out: -     Physical Exam:  Constitutional: Well developed, well nourished female in no acute distress, sitting comfortably in the bedside chair. HEENT: Normocephalic and atraumatic. Oropharynx is clear, mucous membranes are moist.  Extraocular muscles are intact. Sclerae anicteric. Neck supple without JVD. No thyromegaly present. Lymph node   Deferred   Skin Warm and dry. No bruising and no rash noted. No erythema. No pallor.     Respiratory Lungs are clear to auscultation bilaterally without wheezes, rales or rhonchi, normal air exchange without accessory muscle use. CVS Normal rate, regular rhythm and normal S1 and S2. No murmurs, gallops, or rubs. Abdomen Soft, nontender and nondistended, normoactive bowel sounds. No palpable mass. No hepatosplenomegaly. Neuro Grossly nonfocal with no obvious sensory or motor deficits. MSK Normal range of motion in general.  No tenderness. +Generalized edema with 2+ BLE edema   Psych Appropriate mood and affect. Labs:      Recent Labs      09/18/17   0341  09/15/17   1117   WBC  6.1  8.3   RBC  3.35*  3.98*   HGB  10.3*  12.3   HCT  30.8*  36.2   MCV  91.9  91.0   MCH  30.7  30.9   MCHC  33.4  34.0   RDW  15.7*  15.1*   PLT  98*  150   GRANS  62  66   LYMPH  22  23   MONOS  13*  10   EOS  3  1   BASOS  0  0   IG  0.5  0.4   DF  AUTOMATED  AUTOMATED   ANEU  3.8  5.5   ABL  1.3  1.9   ABM  0.8  0.8   FRANKO  0.2  0.1   ABB  0.0  0.0   AIG  0.0  0.0        Recent Labs      09/18/17   0341  09/17/17   1020  09/16/17   0304  09/15/17   1117   NA  141  141  140  136   K  4.1  4.0  4.1  4.1   CL  113*  112*  110*  105   CO2  19*  18*  19*  20*   AGAP  9  11  11  11   GLU  100  178*  73  80   BUN  19  24*  39*  47*   CREA  1.20*  1.34*  1.69*  2.23*   GFRAA  56*  49*  38*  27*   GFRNA  46*  41*  31*  23*   CA  11.7*  11.5*  11.6*  14.1*   SGOT  122*  116*  99*  112*   AP  236*  226*  206*  230*   TP  6.4  6.4  6.8  8.1   ALB  2.8*  2.9*  2.9*  3.8   GLOB  3.6*  3.5  3.9*  4.3*   AGRAT  0.8*  0.8*  0.7*  0.9*   MG  1.5*   --   1.5*  1.8         Imaging:  XR CHEST SNGL V [[id number]] Collected: 09/15/17 1506      Order Status: Completed Updated: 09/15/17 1509     Narrative:       Single portable upright chest x-ray September 15, 2017    Reference exam: August 14, 2017    INDICATION: PICC line placement    FINDINGS: Right-sided electronic device with median sternotomy clips and wires  are seen with lungs underinflated.  There is minimal stranding blurring the  vascular borders, left-sided PICC line tip overlies the upper superior vena cava  at the azygos level.       Impression:       IMPRESSION: Catheter tip overlies the upper superior vena cava. ASSESSMENT:    Problem List  Date Reviewed: 9/15/2017          Codes Class Noted    Hypercalcemia ICD-10-CM: E83.52  ICD-9-CM: 275.42  9/15/2017        ILYA (acute kidney injury) [facility]) ICD-10-CM: N17.9  ICD-9-CM: 584.9  9/15/2017        Metastasis to bone of unknown primary [facility]) ICD-10-CM: C79.51, C80.1  ICD-9-CM: 198.5, 199.1  9/15/2017        Controlled type 2 diabetes mellitus without complication, without long-term current use of insulin ([facility] ICD-10-CM: E11.9  ICD-9-CM: 250.00  7/18/2017        Comprehensive diabetic foot examination, type 2 DM, encounter for [facility]) ICD-10-CM: E11.9  ICD-9-CM: 250.00  6/06/5329        Metabolic acidosis [id number]: E87.2  ICD-9-CM: 276.2  6/22/2017        Controlled type 2 diabetes mellitus with complication, without long-term current use of insulin ([facility] ICD-10-CM: E11.8  ICD-9-CM: 250.90  6/14/2017        Aortic valve disease ICD-10-CM: I35.9  ICD-9-CM: 424.1  5/22/2017        Irritable bowel syndrome with both constipation and diarrhea ICD-10-CM: K58.2  ICD-9-CM: 564.1  2/22/2017        Acquired hypothyroidism ICD-10-CM: E03.9  ICD-9-CM: 244.9  12/15/2016        Cardiac pacemaker ICD-10-CM: Z95.0  ICD-9-CM: V45.01  Unknown        Hypertension, essential ICD-10-CM: I10  ICD-9-CM: 401.9  Unknown        Hyperlipidemia ICD-10-CM: E78.5  ICD-9-CM: 272.4  Unknown        Paroxysmal atrial fibrillation ([facility] ICD-10-CM: I48.0  ICD-9-CM: 427.31  Unknown        Chronic kidney disease (CKD), stage III (moderate) ICD-10-CM: N18.3  ICD-9-CM: 618. 3  Unknown    Overview Signed 12/15/2016 10:08 AM by Luis Bellamy MD     Followed by Dr. Crow Fearing             Vertigo, benign positional ICD-10-CM: H81.10  ICD-9-CM: 386.11  Unknown            69 y.o. F consulted for multiple bone metastases of unknown source.  We discussed this is most likely malignancy although SPEP is negative but myeloma actually is usu negative for bone scan, agreed with CT C/A/P, norco prn pain, remeron for anorexia and weight loss, check tumor markers, last colonoscopy done in 2009 and will discuss whether need to repeat. However lab returned showed hypercalcemia and ILYA, called pt back to arrange admission, need aggressive resuscitation IVF, monitor Cr and Nabeel level and add bisphophonate/calcitonin as needed, tumor marker very high for CEA/, highly suspect abd malignancy and arrange CT and biopsy once Cr improved, pain control. PLAN:  Multiple Bone Mets of unknown source  - SPEP neg, CT C/A/P, CEA and CA 19-9 elevated, highly suspect abd malignancy  9/17 Obtain CT and biopsy once Cr improves - possibly tomorrow  9/18 Cr improved to patient's baseline. Get CT C/A/P today. Acute on CKD  - Aggressive IVF  9/16 Cr improved to 1.69 from 2.23 yesterday. Con't aggressive IVF. 9/17 Cr down to 1.34. Con't IVF. 9/18 Cr continues to improve. Cr down to 1.20 which appears to be around patient's baseline. Hypercalcemia  - IVF, add Aredia/calcitonin as needed  9/16 Corrected calcium down to 12.48 from 14.26 yesterday. Con't aggressive IVF. 9/17 Ca continues to trend down. Con't IVF. Give Aredia. 9/18 Rosey Ca 12.66, slightly up today. Aredia given yesterday. Start Calcitonin today. Electrolyte Imbalance  - Replete prn per Cyndi SOPs    Malodorous urine  9/16 Check UA, UCx.  UA with moderate leukocytes - start Rocephin and await UCx results  9/17 Preliminary UCx results with mixed skin cherise. Con't Rocephin until final report - if remains negative, will DC Rocephin. 9/18 UCx with mixed skin cherise. DC Rocephin. Weakness  9/17 Consult PT    Dyspnea/Fluid overload  9/18 Pt appears more SOB today most likely r/t to fluid overload. Has been receiving aggressive IVF. Also 12# wt gain noted from yesterday. Give lasix x 1.     Continue home meds  Cyndi SOPs  DVT prophylaxis: On Monica Yadav 45, NP   [facility]  [address] [address]  Office : [phone]  Fax : [phone]         Attending Addendum:  I personally evaluated the patient with Bruce Acuna N.P.,  and agree with the assessment, findings and plan as documented. Appears stable, imaging studies are consistent with metastatic lung cancer and cirrhosis.               Mariya Castillo MD    [id number] [place]  Office : [phone]  Fax : [phone]

## 2017-09-18 NOTE — PROGRESS NOTES
END OF SHIFT NOTE:    Intake/Output  09/18 0701 - 09/18 1900  In: 240 [P.O.:240]  Out: 500 [Urine:500]   Voiding: YES  Catheter: NO  Drain:              Stool:  1 occurrences. Stool Assessment  Stool Color: Brown (09/18/17 1608)  Stool Appearance: Loose (09/18/17 1608)  Stool Amount: Small (09/18/17 1608)  Stool Source/Status: Rectum (09/18/17 1608)    Emesis:  0 occurrences. VITAL SIGNS  Patient Vitals for the past 12 hrs:   Temp Pulse Resp BP SpO2   09/18/17 1607 97.4 °F (36.3 °C) 100 20 135/66 94 %   09/18/17 1100 97.7 °F (36.5 °C) (!) 101 20 143/65 98 %   09/18/17 0933 - - - - 95 %   09/18/17 0759 97.3 °F (36.3 °C) 93 20 134/64 98 %       Pain Assessment  Pain 1  Pain Scale 1: Numeric (0 - 10) (09/18/17 1806)  Pain Intensity 1: 5 (09/18/17 1806)  Patient Stated Pain Goal: 0 (09/18/17 0346)  Pain Reassessment 1: Yes (09/18/17 1445)  Pain Onset 1: PTA (09/18/17 0346)  Pain Location 1: Back (09/18/17 1806)  Pain Orientation 1: Upper (09/18/17 1806)  Pain Description 1: Aching (09/18/17 1806)  Pain Intervention(s) 1: Medication (see MAR) (09/18/17 1806)    Ambulating  Yes    Additional Information: worked with PT. Constipation resolved. CT showed lung mass- NPs aware. Some confusion today due to pain meds given. Shift report will be given to oncoming nurse at the bedside.     Chris King RN

## 2017-09-18 NOTE — PROGRESS NOTES
End of Shift Note: 7p ~ 7a    Poor pain control this shift. Refused Morphine due to S/E feeling confused, however after Norco PO this am and no relief, pt took 1mg Morphine IV. This appeared to help with pt's pain. IVF's continue at 200cc/hr, crackles noted to bases bilaterally, mild increase in SOB apparent. 24 I&O (+3445)   Weight up 5.4kgs in 24 hours. Continuing with current POC.     Report to Gaby Wu RN

## 2017-09-19 NOTE — CONSULTS
PM&R Rehab Consult    Subjective:     Date of Consultation:  September 19, 2017    Referring Physician: Dr. Hiram Redmond    Patient is a 69 y.o. female who is being seen for rehab recommendations associated with stg IV lung CA  Active Problems:    Hypercalcemia (9/15/2017)      ILYA (acute kidney injury) ([facility] (9/15/2017)      Metastasis to bone of unknown primary ([facility] (9/15/2017)     PER HPI \"Ms. Eduard Lin is a 69 y.o. white female with a history of aortic valve disease s/p  Ross procedure in 1997 with complete AV block post-procedure and pacemaker implantation, paroxysmal atrial fibrillation, stage III chronic kidney disease, type II diabetes, metabolic acidosis, IBS, hypothyroidism s/p total thyroidectomy in 1986, hypertension, hyperlipidemia, vertigo, chronic back pain, GERD, hepatitis A, hysterectomy in 1986, morbid obesity, anemia, osteoarthritis of left knee s/p total knee replacement on 2/28/11, benign left breast lumpectomy and tonsillectomy. She initially presented to PCP on 7/10/2017 reporting a one week history of cold and cough. She stated she had been coughing so much that she had developed rib and back pain. Review of systems and physical exam was consistent with acute bronchitis. She was prescribed Hycodan and inhaled steroids and sent for chest x-ray to rule out atypical pneumonia. X-ray, completed on 7/12/17, was within normal limits.     Patient was seen urgently by her cardiologist, Dr. Lane Pratt on 7/19/17 reporting back pain radiating around to her chest worsening with deep breathing, coughing and turning as well as shortness of breath. Echo completed on 8/4/17 showed EF >55%, and cardiac work-up determined that symptoms did not appear to be true cardiac related. Patient requested referral to pulmonology. Repeat chest x-ray was performed on 8/14/17 as well as a nuclear medicine lung ventilation perfusion scan.  Chest x-ray showed no acute cardiopulmonary process, and pulmonary perfusion scan was normal with no evidence of acute pulmonary embolism. Of note, patient also underwent bilateral diagnostic screening mammogram on 8/14/17 which showed no suspicious finding in either breast.     Patient was also seen by her nephrologist, Dr. Reanna Smith in August of 2017 who ordered bone scan was completed on 8/30/17 demonstrating multifocal radiotracer activity concerning for metastatic disease. CT imaging of the chest, abdomen and pelvis was recommended for further assessment to identify potential primary neoplasm. \"    Work up has revealed likely primary lung CA with associated pathologic rib fxs, ischial fx , thoracolumbar vertebral mets and mediastinal LAD. She was noted to have ILYA with hypercalcemia, thus she was admitted for further work up. The patient reports that she started coughing and having pleuritic chest pain in Feb 2017. She also reports unintentional wt loss. Currently, she reports SOB, LOPEZ and overall diffuse weakness and fatigue. Currently, she is min/mod A with bed mobility, CGA/min A STS, amb 4ft CGA RW. I do not see an OT eval. pts renal fxn is improving but appears fluid overloaded now. She is receiving Lasix.     Past Medical History:   Diagnosis Date    Aortic stenosis     AVR 1997    AV block, 3rd degree (HCC) 2/13/2016    Cardiac pacemaker     Chest pain     Chronic kidney disease (CKD), stage III (moderate)     Chronic pain      lower back    Diabetes mellitus, type II (HCC)     GERD (gastroesophageal reflux disease)     controlled with medication    Hepatitis A     dx after hysterectomy 1986 - pt states no problems now    Hyperlipidemia     Hypertension     controlled with medication    Hypertension, essential     hypothyroidism     hx of total thyroidectomy in 1986    IBS (irritable bowel syndrome)     pt states has hx of diverticulitis     Morbid obesity ([facility]     bmi=40    Osteoarthritis of left knee 2/28/2011    Paroxysmal atrial fibrillation (HCC)     S/P total knee replacement using cement 2/28/2011    S/P total knee replacement using cement 2/28/2011    Vertigo, benign positional       Family History   Problem Relation Age of Onset    Cancer Mother      breast    Breast Cancer Mother     Heart Attack Father     Heart Failure Father     Kidney Disease Father     Cancer Maternal Aunt      Breast CA    Breast Cancer Maternal Aunt     Cancer Maternal Grandmother      Breast CA    Breast Cancer Maternal Grandmother     Heart Disease Maternal Aunt     Malignant Hyperthermia Neg Hx     Pseudocholinesterase Deficiency Neg Hx     Delayed Awakening Neg Hx     Post-op Nausea/Vomiting Neg Hx     Emergence Delirium Neg Hx     Post-op Cognitive Dysfunction Neg Hx     Other Neg Hx       Social History   Substance Use Topics    Smoking status: Never Smoker    Smokeless tobacco: Never Used    Alcohol use No   Home Environment: Private residence  # Steps to Enter: 1  One/Two Story Residence: Two story, live on 1st floor  Living Alone: No  Support Systems: Spouse/Significant Other/Partner; its just her and her  who is on PD and has diabetic peripheral neuropathy. She considers herself his caregiver. Although, it sounds as if he is self sufficient.    Patient Expects to be Discharged to[de-identified] Unknown  Current DME Used/Available at Home: [employer] Greer  Prior Level of Function/Work/Activity:  independent  Past Surgical History:   Procedure Laterality Date    CARDIAC SURG PROCEDURE UNLIST  1997    mitral valve replacement \"Ross Procedure\" per pt    HX BREAST LUMPECTOMY  1993    left breast - benign per pt    HX HYSTERECTOMY  1986    HX ORTHOPAEDIC  2/2011    left knee replacement    HX OTHER SURGICAL  1996    basal cell carcinoma removed from face   [facility] & 2008    pacemaker placed in 1997 due to \"complete heart block\" after mitral valve replacement - 100 % dependant on pacemaker    HX TONSILLECTOMY  as a child   [address]    total Prior to Admission medications    Medication Sig Start Date End Date Taking? Authorizing Provider   HYDROcodone-acetaminophen (NORCO) 5-325 mg per tablet Take 1 Tab by mouth every six (6) hours as needed for Pain. Max Daily Amount: 4 Tabs. 9/14/17  Yes Carly Fregoso MD   mirtazapine (REMERON) 7.5 mg tablet Take 1 Tab by mouth nightly. 9/14/17  Yes Carly Fregoso MD   cyclobenzaprine (FLEXERIL) 5 mg tablet Take 5 mg by mouth. Yes Historical Provider   valsartan-hydroCHLOROthiazide (DIOVAN-HCT) 320-25 mg per tablet Take 1 Tab by mouth daily. 6/22/17  Yes Gee John MD   rivaroxaban (XARELTO) 20 mg tab tablet Take 1 Tab by mouth daily (with dinner). 6/5/17  Yes Marisol Casey MD   azelastine (ASTELIN) 137 mcg (0.1 %) nasal spray 1 [place] by Both Nostrils route two (2) times a day. Use in each nostril as directed 6/1/17  Yes Gee John MD   B.infantis-B.ani-B.long-B.bifi (PROBIOTIC 4X) 10-15 mg TbEC Take  by mouth daily. Yes Historical Provider   amLODIPine (NORVASC) 5 mg tablet Take 1 Tab by mouth daily. Patient taking differently: Take 2.5 mg by mouth daily. 2/22/17  Yes Gee John MD   SYNTHROID 125 mcg tablet Take 1 Tab by mouth Daily (before breakfast). MARCIO 12/16/16  Yes Gee John MD   glyBURIDE (DIABETA) 2.5 mg tablet Take 1.25 mg by mouth two (2) times daily (with meals). 9/22/16 9/22/17 Yes Historical Provider   metFORMIN (GLUMETZA ER) 500 mg TG24 24 hour tablet Take  by mouth daily. Yes Historical Provider   vitamin B complex (VITAMINS B COMPLEX) capsule Strength: Vitamin B Complex; Form: capsule; SIG: take 1 cap(s) orally twice a day 7/1/15  Yes Historical Provider   multivitamin (ONE A DAY) tablet Strength: Multiple Vitamins; Form: capsule; SIG: take 1 cap(s) orally once a day 7/1/15  Yes Historical Provider   omeprazole (PRILOSEC) 20 mg capsule Take 20 mg by mouth daily.      Yes Historical Provider   omega-3 fatty acids-vitamin e (FISH OIL) 1,000 mg Cap Take 1 Cap by mouth every morning. Stopped 4/15/12 2/7/11  Yes Historical Provider   Calcium Carbonate-Vit D3-Min (CALCIUM-VITAMIN D) 600-400 mg-unit Tab Take 2 Tabs by mouth daily. 11  Yes Historical Provider     No Known Allergies     Review of Systems:  A comprehensive review of systems was negative except for that written in the HPI. Objective:     Vitals:  Blood pressure 154/76, pulse 76, temperature 97.8 °F (36.6 °C), resp. rate 20, weight 221 lb 12.8 oz (100.6 kg), SpO2 98 %. Temp (24hrs), Av.8 °F (36.6 °C), Min:97.4 °F (36.3 °C), Max:98.3 °F (36.8 °C)      Intake and Output:   1901 -  0700  In: 7227 [P.O.:720; I.V.:5711]  Out: 1500 [Urine:1500]    Physical Exam:  General:  Alert, oriented and mood affect appropriate; a bit confused in conversation and recollection of events. SOB with conversation with deep non productive cough   Lungs:   Coarse bs to  auscultation bilaterally. Heart:  Regular rate and rhythm, S1, S2 stable, no murmur, click, rub or gallop. Abdomen:   Soft, non-tender. Bowel sounds present. No masses,  No organomegaly. obese   Genitourinary: deferred   Neuro Muscular: Generalized non focal weakness. prox LE strength 3/5, 3+ edema peripherally  Sensation intact. Skin:  No rashes, lesions, or signs/symptoms or infection.          Labs/Studies:Recent Results (from the past 72 hour(s))   GLUCOSE, POC    Collection Time: 17  5:23 PM   Result Value Ref Range    Glucose (POC) 95 65 - 100 mg/dL   GLUCOSE, POC    Collection Time: 17  7:37 AM   Result Value Ref Range    Glucose (POC) 113 (H) 65 - 316 mg/dL   METABOLIC PANEL, COMPREHENSIVE    Collection Time: 17 10:20 AM   Result Value Ref Range    Sodium 141 136 - 145 mmol/L    Potassium 4.0 3.5 - 5.1 mmol/L    Chloride 112 (H) 98 - 107 mmol/L    CO2 18 (L) 21 - 32 mmol/L    Anion gap 11 7 - 16 mmol/L    Glucose 178 (H) 65 - 100 mg/dL    BUN 24 (H) 8 - 23 MG/DL    Creatinine 1.34 (H) 0.6 - 1.0 MG/DL    GFR est AA 49 (L) >60 ml/min/1.73m2    GFR est non-AA 41 (L) >60 ml/min/1.73m2    Calcium 11.5 (H) 8.3 - 10.4 MG/DL    Bilirubin, total 0.6 0.2 - 1.1 MG/DL    ALT (SGPT) 46 12 - 65 U/L    AST (SGOT) 116 (H) 15 - 37 U/L    Alk. phosphatase 226 (H) 50 - 136 U/L    Protein, total 6.4 6.3 - 8.2 g/dL    Albumin 2.9 (L) 3.2 - 4.6 g/dL    Globulin 3.5 2.3 - 3.5 g/dL    A-G Ratio 0.8 (L) 1.2 - 3.5     GLUCOSE, POC    Collection Time: 09/17/17 11:21 AM   Result Value Ref Range    Glucose (POC) 150 (H) 65 - 100 mg/dL   GLUCOSE, POC    Collection Time: 09/17/17  4:48 PM   Result Value Ref Range    Glucose (POC) 129 (H) 65 - 100 mg/dL   GLUCOSE, POC    Collection Time: 09/17/17  9:32 PM   Result Value Ref Range    Glucose (POC) 81 65 - 148 mg/dL   METABOLIC PANEL, COMPREHENSIVE    Collection Time: 09/18/17  3:41 AM   Result Value Ref Range    Sodium 141 136 - 145 mmol/L    Potassium 4.1 3.5 - 5.1 mmol/L    Chloride 113 (H) 98 - 107 mmol/L    CO2 19 (L) 21 - 32 mmol/L    Anion gap 9 7 - 16 mmol/L    Glucose 100 65 - 100 mg/dL    BUN 19 8 - 23 MG/DL    Creatinine 1.20 (H) 0.6 - 1.0 MG/DL    GFR est AA 56 (L) >60 ml/min/1.73m2    GFR est non-AA 46 (L) >60 ml/min/1.73m2    Calcium 11.7 (H) 8.3 - 10.4 MG/DL    Bilirubin, total 0.6 0.2 - 1.1 MG/DL    ALT (SGPT) 53 12 - 65 U/L    AST (SGOT) 122 (H) 15 - 37 U/L    Alk.  phosphatase 236 (H) 50 - 136 U/L    Protein, total 6.4 6.3 - 8.2 g/dL    Albumin 2.8 (L) 3.2 - 4.6 g/dL    Globulin 3.6 (H) 2.3 - 3.5 g/dL    A-G Ratio 0.8 (L) 1.2 - 3.5     MAGNESIUM    Collection Time: 09/18/17  3:41 AM   Result Value Ref Range    Magnesium 1.5 (L) 1.8 - 2.4 mg/dL   CBC WITH AUTOMATED DIFF    Collection Time: 09/18/17  3:41 AM   Result Value Ref Range    WBC 6.1 4.3 - 11.1 K/uL    RBC 3.35 (L) 4.05 - 5.25 M/uL    HGB 10.3 (L) 11.7 - 15.4 g/dL    HCT 30.8 (L) 35.8 - 46.3 %    MCV 91.9 79.6 - 97.8 FL    MCH 30.7 26.1 - 32.9 PG    MCHC 33.4 31.4 - 35.0 g/dL    RDW 15.7 (H) 11.9 - 14.6 %    PLATELET 98 (L) 099 - 450 K/uL    MPV 10.5 (L) 10.8 - 14.1 FL    DF AUTOMATED      NEUTROPHILS 62 43 - 78 %    LYMPHOCYTES 22 13 - 44 %    MONOCYTES 13 (H) 4.0 - 12.0 %    EOSINOPHILS 3 0.5 - 7.8 %    BASOPHILS 0 0.0 - 2.0 %    IMMATURE GRANULOCYTES 0.5 0.0 - 5.0 %    ABS. NEUTROPHILS 3.8 1.7 - 8.2 K/UL    ABS. LYMPHOCYTES 1.3 0.5 - 4.6 K/UL    ABS. MONOCYTES 0.8 0.1 - 1.3 K/UL    ABS. EOSINOPHILS 0.2 0.0 - 0.8 K/UL    ABS. BASOPHILS 0.0 0.0 - 0.2 K/UL    ABS. IMM. GRANS. 0.0 0.0 - 0.5 K/UL   GLUCOSE, POC    Collection Time: 09/18/17  7:46 AM   Result Value Ref Range    Glucose (POC) 97 65 - 100 mg/dL   GLUCOSE, POC    Collection Time: 09/18/17  4:24 PM   Result Value Ref Range    Glucose (POC) 109 (H) 65 - 100 mg/dL   GLUCOSE, POC    Collection Time: 09/18/17  9:17 PM   Result Value Ref Range    Glucose (POC) 96 65 - 790 mg/dL   METABOLIC PANEL, COMPREHENSIVE    Collection Time: 09/19/17  4:07 AM   Result Value Ref Range    Sodium 143 136 - 145 mmol/L    Potassium 4.2 3.5 - 5.1 mmol/L    Chloride 114 (H) 98 - 107 mmol/L    CO2 18 (L) 21 - 32 mmol/L    Anion gap 11 7 - 16 mmol/L    Glucose 75 65 - 100 mg/dL    BUN 18 8 - 23 MG/DL    Creatinine 1.19 (H) 0.6 - 1.0 MG/DL    GFR est AA 57 (L) >60 ml/min/1.73m2    GFR est non-AA 47 (L) >60 ml/min/1.73m2    Calcium 10.0 8.3 - 10.4 MG/DL    Bilirubin, total 0.5 0.2 - 1.1 MG/DL    ALT (SGPT) 48 12 - 65 U/L    AST (SGOT) 105 (H) 15 - 37 U/L    Alk.  phosphatase 203 (H) 50 - 136 U/L    Protein, total 5.9 (L) 6.3 - 8.2 g/dL    Albumin 2.6 (L) 3.2 - 4.6 g/dL    Globulin 3.3 2.3 - 3.5 g/dL    A-G Ratio 0.8 (L) 1.2 - 3.5     MAGNESIUM    Collection Time: 09/19/17  4:07 AM   Result Value Ref Range    Magnesium 2.0 1.8 - 2.4 mg/dL   CBC WITH AUTOMATED DIFF    Collection Time: 09/19/17  4:07 AM   Result Value Ref Range    WBC 5.2 4.3 - 11.1 K/uL    RBC 3.16 (L) 4.05 - 5.25 M/uL    HGB 9.7 (L) 11.7 - 15.4 g/dL    HCT 29.2 (L) 35.8 - 46.3 %    MCV 92.4 79.6 - 97.8 FL    MCH 30.7 26.1 - 32.9 PG    MCHC 33.2 31.4 - 35.0 g/dL    RDW 15.9 (H) 11.9 - 14.6 %    PLATELET 87 (L) 713 - 450 K/uL    MPV 10.5 (L) 10.8 - 14.1 FL    DF AUTOMATED      NEUTROPHILS 68 43 - 78 %    LYMPHOCYTES 18 13 - 44 %    MONOCYTES 11 4.0 - 12.0 %    EOSINOPHILS 3 0.5 - 7.8 %    BASOPHILS 0 0.0 - 2.0 %    IMMATURE GRANULOCYTES 0.4 0.0 - 5.0 %    ABS. NEUTROPHILS 3.5 1.7 - 8.2 K/UL    ABS. LYMPHOCYTES 1.0 0.5 - 4.6 K/UL    ABS. MONOCYTES 0.6 0.1 - 1.3 K/UL    ABS. EOSINOPHILS 0.2 0.0 - 0.8 K/UL    ABS. BASOPHILS 0.0 0.0 - 0.2 K/UL    ABS. IMM. GRANS. 0.0 0.0 - 0.5 K/UL   GLUCOSE, POC    Collection Time: 09/19/17  7:31 AM   Result Value Ref Range    Glucose (POC) 74 65 - 100 mg/dL   GLUCOSE, POC    Collection Time: 09/19/17 12:52 PM   Result Value Ref Range    Glucose (POC) 107 (H) 65 - 100 mg/dL         Functional Assessment:  Functional Assessment  Fall in Past 12 Months: No  Decline in Gait/Transfer/Balance: No  Decline in Capacity to Feed/Dress/Bathe: No  Developmental Delay: No  Chewing/Swallowing Problems: No  Difficulty with Secretions: No  Speech Slurred/Thick/Garbled: No     Barrett Score:        Speech Assessment:                     Ambulation:  Activity and Safety  Activity Level:  Up with Assistance  Ambulate: Ambulate in room  Activity: In bed  Activity Assistance: Partial (two people)  Weight Bearing Status: WBAT (Weight Bearing as Tolerated)  Mode of Transportation: Stretcher, IV equipment  Repositioned: Head of bed elevated (degrees)  Patient Turned: Turns self  Head of Bed Elevated: Self regulated  Assistive Device: Walker (comment)  Safety Measures: Bed/Chair-Wheels locked, Bed in low position, Call light within reach, Gripper socks, Side rails X2  Venipuncture/BP Precautions: Venipuncture/BP precautions LUE     Impression/Plan:     Active Problems:    Hypercalcemia (9/15/2017)      ILYA (acute kidney injury) ([facility] (9/15/2017)      [facility] (9/15/2017)      Profound debilitation due to complex medical course with newly diagnosed lung ca with mets    Recommendations: Continue Acute Rehab Program  Coordination of rehab/medical care  Counseling of PM & R care issues management  Monitoring and management of medical conditions per plan of care/orders   -cont PT, recomm OT  -patient is not capable of caring for self and does not have the assistance at home that she will require. I do not think that her fatigue will improve given her disease process. She does not feel that she could participate actively in [address] [place] [address] [employer] level of care/therapies and is open to SNF placement. She may need short to long term care as she may not have the option of going home if she is not able to care for self. CM is involved. -strict I/Os, daily wts; f/u CXR? Cont to diurese while watching renal status.   -pt anxious to discuss treatment/care plan with oncology  Discussion with patient/Staff  Reviewed Therapies/Labs/Meds/Records    Signed By:  Estella Rosario MD     September 19, 2017

## 2017-09-19 NOTE — PROGRESS NOTES
Pt is AO X 3. PT is on RA. Pt is sitting up in recliner. No needs voiced. No SOB or pain is reported at this time. No distress is noted. Call light is within reach. Will continue to monitor.

## 2017-09-19 NOTE — PROGRESS NOTES
[facility]        Inpatient Hematology / Oncology Progress Note      Admission Date: 9/15/2017 10:20 AM  Reason for Admission/Hospital Course: Unknown Malignancy  Hypercalcemia  Acute Renal Insufficency  Hypercalcemia  ILYA (acute kidney injury) ([facility] [address]  Metastasis to bone of unknown primary (HCC)      24 Hour Events:  Creatinine stable at 1.19  Corrected Ca+ 11.12 continue calcitonin  Weight up 8 lbs today-continue with lasix  Consult 9th floor      ROS:  Constitutional: Negative for fever, chills, weakness, malaise, fatigue. CV: Negative for chest pain, palpitations, edema. Respiratory: +dyspnea. Negative for cough, wheezing. GI: Negative for nausea, abdominal pain, diarrhea. 10 point review of systems is otherwise negative with the exception of the elements mentioned above in the HPI. No Known Allergies    OBJECTIVE:  Patient Vitals for the past 8 hrs:   BP Temp Pulse Resp SpO2 Weight   17 0700 146/76 97.8 °F (36.6 °C) 99 20 98 % -   17 0353 135/60 97.4 °F (36.3 °C) 81 20 96 % 221 lb 12.8 oz (100.6 kg)     Temp (24hrs), Av.8 °F (36.6 °C), Min:97.4 °F (36.3 °C), Max:98.3 °F (36.8 °C)     0701 -  1900  In: 381 [I.V.:381]  Out: -     Physical Exam:  Constitutional: Well developed, well nourished female in no acute distress, sitting comfortably in the bed family at bedside. HEENT: Normocephalic and atraumatic. Oropharynx is clear, mucous membranes are moist.  Extraocular muscles are intact. Lymph node   Deferred   Skin Warm and dry. No bruising and no rash noted. No erythema. No pallor. Respiratory Lungs are clear to auscultation bilaterally without wheezes, rales or rhonchi, normal air exchange without accessory muscle use. CVS Normal rate, regular rhythm and normal S1 and S2. No murmurs, gallops, or rubs. Abdomen Soft, nontender and nondistended, normoactive bowel sounds. No palpable mass. No hepatosplenomegaly.    Neuro Grossly nonfocal with no obvious sensory or motor deficits. MSK Normal range of motion in general.  No tenderness. +Generalized edema with 2+ BLE edema   Psych Appropriate mood and affect. Labs:      Recent Labs      09/19/17 0407 09/18/17 0341   WBC  5.2  6.1   RBC  3.16*  3.35*   HGB  9.7*  10.3*   HCT  29.2*  30.8*   MCV  92.4  91.9   MCH  30.7  30.7   MCHC  33.2  33.4   RDW  15.9*  15.7*   PLT  87*  98*   GRANS  68  62   LYMPH  18  22   MONOS  11  13*   EOS  3  3   BASOS  0  0   IG  0.4  0.5   DF  AUTOMATED  AUTOMATED   ANEU  3.5  3.8   ABL  1.0  1.3   ABM  0.6  0.8   FRANKO  0.2  0.2   ABB  0.0  0.0   AIG  0.0  0.0        Recent Labs      09/19/17 0407 09/18/17   0341 09/17/17   1020   NA  143  141  141   K  4.2  4.1  4.0   CL  114*  113*  112*   CO2  18*  19*  18*   AGAP  11  9  11   GLU  75  100  178*   BUN  18  19  24*   CREA  1.19*  1.20*  1.34*   GFRAA  57*  56*  49*   GFRNA  47*  46*  41*   CA  10.0  11.7*  11.5*   SGOT  105*  122*  116*   AP  203*  236*  226*   TP  5.9*  6.4  6.4   ALB  2.6*  2.8*  2.9*   GLOB  3.3  3.6*  3.5   AGRAT  0.8*  0.8*  0.8*   MG  2.0  1.5*   --          Imaging:  XR CHEST SNGL V [[id number]] Collected: 09/15/17 1506      Order Status: Completed Updated: 09/15/17 1509     Narrative:       Single portable upright chest x-ray September 15, 2017    Reference exam: August 14, 2017    INDICATION: PICC line placement    FINDINGS: Right-sided electronic device with median sternotomy clips and wires  are seen with lungs underinflated. There is minimal stranding blurring the  vascular borders, left-sided PICC line tip overlies the upper superior vena cava  at the azygos level.       Impression:       IMPRESSION: Catheter tip overlies the upper superior vena cava.          ASSESSMENT:    Problem List  Date Reviewed: 9/15/2017          Codes Class Noted    Hypercalcemia ICD-10-CM: N46.98  ICD-9-CM: 275.42  9/15/2017        ILYA (acute kidney injury) ([facility] [address] ICD-10-CM: N17.9  ICD-9-CM: 584.9  9/15/2017        Metastasis to bone of unknown primary [facility]) ICD-10-CM: C79.51, C80.1  ICD-9-CM: 198.5, 199.1  9/15/2017        Controlled type 2 diabetes mellitus without complication, without long-term current use of insulin (HCC) ICD-10-CM: E11.9  ICD-9-CM: 250.00  7/18/2017        Comprehensive diabetic foot examination, type 2 DM, encounter for [facility]) ICD-10-CM: E11.9  ICD-9-CM: 250.00  3/89/4315        Metabolic acidosis D-49-DENZEL: E87.2  ICD-9-CM: 276.2  6/22/2017        Controlled type 2 diabetes mellitus with complication, without long-term current use of insulin ([facility] [address] ICD-10-CM: E11.8  ICD-9-CM: 250.90  6/14/2017        Aortic valve disease ICD-10-CM: I35.9  ICD-9-CM: 424.1  5/22/2017        Irritable bowel syndrome with both constipation and diarrhea ICD-10-CM: K58.2  ICD-9-CM: 564.1  2/22/2017        Acquired hypothyroidism ICD-10-CM: E03.9  ICD-9-CM: 244.9  12/15/2016        Cardiac pacemaker ICD-10-CM: Z95.0  ICD-9-CM: V45.01  Unknown        Hypertension, essential ICD-10-CM: I10  ICD-9-CM: 401.9  Unknown        Hyperlipidemia ICD-10-CM: E78.5  ICD-9-CM: 272.4  Unknown        Paroxysmal atrial fibrillation (HCC) ICD-10-CM: I48.0  ICD-9-CM: 427.31  Unknown        Chronic kidney disease (CKD), stage III (moderate) ICD-10-CM: N18.3  ICD-9-CM: 146. 3  Unknown    Overview Signed 12/15/2016 10:08 AM by Alesia España MD     Followed by Dr. Alayna Angulo             Vertigo, benign positional ICD-10-CM: H81.10  ICD-9-CM: 386.11  Unknown            69 y.o. F consulted for multiple bone metastases of unknown source. We discussed this is most likely malignancy although SPEP is negative but myeloma actually is usu negative for bone scan, agreed with CT C/A/P, norco prn pain, remeron for anorexia and weight loss, check tumor markers, last colonoscopy done in 2009 and will discuss whether need to repeat.  However lab returned showed hypercalcemia and ILYA, called pt back to arrange admission, need aggressive resuscitation IVF, monitor Cr and Nabeel level and add bisphophonate/calcitonin as needed, tumor marker very high for CEA/, highly suspect abd malignancy and arrange CT and biopsy once Cr improved, pain control. PLAN:  Multiple Bone Mets of unknown source  - SPEP neg, CT C/A/P, CEA and CA 19-9 elevated, highly suspect abd malignancy  9/17 Obtain CT and biopsy once Cr improves - possibly tomorrow  9/18 Cr improved to patient's baseline. Get CT C/A/P today. 9/19 CT consistent with primary lung cancer, cirrhosis, choleithiasis    Acute on CKD  - Aggressive IVF  9/16 Cr improved to 1.69 from 2.23 yesterday. Con't aggressive IVF. 9/17 Cr down to 1.34. Con't IVF. 9/18 Cr continues to improve. Cr down to 1.20 which appears to be around patient's baseline. Hypercalcemia  - IVF, add Aredia/calcitonin as needed  9/16 Corrected calcium down to 12.48 from 14.26 yesterday. Con't aggressive IVF. 9/17 Ca continues to trend down. Con't IVF. Give Aredia. 9/18 Rosey Ca 12.66, slightly up today. Aredia given yesterday. Start Calcitonin today. 9/19 Corrected Ca 11.12. Continue calcitonin    Electrolyte Imbalance  - Replete prn per Cyndi SOPs    Malodorous urine  9/16 Check UA, UCx.  UA with moderate leukocytes - start Rocephin and await UCx results  9/17 Preliminary UCx results with mixed skin cherise. Con't Rocephin until final report - if remains negative, will DC Rocephin. 9/18 UCx with mixed skin cherise. DC Rocephin. Weakness  9/17 Consult PT    Dyspnea/Fluid overload  9/18 Pt appears more SOB today most likely r/t to fluid overload. Has been receiving aggressive IVF. Also 12# wt gain noted from yesterday. Give lasix x 1.  9/19 Weight up again today-continue with lasix. Consult 9th floor and  to arrange rehab. She is ready for discharge once arranged.      Continue home meds  Cyndi SOPs  DVT prophylaxis: On Xarelcristina Pérez, NP   [facility]  [address]  Office : [phone]  Fax : [phone]           Attending Addendum:  I personally evaluated the patient with Carolyn Yates, N.P.,  and agree with the assessment, findings and plan as documented. Appears stable, heart regular without murmur, we will try to send her to in-patient rehab.               Kiera Anthony MD  [address]  Office : [phone]  Fax : [phone]

## 2017-09-19 NOTE — PROGRESS NOTES
Problem: Mobility Impaired (Adult and Pediatric)  Goal: *Acute Goals and Plan of Care (Insert Text)  1. Ms. Harriet Hurtado will perform supine to sit and sit to supine independently in 7 days. 2. Ms. Harriet Hurtado will perform sit to stand and bed to chair independently in 7 days. 3. Ms. Harriet Hurtado will perform gait with least restrictive device 200 ft independently in 7 days. 4. Ms. Harriet Hurtado will perform up and down 2 steps with rail independently in 7 days. 5. Ms. Harriet Hurtado will perform therex to bilateral lower extremities x 25 reps in sitting in 7 days. PHYSICAL THERAPY: INITIAL ASSESSMENT 9/19/2017  INPATIENT: Hospital Day: 5  [place] / [place] / Product Type: Medicare /      NAME/AGE/GENDER: Casey Garcia is a 69 y.o. female        PRIMARY DIAGNOSIS: Unknown Malignancy  Hypercalcemia  Acute Renal Insufficency  Hypercalcemia  ILYA (acute kidney injury) ([facility] [address]  Metastasis to bone of unknown primary ([facility] [address] <principal problem not specified> <principal problem not specified>        ICD-10: Treatment Diagnosis:       · Generalized Muscle Weakness (M62.81)  · Difficulty in walking, Not elsewhere classified (R26.2)   Precaution/Allergies:  Review of patient's allergies indicates no known allergies. ASSESSMENT:      Ms. Harriet Hurtado presents sitting EOB with  present in hallway.  expressed concern, \"I am not able to take care of her at home. I am on peritoneal dialysis and need to be hooked up to a machine for 9 hours--I would not be able to help her if she needed me during that time. I am weaker now too. \" Pt stated that she was experiencing 7/10 pain in her abdomen and under her shoulder blades, but she is feeling better and more clear than yesterday. She was able to stand with Mod Assist x2 and ambulate for 13' with RW and Min Assist from therapist. Pt sat with Moderate Assist and verbal cues.  She felt SOB following ambulation and demonstrated 5x pursed-lip breathing. Pt completed x5 seated marches B, x5 LAQ B, and 2x sit to  recliner. Future care plans were discussed with pt and  and  was notified. Ms. Dulce Hinojosa may benefit from a post acute stay at time of discharge. Dennie Ralph is currently functioning below her baseline and would benefit from skilled PT during acute care stay to maximize safety and independence with functional mobility. This section established at most recent assessment   PROBLEM LIST (Impairments causing functional limitations):  1. Decreased Strength  2. Decreased Transfer Abilities  3. Decreased Ambulation Ability/Technique  4. Decreased Activity Tolerance    INTERVENTIONS PLANNED: (Benefits and precautions of physical therapy have been discussed with the patient.)  1. Bed Mobility  2. Gait Training  3. Therapeutic Activites  4. Therapeutic Exercise/Strengthening  5. Transfer Training      TREATMENT PLAN: Frequency/Duration: 3 times a week for duration of hospital stay  Rehabilitation Potential For Stated Goals: GOOD      RECOMMENDED REHABILITATION/EQUIPMENT: (at time of discharge pending progress): Due to the probability of continued deficits (see above) this patient will likely need continued skilled physical therapy after discharge.   Equipment:   · Walkers, Type: Rolling Walker                   HISTORY:   History of Present Injury/Illness (Reason for Referral):  Ms. Dulce Hinojosa is a 69 y.o. white female with a history of aortic valve disease s/p  Ross procedure in 1997 with complete AV block post-procedure and pacemaker implantation, paroxysmal atrial fibrillation, stage III chronic kidney disease, type II diabetes, metabolic acidosis, IBS, hypothyroidism s/p total thyroidectomy in 1986, hypertension, hyperlipidemia, vertigo, chronic back pain, GERD, hepatitis A, hysterectomy in 1986, morbid obesity, anemia, osteoarthritis of left knee s/p total knee replacement on 2/28/11, benign left breast lumpectomy and tonsillectomy. She initially presented to PCP on 7/10/2017 reporting a one week history of cold and cough. She stated she had been coughing so much that she had developed rib and back pain. Review of systems and physical exam was consistent with acute bronchitis. She was prescribed Hycodan and inhaled steroids and sent for chest x-ray to rule out atypical pneumonia. X-ray, completed on 7/12/17, was within normal limits. Patient was seen urgently by her cardiologist, Dr. Hardik Pearce on 7/19/17 reporting back pain radiating around to her chest worsening with deep breathing, coughing and turning as well as shortness of breath. Echo completed on 8/4/17 showed EF >55%, and cardiac work-up determined that symptoms did not appear to be true cardiac related. Patient requested referral to pulmonology. Repeat chest x-ray was performed on 8/14/17 as well as a nuclear medicine lung ventilation perfusion scan. Chest x-ray showed no acute cardiopulmonary process, and pulmonary perfusion scan was normal with no evidence of acute pulmonary embolism. Of note, patient also underwent bilateral diagnostic screening mammogram on 8/14/17 which showed no suspicious finding in either breast.       Patient was also seen by her nephrologist, Dr. Marjorie Ji in August of 2017 who ordered bone scan was completed on 8/30/17 demonstrating multifocal radiotracer activity concerning for metastatic disease. CT imaging of the chest, abdomen and pelvis was recommended for further assessment to identify potential primary neoplasm. Upon receiving results of bone scan, patient was referred to [facility] for oncology evaluation. CT scan of the chest, abdomen and pelvis are scheduled to be performed on 9/15/17. Past Medical History/Comorbidities:   Ms. Wilberto Rivero  has a past medical history of Aortic stenosis; AV block, 3rd degree (HCC) (2/13/2016); Cardiac pacemaker; Chest pain; Chronic kidney disease (CKD), stage III (moderate);  Chronic pain; Diabetes mellitus, type II ([facility] [address] GERD (gastroesophageal reflux disease); Hepatitis A; Hyperlipidemia; Hypertension; Hypertension, essential; hypothyroidism; IBS (irritable bowel syndrome); Morbid obesity ([facility] [address] Osteoarthritis of left knee (2/28/2011); Paroxysmal atrial fibrillation ([facility] [address] S/P total knee replacement using cement (2/28/2011); S/P total knee replacement using cement (2/28/2011); and Vertigo, benign positional.  Ms. Lucie Lance  has a past surgical history that includes hysterectomy (1986); thyroidectomy (1986); breast lumpectomy (1993); tonsillectomy (as a child); other surgical (1996); cardiac surg procedure unlist (1997); pacemaker (1997 & 2008); and orthopaedic (2/2011). Social History/Living Environment:   Home Environment: Private residence  # Steps to Enter: 1  One/Two Story Residence: Two story, live on 1st floor  Living Alone: No  Support Systems: Spouse/Significant Other/Partner  Patient Expects to be Discharged to[de-identified] Unknown  Current DME Used/Available at Home: Cane, straight  Prior Level of Function/Work/Activity:  independent  Obese, chronic pain,    Number of Personal Factors/Comorbidities that affect the Plan of Care: 1-2: MODERATE COMPLEXITY   EXAMINATION:   Most Recent Physical Functioning:   Gross Assessment:  AROM: Generally decreased, functional  Strength: Generally decreased, functional               Posture:  Posture (WDL): Exceptions to WDL  Posture Assessment: Forward head, Rounded shoulders, Trunk flexion  Balance:  Sitting: Intact  Standing: Intact  Standing - Static: Good  Standing - Dynamic : Fair Bed Mobility:     Wheelchair Mobility:     Transfers:  Sit to Stand:  Moderate assistance  Stand to Sit: Minimum assistance  Gait:     Base of Support: Widened  Step Length: Left shortened;Right shortened (with excessive weight shift/trunk sway)  Distance (ft): 15 Feet (ft)  Assistive Device: Walker, rolling  Ambulation - Level of Assistance: Minimal assistance       Body Structures Involved:  1. Muscles Body Functions Affected:  1. Movement Related Activities and Participation Affected:  1. Mobility   Number of elements that affect the Plan of Care: 3: MODERATE COMPLEXITY   CLINICAL PRESENTATION:   Presentation: Evolving clinical presentation with changing clinical characteristics: MODERATE COMPLEXITY   CLINICAL DECISION MAKIN [facility] Mobility Inpatient Short Form  How much difficulty does the patient currently have. .. Unable A Lot A Little None   1. Turning over in bed (including adjusting bedclothes, sheets and blankets)? [ ] 1   [ ] 2   [X] 3   [ ] 4   2. Sitting down on and standing up from a chair with arms ( e.g., wheelchair, bedside commode, etc.)   [ ] 1   [ ] 2   [X] 3   [ ] 4   3. Moving from lying on back to sitting on the side of the bed? [ ] 1   [X] 2   [ ] 3   [ ] 4   How much help from another person does the patient currently need. .. Total A Lot A Little None   4. Moving to and from a bed to a chair (including a wheelchair)? [ ] 1   [ ] 2   [X] 3   [ ] 4   5. Need to walk in hospital room? [ ] 1   [X] 2   [ ] 3   [ ] 4   6. Climbing 3-5 steps with a railing? [ ] 1   [X] 2   [ ] 3   [ ] 4   © , Trustees of [address] [place] [address], under license to [employer]. All rights reserved    Score:  Initial: 15 Most Recent: X (Date: -- )     Interpretation of Tool:  Represents activities that are increasingly more difficult (i.e. Bed mobility, Transfers, Gait).        Score 24 23 22-20 19-15 14-10 9-7 6       Modifier CH CI CJ CK CL CM CN         · Mobility - Walking and Moving Around:               - CURRENT STATUS:    CK - 40%-59% impaired, limited or restricted               - GOAL STATUS:           CK - 40%-59% impaired, limited or restricted               - D/C STATUS:                       ---------------To be determined---------------  [place] / [place] / Product Type: Medicare /       Medical Necessity:     · Patient is expected to demonstrate progress in functional technique to increase independence with mobility and gait. .  Reason for Services/Other Comments:  · Patient continues to require present interventions due to patient's inability to function at baseline. Use of outcome tool(s) and clinical judgement create a POC that gives a: Questionable prediction of patient's progress: MODERATE COMPLEXITY                 TREATMENT:   (In addition to Assessment/Re-Assessment sessions the following treatments were rendered)   Pre-treatment Symptoms/Complaints:  \"same pain in abdomen, feeling it behind my shoulder blades too\"  Pain: Initial:   Pain Intensity 1: 7  Pain Location 1: Abdomen, Shoulder  Post Session:  Feeling better, a little tired      Therapeutic Activity: (    25): Therapeutic activities including Bed transfers, Chair transfers and Ambulation on level ground to improve mobility, strength and coordination. Required moderate   to promote dynamic balance in standing and promote motor control of bilateral, lower extremity(s). Date:  09/19/2017 Date:   Date:     Activity/Exercise Parameters Parameters Parameters   Sit to Stand x3     Long Arch Quads (LAQ) X5, B     Seated Marching X5, B     Pursed-Lip Breathing x5                               Braces/Orthotics/Lines/Etc:   · IV  · O2 Device: Room air  Treatment/Session Assessment:    · Response to Treatment:  good  · Interdisciplinary Collaboration:  · Physical Therapist, Registered Nurse,  and SPT  · After treatment position/precautions:  · Up in chair, Caregiver at bedside, Call light within reach and RN notified  · Compliance with Program/Exercises: Will assess as treatment progresses. · Recommendations/Intent for next treatment session: \"Next visit will focus on advancements to more challenging activities and reduction in assistance provided\".   Total Treatment Duration:PT Patient Time In/Time Out  Time In: 1139  Time Out: [place], [place]

## 2017-09-19 NOTE — PROGRESS NOTES
END OF SHIFT NOTE:    Intake/Output  09/19 0701 - 09/19 1900  In: 861 [P.O.:480; I.V.:381]  Out: 400 [Urine:400]   Voiding: YES  Catheter: NO  Drain:              Stool:  2 occurrences. Stool Assessment  Stool Color: Earmon Rom (09/19/17 1549)  Stool Appearance: Soft (09/19/17 1549)  Stool Amount: Small (09/19/17 1549)  Stool Source/Status: Rectum (09/19/17 1549)    Emesis:  0 occurrences. VITAL SIGNS  Patient Vitals for the past 12 hrs:   Temp Pulse Resp BP SpO2   09/19/17 1552 97.5 °F (36.4 °C) (!) 102 24 139/71 96 %   09/19/17 1215 97.8 °F (36.6 °C) 76 20 154/76 98 %   09/19/17 0700 97.8 °F (36.6 °C) 99 20 146/76 98 %       Pain Assessment  Pain 1  Pain Scale 1: Visual (09/19/17 1830)  Pain Intensity 1: 0 (09/19/17 1830)  Patient Stated Pain Goal: 0 (09/19/17 0136)  Pain Reassessment 1: Yes (09/19/17 1830)  Pain Onset 1: when up to bedside commode (09/19/17 0136)  Pain Location 1: Back (09/19/17 1806)  Pain Orientation 1: Upper (09/19/17 1806)  Pain Description 1: Aching (09/19/17 1806)  Pain Intervention(s) 1: Medication (see MAR) (09/19/17 1806)    Ambulating  Yes    Additional Information:     Shift report will be given to oncoming nurse at the bedside.     Smooth Miller RN

## 2017-09-20 PROBLEM — R53.81 DEBILITY: Chronic | Status: ACTIVE | Noted: 2017-01-01

## 2017-09-20 PROBLEM — R91.8 LUNG MASS: Status: ACTIVE | Noted: 2017-01-01

## 2017-09-20 NOTE — CONSULTS
PULMONARY/CCM CONSULT :  9/20/2017    Date of Admission:  9/15/2017    The patient's chart has been reviewed and the chart has been discussed with nursing staff. Subjective: This patient has been seen and evaluated at the request of Dr. Leah Landau. Patient is a 69 y.o.  female presents with abnormal Chest CT from 9/15. She saw Hem/onc on 9/15 per referal from Dr Dora Velazquez for bone pain and to evaluate for metastatic disease. She had a  CXR on 9/15 followed by Chest CT on 9/18 showing LLL lung mass with adenopathy. There were multiple bony lesions in the thoracic spine noted. She is a lifelong never smoker. We were asked to see for lung mass biopsy. She has underlying CAD and Atrial fibrillation on xarelto 20 mg daily. She is not hypoxic and is tolerating Room air. She has never smoked. She has declined significantly over the past 2 months and more so over the past 4 weeks. She can barely walk to the bathroom now. She has lost over 40 pounds, unintentionally.        Past Medical History:   Diagnosis Date    Aortic stenosis     AVR 1997    AV block, 3rd degree (HCC) 2/13/2016    Cardiac pacemaker     Chest pain     Chronic kidney disease (CKD), stage III (moderate)     Chronic pain      lower back    Diabetes mellitus, type II (HCC)     GERD (gastroesophageal reflux disease)     controlled with medication    Hepatitis A     dx after hysterectomy 1986 - pt states no problems now    Hyperlipidemia     Hypertension     controlled with medication    Hypertension, essential     hypothyroidism     hx of total thyroidectomy in 1986    IBS (irritable bowel syndrome)     pt states has hx of diverticulitis     Morbid obesity (HCC)     bmi=40    Osteoarthritis of left knee 2/28/2011    Paroxysmal atrial fibrillation (HCC)     S/P total knee replacement using cement 2/28/2011    S/P total knee replacement using cement 2/28/2011    Vertigo, benign positional       Past Surgical History: Procedure Laterality Date    CARDIAC SURG PROCEDURE UNLIST  1997    mitral valve replacement \"Ross Procedure\" per pt    HX BREAST LUMPECTOMY  1993    left breast - benign per pt    HX HYSTERECTOMY  1986    HX ORTHOPAEDIC  2/2011    left knee replacement    HX OTHER SURGICAL  1996    basal cell carcinoma removed from face   [address] & [address]    pacemaker placed in 1997 due to \"complete heart block\" after mitral valve replacement - 100 % dependant on pacemaker    HX TONSILLECTOMY  as a child    THYROIDECTOMY  1986    total      Social History   Substance Use Topics    Smoking status: Never Smoker    Smokeless tobacco: Never Used    Alcohol use No      Family History   Problem Relation Age of Onset    Cancer Mother      breast    Breast Cancer Mother     Heart Attack Father     Heart Failure Father     Kidney Disease Father     Cancer Maternal Aunt      Breast CA    Breast Cancer Maternal Aunt     Cancer Maternal Grandmother      Breast CA    Breast Cancer Maternal Grandmother     Heart Disease Maternal Aunt       No Known Allergies   Prior to Admission Medications   Prescriptions Last Dose Informant Patient Reported? Taking? B.infantis-B.ani-B.long-B.bifi (PROBIOTIC 4X) 10-15 mg TbEC 9/15/2017 at Unknown time  Yes Yes   Sig: Take  by mouth daily. Calcium Carbonate-Vit D3-Min (CALCIUM-VITAMIN D) 600-400 mg-unit Tab 9/8/2017 at Unknown time  Yes Yes   Sig: Take 2 Tabs by mouth daily. HYDROcodone-acetaminophen (NORCO) 5-325 mg per tablet 9/15/2017 at Unknown time  No Yes   Sig: Take 1 Tab by mouth every six (6) hours as needed for Pain. Max Daily Amount: 4 Tabs. SYNTHROID 125 mcg tablet 9/15/2017 at Unknown time  No Yes   Sig: Take 1 Tab by mouth Daily (before breakfast). MARCIO   amLODIPine (NORVASC) 5 mg tablet 9/15/2017 at Unknown time  No Yes   Sig: Take 1 Tab by mouth daily. Patient taking differently: Take 2.5 mg by mouth daily.    azelastine (ASTELIN) 137 mcg (0.1 %) nasal spray 9/15/2017 at Unknown time  No Yes   Si [place] by Both Nostrils route two (2) times a day. Use in each nostril as directed   cyclobenzaprine (FLEXERIL) 5 mg tablet 2017 at Unknown time  Yes Yes   Sig: Take 5 mg by mouth.   glyBURIDE (DIABETA) 2.5 mg tablet 2017 at Unknown time  Yes Yes   Sig: Take 1.25 mg by mouth two (2) times daily (with meals). metFORMIN (GLUMETZA ER) 500 mg TG24 24 hour tablet 9/15/2017 at Unknown time  Yes Yes   Sig: Take  by mouth daily. mirtazapine (REMERON) 7.5 mg tablet 2017 at Unknown time  No Yes   Sig: Take 1 Tab by mouth nightly. multivitamin (ONE A DAY) tablet 9/15/2017 at Unknown time  Yes Yes   Sig: Strength: Multiple Vitamins; Form: capsule; SIG: take 1 cap(s) orally once a day   omega-3 fatty acids-vitamin e (FISH OIL) 1,000 mg Cap 9/15/2017 at Unknown time  Yes Yes   Sig: Take 1 Cap by mouth every morning. Stopped 4/15/12   omeprazole (PRILOSEC) 20 mg capsule 9/15/2017 at Unknown time  Yes Yes   Sig: Take 20 mg by mouth daily. rivaroxaban (XARELTO) 20 mg tab tablet 2017 at Unknown time  No Yes   Sig: Take 1 Tab by mouth daily (with dinner). valsartan-hydroCHLOROthiazide (DIOVAN-HCT) 320-25 mg per tablet 9/15/2017 at Unknown time  No Yes   Sig: Take 1 Tab by mouth daily. vitamin B complex (VITAMINS B COMPLEX) capsule 9/15/2017 at Unknown time  Yes Yes   Sig: Strength: Vitamin B Complex;  Form: capsule; SIG: take 1 cap(s) orally twice a day      Facility-Administered Medications: None       MEDS SCHEDULED:    Current Facility-Administered Medications   Medication Dose Route Frequency    furosemide (LASIX) injection 40 mg  40 mg IntraVENous DAILY    HYDROcodone-acetaminophen (NORCO) 5-325 mg per tablet 1 Tab  1 Tab Oral Q4H PRN    magnesium oxide (MAG-OX) tablet 400 mg  400 mg Oral TID    docusate sodium (COLACE) capsule 100 mg  100 mg Oral DAILY    polyethylene glycol (MIRALAX) packet 17 g  17 g Oral DAILY PRN    glyBURIDE (DIABETA) tablet 1.25 mg  1.25 mg Oral BID WITH MEALS    mirtazapine (REMERON) tablet 7.5 mg  7.5 mg Oral QHS    pantoprazole (PROTONIX) tablet 40 mg  40 mg Oral ACB    levothyroxine (SYNTHROID) tablet 125 mcg  125 mcg Oral ACB    ondansetron (ZOFRAN ODT) tablet 8 mg  8 mg Oral Q8H PRN    ondansetron (ZOFRAN) injection 4 mg  4 mg IntraVENous Q4H PRN    morphine injection 2 mg  2 mg IntraVENous Q4H PRN    sodium chloride (NS) flush 20 mL  20 mL InterCATHeter Q8H    heparin (porcine) pf 600 Units  600 Units InterCATHeter Q8H    sodium chloride (NS) flush 20 mL  20 mL InterCATHeter PRN    heparin (porcine) pf 600 Units  600 Units InterCATHeter PRN    amLODIPine (NORVASC) tablet 5 mg  5 mg Oral DAILY    valsartan (DIOVAN) tablet 320 mg  320 mg Oral DAILY         Review of Systems  Constitutional: positive for fatigue  Respiratory: positive for dyspnea on exertion  Cardiovascular: negative for chest pain, chest pressure/discomfort, palpitations, irregular heart beats, near-syncope, syncope  Musculoskeletal:positive for debility    Objective:     Vitals:    09/19/17 2331 09/20/17 0514 09/20/17 0655 09/20/17 1049   BP: 133/64 153/85 155/77 143/71   Pulse: 97 (!) 110 (!) 101 92   Resp: 20 18 18 18   Temp: 98.2 °F (36.8 °C) 97.5 °F (36.4 °C) 97.5 °F (36.4 °C) 97.2 °F (36.2 °C)   SpO2: 96% 94% 96% 98%   Weight:  225 lb 1.6 oz (102.1 kg)       09/20 0701 - 09/20 1900  In: 490 [P.O.:490]  Out: 350 [Urine:350]  09/18 1901 - 09/20 0700  In: 3600 [P.O.:600; I.V.:3000]  Out: 1350 [Urine:1350]      PHYSICAL EXAM     Physical Exam:   General:  Alert,obese   Eyes:  Conjunctivae/corneas clear. Nose: Nares patent and moist. Septum midline. Mouth/Throat: Lips, mucosa, and tongue pink and intact. Neck: Supple, symmetrical.   Respiratory:   Increased work of breathing, clear to auscultation bilaterally on on RA   Cardiovascular:  Regular rate and rhythm, S1, S2, no murmur, click, rub or gallop.   Telemetry monitor:NSR   GI: Abdomen soft, non-tender. Bowel sounds active X 4 Q. No masses,     Musculoskeletal: Extremities symmetrical, atraumatic, no cyanosis, *edema. Pulses: 2+ and symmetric all extremities. Skin: Skin color, texture, turgor normal. No rashes or lesions       Neurologic: 2+ strength bilateral upper and lower extremities, sensation throughout appropriate. Alert and oriented. Activity:limited    Nutrition:ADA        LABS    Recent Labs      09/20/17   0644  09/19/17   0407  09/18/17   0341   WBC  9.3  5.2  6.1   HGB  10.7*  9.7*  10.3*   HCT  32.1*  29.2*  30.8*   PLT  123*  87*  98*     Recent Labs      09/20/17 0644  09/19/17   0407  09/18/17   0341   NA  145  143  141   K  3.5  4.2  4.1   CL  117*  114*  113*   GLU  90  75  100   CO2  17*  18*  19*   BUN  13  18  19   CREA  0.92  1.19*  1.20*   MG  1.6*  2.0  1.5*       CT of the Chest, Abdomen, and Pelvis     INDICATION:  Metastatic bone disease, unknown primary     Multiple axial images were obtained through the chest, abdomen, and pelvis after  intravenous injection of 125cc of optiray 350. Radiation dose reduction  techniques were used for this study: All CT scans performed at this facility  use one or all of the following: Automated exposure control, adjustment of the  mA and/or kVp according to patient's size, iterative reconstruction.     FINDINGS:  Chest CT: There is an oval 4.5 x 3.1 cm left infrahilar lung mass consistent  with a primary lung tumor. No other significant pulmonary masses are seen. There is a small left pleural effusion. There is a 17 mm left hilar lymph node. There is a 13 mm subcarinal lymph node. There is also a 3.6 x 2.1 cm lymph  node 4L node at the level of jevon. There are several small lytic lesions in  the thoracic spine. There are also several nondisplaced left rib fractures.     Abdomen CT: Liver has an enlarged cirrhotic appearance, 25 cm in length.   There  several small low-attenuation liver lesions, probably cysts.  There is  cholelithiasis, no bile duct dilatation. The adrenal glands and pancreas appear  normal.  There is normal enhancement of the kidneys. There is no hydronephrosis. There is no adenopathy. There is a small amount of perihepatic fluid. There  are multiple small lytic lesions in the lumbar spine.     Pelvis CT: There is also free fluid in the pelvis. There is no significant  pelvic adenopathy. There is a fracture of the superior aspect of the right  ilium, most likely a pathologic fracture. Sigmoid diverticulosis is  incidentally noted.     IMPRESSION  IMPRESSION:    1. Left lower lobe lung mass consistent with primary lung cancer. Associated  left hilar and mediastinal adenopathy. 2.  Multiple bony metastatic lesions in the thoracic and lumbar spine. 3.  Cirrhosis. 4.  Cholelithiasis. Assessment:     Hospital Problems  Date Reviewed: 9/15/2017          Codes Class Noted POA    Lung mass ICD-10-CM: R91.8  ICD-9-CM: 786.6  9/20/2017 Unknown    She has never smoked    Hypercalcemia ICD-10-CM: E83.52  ICD-9-CM: 275.42  9/15/2017 Yes        ILYA (acute kidney injury) [facility]) ICD-10-CM: N17.9  ICD-9-CM: 584.9  9/15/2017 Yes        Metastasis to bone of unknown primary [facility]) ICD-10-CM: C79.51, C80.1  ICD-9-CM: 198.5, 199.1  9/15/2017 Yes              Plan:     Bronchoscopy with EBUS tomorrow with biopsy  Stop Xarelto  NPO after MN    [employer], NP-C    More than 50% of time documented was spent in face-to-face contact with the patient and in the care of the patient on the floor/unit where the patient is located. The patient has been seen and examined by me personally, the chart,labs, and radiographic studies have been reviewed. Chest: CTA  Extremities: trace edema    I agree with the above assessment and plan.     Erika Mcclain MD.

## 2017-09-20 NOTE — PROGRESS NOTES
Met with patient and family in room. Discussed option of rehab  / patient and family in agreement and aware that we will continue to follow up with MD for biopsy  / treatment options . Gave SNF list to patient and family as well as contact number for CM.

## 2017-09-20 NOTE — PROGRESS NOTES
END OF SHIFT NOTE:    Intake/Output  09/20 0701 - 09/20 1900  In: 740 [P.O.:740]  Out: 1125 [Urine:1125]   Voiding: YES  Catheter: NO  Drain:              Stool:  0 occurrences. Stool Assessment  Stool Color: Brown (09/19/17 2332)  Stool Appearance: Loose; Soft (09/19/17 2332)  Stool Amount: Small (09/19/17 2332)  Stool Source/Status: Rectum (09/19/17 2332)    Emesis:  0 occurrences. VITAL SIGNS  Patient Vitals for the past 12 hrs:   Temp Pulse Resp BP SpO2   09/20/17 1520 98.7 °F (37.1 °C) 96 18 143/71 98 %   09/20/17 1049 97.2 °F (36.2 °C) 92 18 143/71 98 %   09/20/17 0655 97.5 °F (36.4 °C) (!) 101 18 155/77 96 %       Pain Assessment  Pain 1  Pain Scale 1: Numeric (0 - 10) (09/20/17 1704)  Pain Intensity 1: 4 (09/20/17 1704)  Patient Stated Pain Goal: 0 (09/20/17 0350)  Pain Reassessment 1: Yes (09/20/17 1704)  Pain Onset 1: pta (09/20/17 0734)  Pain Location 1: Back; Abdomen (09/20/17 1607)  Pain Orientation 1: Upper (upperback) (09/20/17 1607)  Pain Description 1: Aching (09/20/17 1607)  Pain Intervention(s) 1: Medication (see MAR) (09/20/17 1607)    Ambulating  Yes    Additional Information: Pain management with [facility]. Uses walker to ambulate. VSS. No needs at present. Shift report given to oncoming nurse at the bedside.     Carlton Reddy

## 2017-09-20 NOTE — PROGRESS NOTES
[facility]        Inpatient Hematology / Oncology Progress Note      Admission Date: 9/15/2017 10:20 AM  Reason for Admission/Hospital Course: Unknown Malignancy  Hypercalcemia  Acute Renal Insufficency  Hypercalcemia  ILYA (acute kidney injury) ([facility] [address]  Metastasis to bone of unknown primary (HCC)      24 Hour Events:  Creatinine 0.92  Corrected Ca+ 8.98  CT showed lung mass, most likely primary  Need bx of mass - Pulm consulted      ROS:  Constitutional: Negative for fever, chills, weakness, malaise, fatigue. CV: Negative for chest pain, palpitations, edema. Respiratory: +dyspnea. Negative for cough, wheezing. GI: Negative for nausea, abdominal pain, diarrhea. 10 point review of systems is otherwise negative with the exception of the elements mentioned above in the HPI. No Known Allergies    OBJECTIVE:  Patient Vitals for the past 8 hrs:   BP Temp Pulse Resp SpO2 Weight   17 0655 155/77 97.5 °F (36.4 °C) (!) 101 18 96 % -   17 0514 153/85 97.5 °F (36.4 °C) (!) 110 18 94 % 225 lb 1.6 oz (102.1 kg)     Temp (24hrs), Av.7 °F (36.5 °C), Min:97.5 °F (36.4 °C), Max:98.2 °F (36.8 °C)         Physical Exam:  Constitutional: Well developed, well nourished female in no acute distress, lying comfortably in the hospital bed. HEENT: Normocephalic and atraumatic. Oropharynx is clear, mucous membranes are moist.  Extraocular muscles are intact. Lymph node   Deferred   Skin Warm and dry. No bruising and no rash noted. No erythema. No pallor. Respiratory Lungs are clear to auscultation bilaterally without wheezes, rales or rhonchi, normal air exchange without accessory muscle use. CVS Mild tachycardic rate, regular rhythm and normal S1 and S2. No murmurs, gallops, or rubs. Abdomen Soft, nontender and nondistended, normoactive bowel sounds. No palpable mass. No hepatosplenomegaly. Neuro Grossly nonfocal with no obvious sensory or motor deficits.    MSK Normal range of motion in general.  No tenderness. +Generalized edema with 2+ BLE edema   Psych Appropriate mood and affect. Labs:      Recent Labs      09/19/17 0407 09/18/17   0341   WBC  5.2  6.1   RBC  3.16*  3.35*   HGB  9.7*  10.3*   HCT  29.2*  30.8*   MCV  92.4  91.9   MCH  30.7  30.7   MCHC  33.2  33.4   RDW  15.9*  15.7*   PLT  87*  98*   GRANS  68  62   LYMPH  18  22   MONOS  11  13*   EOS  3  3   BASOS  0  0   IG  0.4  0.5   DF  AUTOMATED  AUTOMATED   ANEU  3.5  3.8   ABL  1.0  1.3   ABM  0.6  0.8   FRANKO  0.2  0.2   ABB  0.0  0.0   AIG  0.0  0.0        Recent Labs      09/19/17 0407 09/18/17   0341  09/17/17   1020   NA  143  141  141   K  4.2  4.1  4.0   CL  114*  113*  112*   CO2  18*  19*  18*   AGAP  11  9  11   GLU  75  100  178*   BUN  18  19  24*   CREA  1.19*  1.20*  1.34*   GFRAA  57*  56*  49*   GFRNA  47*  46*  41*   CA  10.0  11.7*  11.5*   SGOT  105*  122*  116*   AP  203*  236*  226*   TP  5.9*  6.4  6.4   ALB  2.6*  2.8*  2.9*   GLOB  3.3  3.6*  3.5   AGRAT  0.8*  0.8*  0.8*   MG  2.0  1.5*   --          Imaging:  XR CHEST SNGL V [[id number]] Collected: 09/15/17 1506      Order Status: Completed Updated: 09/15/17 1509     Narrative:       Single portable upright chest x-ray September 15, 2017    Reference exam: August 14, 2017    INDICATION: PICC line placement    FINDINGS: Right-sided electronic device with median sternotomy clips and wires  are seen with lungs underinflated. There is minimal stranding blurring the  vascular borders, left-sided PICC line tip overlies the upper superior vena cava  at the azygos level.       Impression:       IMPRESSION: Catheter tip overlies the upper superior vena cava.          ASSESSMENT:    Problem List  Date Reviewed: 9/15/2017          Codes Class Noted    Hypercalcemia ICD-10-CM: M16.20  ICD-9-CM: 275.42  9/15/2017        ILYA (acute kidney injury) [facility]) ICD-10-CM: N17.9  ICD-9-CM: 584.9  9/15/2017        Metastasis to bone of unknown primary [facility]) ICD-10-CM: C79.51, C80.1  ICD-9-CM: 198.5, 199.1  9/15/2017        Controlled type 2 diabetes mellitus without complication, without long-term current use of insulin ([facility] ICD-10-CM: E11.9  ICD-9-CM: 250.00  7/18/2017        Comprehensive diabetic foot examination, type 2 DM, encounter for [facility]) ICD-10-CM: E11.9  ICD-9-CM: 250.00  8/78/7228        Metabolic acidosis YNS-54-KU: E87.2  ICD-9-CM: 276.2  6/22/2017        Controlled type 2 diabetes mellitus with complication, without long-term current use of insulin ([facility] ICD-10-CM: E11.8  ICD-9-CM: 250.90  6/14/2017        Aortic valve disease ICD-10-CM: I35.9  ICD-9-CM: 424.1  5/22/2017        Irritable bowel syndrome with both constipation and diarrhea ICD-10-CM: K58.2  ICD-9-CM: 564.1  2/22/2017        Acquired hypothyroidism ICD-10-CM: E03.9  ICD-9-CM: 244.9  12/15/2016        Cardiac pacemaker ICD-10-CM: Z95.0  ICD-9-CM: V45.01  Unknown        Hypertension, essential ICD-10-CM: I10  ICD-9-CM: 401.9  Unknown        Hyperlipidemia ICD-10-CM: E78.5  ICD-9-CM: 272.4  Unknown        Paroxysmal atrial fibrillation (HCC) ICD-10-CM: I48.0  ICD-9-CM: 427.31  Unknown        Chronic kidney disease (CKD), stage III (moderate) ICD-10-CM: N18.3  ICD-9-CM: 912. 3  Unknown    Overview Signed 12/15/2016 10:08 AM by Jada Bass MD     Followed by Dr. Tao Mariee             Vertigo, benign positional ICD-10-CM: H81.10  ICD-9-CM: 386.11  Unknown            69 y.o. F consulted for multiple bone metastases of unknown source. We discussed this is most likely malignancy although SPEP is negative but myeloma actually is usu negative for bone scan, agreed with CT C/A/P, norco prn pain, remeron for anorexia and weight loss, check tumor markers, last colonoscopy done in 2009 and will discuss whether need to repeat.  However lab returned showed hypercalcemia and ILYA, called pt back to arrange admission, need aggressive resuscitation IVF, monitor Cr and Nabeel level and add bisphophonate/calcitonin as needed, tumor marker very high for CEA/, highly suspect abd malignancy and arrange CT and biopsy once Cr improved, pain control. PLAN:  Multiple Bone Mets of unknown source  - SPEP neg, CT C/A/P, CEA and CA 19-9 elevated, highly suspect abd malignancy  9/17 Obtain CT and biopsy once Cr improves - possibly tomorrow  9/18 Cr improved to patient's baseline. Get CT C/A/P today. 9/19 CT consistent with primary lung cancer, cirrhosis, choleithiasis  9/20 Needs lung bx to be able to determine treatment options. Pulm consulted. Acute on CKD  - Aggressive IVF  9/16 Cr improved to 1.69 from 2.23 yesterday. Con't aggressive IVF. 9/17 Cr down to 1.34. Con't IVF. 9/18 Cr continues to improve. Cr down to 1.20. RESOLVED    Hypercalcemia  - IVF, add Aredia/calcitonin as needed  9/16 Corrected calcium down to 12.48 from 14.26 yesterday. Con't aggressive IVF. 9/17 Ca continues to trend down. Con't IVF. Give Aredia. 9/18 Rosey Ca 12.66, slightly up today. Aredia given yesterday. Start Calcitonin today. 9/19 Corrected Ca 11.12. Continue calcitonin  RESOLVED    Electrolyte Imbalance  - Replete prn per Cyndi SOPs    Malodorous urine  9/16 Check UA, UCx.  UA with moderate leukocytes - start Rocephin and await UCx results  9/17 Preliminary UCx results with mixed skin cherise. Con't Rocephin until final report - if remains negative, will DC Rocephin. 9/18 UCx with mixed skin cherise. DC Rocephin. Weakness  9/17 Consult PT  9/20 Consult OT    Dyspnea/Fluid overload  9/18 Pt appears more SOB today most likely r/t to fluid overload. Has been receiving aggressive IVF. Also 12# wt gain noted from yesterday. Give lasix x 1.  9/19 Weight up again today-continue with lasix. 9/20 Continue daily Lasix      Disposition:  Not a 9th floor candidate. SW working on SNF placement. She is ready for discharge once arranged.     Continue home meds  Cyndi SOPs  DVT prophylaxis: On Xarelto Fang Vegas NP   [facility]  [address] [place] [address]  Office : [phone]  Fax : [phone]         Attending Addendum:  I personally evaluated the patient with Fang Vegas NNormaP.,  and agree with the assessment, findings and plan as documented. Appears stable, we will arrange for her to undergo a biopsy of her lung mass.               Sarai Becker MD  [address]  Office : [phone]  Fax : [phone]

## 2017-09-20 NOTE — PROGRESS NOTES
Problem: Self Care Deficits Care Plan (Adult)  Goal: *Acute Goals and Plan of Care (Insert Text)  1. Ada Farr will be modified independent with functional mobility for ADL in room within 4 - 7 visits. 2. Ada Farr will be modified independent with total body bathing and dressing within 4 - 7 visits. 3. Ada Farr will complete toileting with modified independence within 4 - 7 visits. 4. Ada Farr will complete toilet transfers with modified independence within 7 visits. 5. Ada Farr will participate at least 30 minutes of ADL with 3 or less rest breaks within 7 visits. OCCUPATIONAL THERAPY: Initial Assessment 9/20/2017  INPATIENT: Hospital Day: 6  [place] / [place] / Product Type: Medicare /      NAME/AGE/GENDER: Ada Farr is a 69 y.o. female        PRIMARY DIAGNOSIS:  Lung mass [R91.8] <principal problem not specified> <principal problem not specified>  Procedure(s) (LRB):  ENDOSCOPIC BRONCHOSCOPY ULTRASOUND (EBUS) Xarelto held ELBY aware (N/A)  BRONCHOSCOPY (N/A)     ICD-10: Treatment Diagnosis:        · Generalized Muscle Weakness (M62.81)   Precautions/Allergies:         Review of patient's allergies indicates no known allergies. ASSESSMENT:      Ms. Missy Bermudez presents with findings of cancer (uknown type as of yet) with bony metastasis (presumed). Her work of breathing was significantly increased with oxygen saturation of 98% on room air. Able to stand with contact guard assistance. Lower body activities of daily living limited by abdominal pain and may benefit from adaptive techniques. Ada Farr presents with the following problems and would benefit from occupational therapy to maximize independence with self-care,instrumental activities of daily living, and functional transfers/mobility for activities of daily living/instrumental activities of daily living via the stated goals.       This section established at most recent assessment   PROBLEM LIST (Impairments causing functional limitations):  1. Decreased Strength  2. Decreased ADL/Functional Activities  3. Decreased Transfer Abilities  4. Decreased Balance  5. Increased Pain  6. Decreased Activity Tolerance  7. Decreased Flexibility/Joint Mobility  8. Edema/Girth    INTERVENTIONS PLANNED: (Benefits and precautions of occupational therapy have been discussed with the patient.)  1. Activities of daily living training  2. Therapeutic activity  3. Therapeutic exercise      TREATMENT PLAN: Frequency/Duration: Follow patient 3 times/week to address above goals. Rehabilitation Potential For Stated Goals: GOOD      RECOMMENDED REHABILITATION/EQUIPMENT: (at time of discharge pending progress): Due to the probability of continued deficits (see above) this patient will likely need continued skilled occupational therapy after discharge. Equipment:   · to be determined                      OCCUPATIONAL PROFILE AND HISTORY:   History of Present Injury/Illness (Reason for Referral):  Per MD H & P: Ms. Junior Ayala is a 69 y.o. white female with a history of aortic valve disease s/p  Ross procedure in 1997 with complete AV block post-procedure and pacemaker implantation, paroxysmal atrial fibrillation, stage III chronic kidney disease, type II diabetes, metabolic acidosis, IBS, hypothyroidism s/p total thyroidectomy in 1986, hypertension, hyperlipidemia, vertigo, chronic back pain, GERD, hepatitis A, hysterectomy in 1986, morbid obesity, anemia, osteoarthritis of left knee s/p total knee replacement on 2/28/11, benign left breast lumpectomy and tonsillectomy. She initially presented to PCP on 7/10/2017 reporting a one week history of cold and cough. She stated she had been coughing so much that she had developed rib and back pain. Review of systems and physical exam was consistent with acute bronchitis.  She was prescribed Hycodan and inhaled steroids and sent for chest x-ray to rule out atypical pneumonia. X-ray, completed on 7/12/17, was within normal limits. Patient was seen urgently by her cardiologist, Dr. Sabine Ozuna on 7/19/17 reporting back pain radiating around to her chest worsening with deep breathing, coughing and turning as well as shortness of breath. Echo completed on 8/4/17 showed EF >55%, and cardiac work-up determined that symptoms did not appear to be true cardiac related. Patient requested referral to pulmonology. Repeat chest x-ray was performed on 8/14/17 as well as a nuclear medicine lung ventilation perfusion scan. Chest x-ray showed no acute cardiopulmonary process, and pulmonary perfusion scan was normal with no evidence of acute pulmonary embolism. Of note, patient also underwent bilateral diagnostic screening mammogram on 8/14/17 which showed no suspicious finding in either breast.       Patient was also seen by her nephrologist, Dr. Robert Gallardo in August of 2017 who ordered bone scan was completed on 8/30/17 demonstrating multifocal radiotracer activity concerning for metastatic disease. CT imaging of the chest, abdomen and pelvis was recommended for further assessment to identify potential primary neoplasm. Upon receiving results of bone scan, patient was referred to [facility] for oncology evaluation. CT scan of the chest, abdomen and pelvis are scheduled to be performed on 9/15/17. Past Medical History/Comorbidities:   Ms. Reagan Gil  has a past medical history of Aortic stenosis; AV block, 3rd degree (HCC) (2/13/2016); Cardiac pacemaker; Chest pain; Chronic kidney disease (CKD), stage III (moderate); Chronic pain; Diabetes mellitus, type II ([address] GERD (gastroesophageal reflux disease); Hepatitis A; Hyperlipidemia; Hypertension; Hypertension, essential; hypothyroidism; IBS (irritable bowel syndrome); Morbid obesity ([address] Osteoarthritis of left knee (2/28/2011);  Paroxysmal atrial fibrillation ([address] S/P total knee replacement using cement (2/28/2011); S/P total knee replacement using cement (2/28/2011); and Vertigo, benign positional.  Ms. Surya Vo  has a past surgical history that includes hysterectomy (1986); thyroidectomy (1986); breast lumpectomy (1993); tonsillectomy (as a child); other surgical (1996); cardiac surg procedure unlist (1997); pacemaker (1997 & 2008); and orthopaedic (2/2011). Social History/Living Environment: Lives with  who receives dialysis treatments. Home Environment: Private residence  # Steps to Enter: 1  One/Two Story Residence: Two story, live on 1st floor  Living Alone: No  Support Systems: Spouse/Significant Other/Partner  Patient Expects to be Discharged to[de-identified] Unknown  Current DME Used/Available at Home: Cane, straight  Prior Level of Function/Work/Activity:  Typically independent with ADL. Cultural Preferences:          Yarsanism   Number of Personal Factors/Comorbidities that affect the Plan of Care: Brief history (0):  LOW COMPLEXITY   ASSESSMENT OF OCCUPATIONAL PERFORMANCE[de-identified]   Activities of Daily Living:           Basic ADLs (From Assessment) Complex ADLs (From Assessment)   Basic ADL  Feeding: Supervision  Oral Facial Hygiene/Grooming: Setup  Bathing: Moderate assistance  Upper Body Dressing: Minimum assistance  Lower Body Dressing: Moderate assistance  Toileting: Minimum assistance Instrumental ADL  Meal Preparation: Total assistance  Homemaking: Total assistance   Grooming/Bathing/Dressing Activities of Daily Living     Cognitive Retraining  Safety/Judgement: Awareness of environment                       Bed/Mat Mobility  Supine to Sit: Minimum assistance  Sit to Supine: Moderate assistance  Sit to Stand: Contact guard assistance  Scooting: Minimum assistance          Most Recent Physical Functioning:   Gross Assessment:  AROM: Generally decreased, functional  Strength: Generally decreased, functional               Posture:  Posture (WDL): Exceptions to WDL  Posture Assessment:  Forward head, Rounded shoulders, Trunk flexion  Balance:  Sitting: Intact  Standing: With support; Intact  Standing - Static: Good  Standing - Dynamic : Fair Bed Mobility:  Supine to Sit: Minimum assistance  Sit to Supine: Moderate assistance  Scooting: Minimum assistance  Wheelchair Mobility:     Transfers:  Sit to Stand: Contact guard assistance  Stand to Sit: Contact guard assistance                    Patient Vitals for the past 6 hrs:       BP SpO2 Pulse   17 1049 143/71 98 % 92   17 1520 143/71 98 % 96        Mental Status  Neurologic State: Alert  Orientation Level: Oriented X4  Cognition: Follows commands  Perception: Appears intact  Perseveration: No perseveration noted  Safety/Judgement: Awareness of environment                               Physical Skills Involved:  1. Range of Motion  2. Balance  3. Strength  4. Activity Tolerance  5. Pain (acute) Cognitive Skills Affected (resulting in the inability to perform in a timely and safe manner):  1. Divided Attention Psychosocial Skills Affected:  1. Habits/Routines  2. Social Interaction  3. Social Roles   Number of elements that affect the Plan of Care: 3-5:  MODERATE COMPLEXITY   CLINICAL DECISION MAKIN[address] [place] [id number] AM-PAC 6 Clicks   Daily Activity Inpatient Short Form  How much help from another person does the patient currently need. .. Total A Lot A Little None   1. Putting on and taking off regular lower body clothing?   [ ] 1   [X] 2   [ ] 3   [ ] 4   2. Bathing (including washing, rinsing, drying)? [ ] 1   [X] 2   [ ] 3   [ ] 4   3. Toileting, which includes using toilet, bedpan or urinal?   [ ] 1   [X] 2   [ ] 3   [ ] 4   4. Putting on and taking off regular upper body clothing?   [ ] 1   [ ] 2   [X] 3   [ ] 4   5. Taking care of personal grooming such as brushing teeth? [ ] 1   [ ] 2   [X] 3   [ ] 4   6. Eating meals? [ ] 1   [ ] 2   [X] 3   [ ] 4   © , Trustees of [address] [place] [address] [address] [address], under license to [employer].  All rights reserved    Score:  Initial: 15 Most Recent: X (Date: -- )     Interpretation of Tool:  Represents activities that are increasingly more difficult (i.e. Bed mobility, Transfers, Gait). Score 24 23 22-20 19-15 14-10 9-7 6       Modifier CH CI CJ CK CL CM CN         · Self Care:               - CURRENT STATUS:    CK - 40%-59% impaired, limited or restricted               - GOAL STATUS:           CI - 1%-19% impaired, limited or restricted               - D/C STATUS:                       ---------------To be determined---------------  [place] / [place] / Product Type: Medicare /       Medical Necessity:     · Patient demonstrates good rehab potential due to higher previous functional level. Reason for Services/Other Comments:  · Patient continues to require skilled intervention due to decreased independence with ADL and functional mobility for ADL. Use of outcome tool(s) and clinical judgement create a POC that gives a: MODERATE COMPLEXITY             TREATMENT:   (In addition to Assessment/Re-Assessment sessions the following treatments were rendered)      Pre-treatment Symptoms/Complaints:    Pain: Initial:   Pain Intensity 1: 9  Pain Location 1: Abdomen  Pain Intervention(s) 1: Repositioned, Nurse notified  Post Session:  same      Assessment/Reassessment only, no treatment provided today     Braces/Orthotics/Lines/Etc:   · IV  · O2 Device: Room air  Treatment/Session Assessment:    · Response to Treatment:  No treatment rendered. Assessment only. · Interdisciplinary Collaboration:  · Occupational Therapist  · Registered Nurse  · Certified Nursing Assistant/Patient Care Technician  · After treatment position/precautions:  · Supine in bed  · Bed/Chair-wheels locked  · Bed in low position  · Call light within reach  · RN notified  · Side rails x 2  · Compliance with Program/Exercises: Will assess as treatment progresses.   · Recommendations/Intent for next treatment session: \"Next visit will focus on advancements to more challenging activities\".   Total Treatment Duration:  OT Patient Time In/Time Out  Time In: 9342  Time Out: [address] [place], OTD, JOSEPHR/L

## 2017-09-21 NOTE — PROGRESS NOTES
Ms. Surya Vo is if PT would just come back tomorrow. She really wanted to visit with her son and  before she went for her procedure. Will return tomorrow. It was great that she was at least out of bed.   Marylou Jordan, PT

## 2017-09-21 NOTE — PROGRESS NOTES
[facility]        Inpatient Hematology / Oncology Progress Note      Admission Date: 9/15/2017 10:20 AM  Reason for Admission/Hospital Course: Lung mass [R91.8]      24 Hour Events:  Afebrile, VSS  CT showed lung mass, most likely primary  Pulm planning on bronchoscopy with EBUS for biopsy today  Family at bedside    ROS:  Constitutional: Negative for fever, chills, weakness, malaise, fatigue. CV: Negative for chest pain, palpitations, edema. Respiratory: +dyspnea, +cough. Negative for wheezing. GI: Negative for nausea, abdominal pain, diarrhea. 10 point review of systems is otherwise negative with the exception of the elements mentioned above in the HPI. No Known Allergies    OBJECTIVE:  Patient Vitals for the past 8 hrs:   BP Temp Pulse Resp SpO2 Weight   17 0711 141/68 97.8 °F (36.6 °C) 94 18 96 % -   17 0635 (!) 147/95 97.6 °F (36.4 °C) 94 18 95 % -   17 0329 - - - - - 221 lb (100.2 kg)   17 0303 151/76 97.4 °F (36.3 °C) 93 18 97 % -   17 0024 149/76 98.6 °F (37 °C) 99 22 96 % -     Temp (24hrs), Av.9 °F (36.6 °C), Min:97.2 °F (36.2 °C), Max:98.7 °F (37.1 °C)         Physical Exam:  Constitutional: Well developed, well nourished female in no acute distress, sitting comfortably in the bedside chair. HEENT: Normocephalic and atraumatic. Oropharynx is clear, mucous membranes are moist.  Extraocular muscles are intact. Lymph node   Deferred   Skin Warm and dry. No bruising and no rash noted. No erythema. No pallor. Respiratory Lungs are clear to auscultation bilaterally without wheezes, rales or rhonchi, normal air exchange without accessory muscle use. CVS Normal rate, regular rhythm and normal S1 and S2. No murmurs, gallops, or rubs. Abdomen Soft, nontender and nondistended, normoactive bowel sounds. No palpable mass. No hepatosplenomegaly. Neuro Grossly nonfocal with no obvious sensory or motor deficits.    MSK Normal range of motion in general.  No tenderness. +Generalized edema with 2+ BLE edema   Psych Appropriate mood and affect. Labs:      Recent Labs      09/21/17   0249 09/20/17   0644  09/19/17   0407   WBC  5.7  9.3  5.2   RBC  3.11*  3.43*  3.16*   HGB  9.6*  10.7*  9.7*   HCT  28.4*  32.1*  29.2*   MCV  91.3  93.6  92.4   MCH  30.9  31.2  30.7   MCHC  33.8  33.3  33.2   RDW  16.1*  15.9*  15.9*   PLT  82*  123*  87*   GRANS  63  60  68   LYMPH  21  27  18   MONOS  13*  10  11   EOS  3  3  3   BASOS  0  0  0   IG  0.4  0.3  0.4   DF  AUTOMATED  AUTOMATED  AUTOMATED   ANEU  3.6  5.5  3.5   ABL  1.2  2.5  1.0   ABM  0.7  1.0  0.6   FRANKO  0.1  0.3  0.2   ABB  0.0  0.0  0.0   AIG  0.0  0.0  0.0        Recent Labs      09/21/17 0249 09/20/17   0644  09/19/17   0407   NA  142  145  143   K  3.9  3.5  4.2   CL  111*  117*  114*   CO2  21  17*  18*   AGAP  10  11  11   GLU  107*  90  75   BUN  15  13  18   CREA  1.27*  0.92  1.19*   GFRAA  53*  >60  57*   GFRNA  43*  >60  47*   CA  8.4  7.7*  10.0   SGOT  108*  110*  105*   AP  215*  195*  203*   TP  6.0*  5.5*  5.9*   ALB  2.5*  2.4*  2.6*   GLOB  3.5  3.1  3.3   AGRAT  0.7*  0.8*  0.8*   MG  2.0  1.6*  2.0         Imaging:  XR CHEST SNGL V [[id number]] Collected: 09/15/17 1506      Order Status: Completed Updated: 09/15/17 1500     Narrative:       Single portable upright chest x-ray September 15, 2017    Reference exam: August 14, 2017    INDICATION: PICC line placement    FINDINGS: Right-sided electronic device with median sternotomy clips and wires  are seen with lungs underinflated. There is minimal stranding blurring the  vascular borders, left-sided PICC line tip overlies the upper superior vena cava  at the azygos level.       Impression:       IMPRESSION: Catheter tip overlies the upper superior vena cava.          ASSESSMENT:    Problem List  Date Reviewed: 9/15/2017          Codes Class Noted    Lung mass ICD-10-CM: R91.8  ICD-9-CM: 786.6  9/20/2017 Debility (Chronic) ICD-10-CM: R53.81  ICD-9-CM: 799.3  9/20/2017        Hypercalcemia ICD-10-CM: Y71.03  ICD-9-CM: 275.42  9/15/2017        ILYA (acute kidney injury) [facility]) ICD-10-CM: N17.9  ICD-9-CM: 584.9  9/15/2017        Metastasis to bone of unknown primary [facility]) ICD-10-CM: C79.51, C80.1  ICD-9-CM: 198.5, 199.1  9/15/2017        Controlled type 2 diabetes mellitus without complication, without long-term current use of insulin ([address] ICD-10-CM: E11.9  ICD-9-CM: 250.00  7/18/2017        Comprehensive diabetic foot examination, type 2 DM, encounter for [facility]) ICD-10-CM: E11.9  ICD-9-CM: 250.00  9/95/8864        Metabolic acidosis GERALDINE-43-WL: E87.2  ICD-9-CM: 276.2  6/22/2017        Controlled type 2 diabetes mellitus with complication, without long-term current use of insulin ([facility] ICD-10-CM: E11.8  ICD-9-CM: 250.90  6/14/2017        Aortic valve disease ICD-10-CM: I35.9  ICD-9-CM: 424.1  5/22/2017        Irritable bowel syndrome with both constipation and diarrhea ICD-10-CM: K58.2  ICD-9-CM: 564.1  2/22/2017        Acquired hypothyroidism ICD-10-CM: E03.9  ICD-9-CM: 244.9  12/15/2016        Cardiac pacemaker ICD-10-CM: Z95.0  ICD-9-CM: V45.01  Unknown        Hypertension, essential ICD-10-CM: I10  ICD-9-CM: 401.9  Unknown        Hyperlipidemia ICD-10-CM: E78.5  ICD-9-CM: 272.4  Unknown        Paroxysmal atrial fibrillation (HCC) ICD-10-CM: I48.0  ICD-9-CM: 427.31  Unknown        Chronic kidney disease (CKD), stage III (moderate) ICD-10-CM: N18.3  ICD-9-CM: 149. 3  Unknown    Overview Signed 12/15/2016 10:08 AM by Abner Mcgowan MD     Followed by Dr. Spring Multani             Vertigo, benign positional ICD-10-CM: H81.10  ICD-9-CM: 386.11  Unknown            69 y.o. F consulted for multiple bone metastases of unknown source.  We discussed this is most likely malignancy although SPEP is negative but myeloma actually is usu negative for bone scan, agreed with CT C/A/P, norco prn pain, remeron for anorexia and weight loss, check tumor markers, last colonoscopy done in 2009 and will discuss whether need to repeat. However lab returned showed hypercalcemia and ILYA, called pt back to arrange admission, need aggressive resuscitation IVF, monitor Cr and Nabeel level and add bisphophonate/calcitonin as needed, tumor marker very high for CEA/, highly suspect abd malignancy and arrange CT and biopsy once Cr improved, pain control. PLAN:  Multiple Bone Mets of unknown source  - SPEP neg, CT C/A/P, CEA and CA 19-9 elevated, highly suspect abd malignancy  9/17 Obtain CT and biopsy once Cr improves - possibly tomorrow  9/18 Cr improved to patient's baseline. Get CT C/A/P today. 9/19 CT consistent with primary lung cancer, cirrhosis, choleithiasis  9/20 Needs lung bx to be able to determine treatment options. Pulm consulted. 9/21 Pulm performing bronchoscopy with EBUS for biopsy today. Hold Xarelto. Acute on CKD  - Aggressive IVF  9/16 Cr improved to 1.69 from 2.23 yesterday. Con't aggressive IVF. 9/17 Cr down to 1.34. Con't IVF. 9/18 Cr continues to improve. Cr down to 1.20.  9/21 Cr up to 1.27. Con't IVF. Hypercalcemia  - IVF, add Aredia/calcitonin as needed  9/16 Corrected calcium down to 12.48 from 14.26 yesterday. Con't aggressive IVF. 9/17 Ca continues to trend down. Con't IVF. Give Aredia. 9/18 Rosey Ca 12.66, slightly up today. Aredia given yesterday. Start Calcitonin today. 9/19 Corrected Ca 11.12. Continue calcitonin  RESOLVED    Electrolyte Imbalance  - Replete prn per Cyndi SOPs    Malodorous urine  9/16 Check UA, UCx.  UA with moderate leukocytes - start Rocephin and await UCx results  9/17 Preliminary UCx results with mixed skin cherise. Con't Rocephin until final report - if remains negative, will DC Rocephin. 9/18 UCx with mixed skin cherise. DC Rocephin. Weakness  9/17 Consult PT  9/20 Consult OT    Dyspnea/Fluid overload  9/18 Pt appears more SOB today most likely r/t to fluid overload.   Has been receiving aggressive IVF. Also 12# wt gain noted from yesterday. Give lasix x 1.  9/19 Weight up again today-continue with lasix. 9/20 Continue daily Lasix      Disposition:  Awaiting SNF placement. Continue home meds  Cyndi SOPs  DVT prophylaxis: On Xarelto            Laureen Rodríguez NP   [facility]  [address] [place] [address]  Office : [phone]  Fax : [phone]         Attending Addendum:  I personally evaluated the patient with Laureen Rodríguez, N.P.,  and agree with the assessment, findings and plan as documented. Appears stable, she will undergo a biopsy of her lung mass today.               Zofia Guidry MD  [facility]  [id number] [place] [address]  Office : [phone]  Fax : [phone]

## 2017-09-21 NOTE — PROGRESS NOTES
END OF SHIFT NOTE:    Intake/Output  09/20 1901 - 09/21 0700  In: -   Out: 200 [Urine:200]   Voiding: YES  Catheter: NO  Drain:              Stool:  0 occurrences. Stool Assessment  Stool Color: Brown (09/19/17 2332)  Stool Appearance: Loose; Soft (09/19/17 2332)  Stool Amount: Small (09/19/17 2332)  Stool Source/Status: Rectum (09/19/17 2332)    Emesis:  0 occurrences. VITAL SIGNS  Patient Vitals for the past 12 hrs:   Temp Pulse Resp BP SpO2   09/21/17 0303 97.4 °F (36.3 °C) 93 18 151/76 97 %   09/21/17 0024 98.6 °F (37 °C) 99 22 149/76 96 %   09/20/17 1958 97.9 °F (36.6 °C) 95 20 152/74 95 %       Pain Assessment  Pain 1  Pain Scale 1: Numeric (0 - 10) (09/21/17 0303)  Pain Intensity 1: 0 (09/21/17 0303)  Patient Stated Pain Goal: 0 (09/21/17 0303)  Pain Reassessment 1: Patient sleeping (09/21/17 0130)  Pain Onset 1: pta (09/20/17 0734)  Pain Location 1: Back (09/21/17 0039)  Pain Orientation 1: Upper (upperback) (09/20/17 1607)  Pain Description 1: Aching (09/21/17 0039)  Pain Intervention(s) 1: Medication (see MAR) (09/21/17 0039)    Ambulating  Yes    Additional Information:   Acquired care of this patient at 0100, pt rested quietly during the time this RN had her. Pt was up with assist to bathroom x1, SOB on exertion, tolerated fairly. Shift report given to oncoming nurse at the bedside.     Jeremy Soliman RN

## 2017-09-21 NOTE — PROGRESS NOTES
Pt is sitting up in bed. Pt is alert and oriented. No needs are voiced. No SOB or pain is reported at this time. No distress are voiced. Call light is within reach. Will continue to monitor.

## 2017-09-21 NOTE — PROGRESS NOTES
Christa Schaumann  Admission Date: 9/15/2017             Daily Progress Note: 9/21/2017     Patient is a 69 y.o.  female presents with abnormal Chest CT from 9/15.     She saw Hem/onc on 9/15 per referal from Dr Viry Carlin for bone pain and to evaluate for metastatic disease. She had a  CXR on 9/15 followed by Chest CT on 9/18 showing LLL lung mass with adenopathy. There were multiple bony lesions in the thoracic spine noted. She is a lifelong never smoker. We were asked to see for lung mass biopsy.       She has underlying CAD and Atrial fibrillation on xarelto 20 mg daily. She is not hypoxic and is tolerating Room air. She has never smoked. She has declined significantly over the past 2 months and more so over the past 4 weeks. She can barely walk to the bathroom now.  She has lost over 40 pounds, unintentionally.        Subjective:     No events overnight- seen prior to bronchoscopy    Current Facility-Administered Medications   Medication Dose Route Frequency    furosemide (LASIX) injection 40 mg  40 mg IntraVENous DAILY    HYDROcodone-acetaminophen (NORCO) 5-325 mg per tablet 1 Tab  1 Tab Oral Q4H PRN    magnesium oxide (MAG-OX) tablet 400 mg  400 mg Oral TID    docusate sodium (COLACE) capsule 100 mg  100 mg Oral DAILY    polyethylene glycol (MIRALAX) packet 17 g  17 g Oral DAILY PRN    glyBURIDE (DIABETA) tablet 1.25 mg  1.25 mg Oral BID WITH MEALS    mirtazapine (REMERON) tablet 7.5 mg  7.5 mg Oral QHS    pantoprazole (PROTONIX) tablet 40 mg  40 mg Oral ACB    levothyroxine (SYNTHROID) tablet 125 mcg  125 mcg Oral ACB    ondansetron (ZOFRAN ODT) tablet 8 mg  8 mg Oral Q8H PRN    ondansetron (ZOFRAN) injection 4 mg  4 mg IntraVENous Q4H PRN    morphine injection 2 mg  2 mg IntraVENous Q4H PRN    sodium chloride (NS) flush 20 mL  20 mL InterCATHeter Q8H    heparin (porcine) pf 600 Units  600 Units InterCATHeter Q8H    sodium chloride (NS) flush 20 mL  20 mL InterCATHeter PRN    heparin (porcine) pf 600 Units  600 Units InterCATHeter PRN    amLODIPine (NORVASC) tablet 5 mg  5 mg Oral DAILY    valsartan (DIOVAN) tablet 320 mg  320 mg Oral DAILY         Objective:     Vitals:    09/21/17 0329 09/21/17 0635 09/21/17 0711 09/21/17 1050   BP:  (!) 147/95 141/68 142/64   Pulse:  94 94 94   Resp:  18 18 18   Temp:  97.6 °F (36.4 °C) 97.8 °F (36.6 °C) 97.3 °F (36.3 °C)   SpO2:  95% 96% 97%   Weight: 221 lb (100.2 kg)        Intake and Output:   09/19 1901 - 09/21 0700  In: 740 [P.O.:740]  Out: 2275 [Urine:2275]  09/21 0701 - 09/21 1900  In: -   Out: 650 [Urine:650]    Physical Exam:          GEN: well developed and in no acute distress, Oxygen per 2L  HEENT:  PERRL, EOMI, no alar flaring or epistaxis, oral mucosa moist without cyanosis,   NECK:  no JVD, no retractions, no thyromegaly or masses,   LUNGS:  CTA  HEART:  RRR with no M,G,R;  ABDOMEN:  soft with no tenderness; positive bowel sounds present  EXTREMITIES:  warm with no cyanosis, trace lower leg edema  SKIN:  no jaundice or ecchymosis   NEURO:  alert and oriented, grossly non-focal         LAB  Recent Labs      09/21/17   0249  09/20/17   0644  09/19/17   0407   WBC  5.7  9.3  5.2   HGB  9.6*  10.7*  9.7*   HCT  28.4*  32.1*  29.2*   PLT  82*  123*  87*     Recent Labs      09/21/17   0249  09/20/17   0644  09/19/17   0407   NA  142  145  143   K  3.9  3.5  4.2   CL  111*  117*  114*   CO2  21  17*  18*   GLU  107*  90  75   BUN  15  13  18   CREA  1.27*  0.92  1.19*   MG  2.0  1.6*  2.0     No results for input(s): PH, PCO2, PO2, HCO3 in the last 72 hours. No results for input(s): LCAD, LAC in the last 72 hours.   Assessment:     Patient Active Problem List   Diagnosis Code    Cardiac pacemaker Z95.0    Hypertension, essential I10    Hyperlipidemia E78.5    Paroxysmal atrial fibrillation (HCC) I48.0    Chronic kidney disease (CKD), stage III (moderate) N18.3    Vertigo, benign positional H81.10    Acquired hypothyroidism E03.9    Irritable bowel syndrome with both constipation and diarrhea K58.2    Aortic valve disease I35.9    Controlled type 2 diabetes mellitus with complication, without long-term current use of insulin (HCC) A12.6    Metabolic acidosis C07.0    Controlled type 2 diabetes mellitus without complication, without long-term current use of insulin (HCC) E11.9    Comprehensive diabetic foot examination, type 2 DM, encounter for ([facility] E11.9    Hypercalcemia E83.52    ILYA (acute kidney injury) ([facility] N17.9    Metastasis to bone of unknown primary ([facility] C79.51, C80.1    Lung mass R91.8    Debility R53.81       Plan     Hospital Problems  Date Reviewed: 9/15/2017          Codes Class Noted POA    Lung mass ICD-10-CM: R91.8  ICD-9-CM: 786.6  9/20/2017 Yes    For EBUS biopsy for diagnosis today.     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 Flavio Melton MD

## 2017-09-21 NOTE — PROCEDURES
PROCEDURE  Bronchoscopy with Endobronchial Ultrasound Guided Fine Needle Aspiration Of Medistinal Lymph nodes and airway inspection. INDICATION   Diagnosis of Mediastinal Lymphadenopathy/Staging of Lung Cancer      POST OP DIAGNOSIS:  Station 4L was biopsied and positive for malignancy on MEMO. Based on CT  and EBUS and bone scan and the presence of a liver lesion pt is a STAGE 4 non-small cell cancer or extensive disease small cell cancer. Tissue has been obtained for immunohistochemical staining to identify the type of cancer we are dealing with. ANESTHESIA  Concious sedation with: Fentanyl 100 mcg IV; Versed 2 mg IV; Lidocaine 200 mg to tracheo-bronchial tree and vocal cords;     AIRWAY INSPECTION  After obtaining informed consent, using a bite block, an Olympus Q 180 viedeo bronchoscope was  introduced into the trachea through the vocal cords without complications. RIGHT  LOCATION NORM/ABNORMAL DESCRIPTION   VOCAL CORDS NL    TRACHEA NL    CEASAR NL    RMSB NL    RUL NL    BI NL    RML NL    SUP SEGM RLL NL    MED BASAL NL    ANTERIOR BASAL NL    LATERAL BASAL NL    POSTERIOR BASAL NL                                               LEFT  LOCATION NORMAL/ABNORMAL TYPE   LMSB NL    SAMUEL NL    LINGULA NL    SUPERIOR DIVISION NL    SUPERIOR SEG LLL NL    UNIQUE-MEDIAL LLL   lower lobe bronchi were extrinsically compressed most likely from tumor, no obvious endobronchial tumor was noted. LATERAL LLL     POSTERIOR LLL                          EBUS  After completing the airway inspection an Olympus  F EBUS bronchoscope was introduced into the trachea through the vocal chords without complication.   The balloon was inflated with saline and a mediastinal inspection commenced:      STATION SIZE IN CM   4L  then 2 cm         After identifying targets the following samples were obtained:    STATION PASS# LYMPHOCYTES MALIGNANT ATYPICAL GRANULOMA NONDGNSTC   4 L 1 - - suspicious -     2 - ++++ - - 3 -- -- -- -- in toto for IHC and cell block    4 -- -- -- -- \"    5 -- -- -- -- \"    6 -- -- -- -- \"    7 -- -- -- -- \"    8 -- -- -- -- \"    9 -- -- -- -- \"    10 -- -- -- -- \"                  The procedure was completed without complication and the patient tolerated the procedure well.     EBL: 2 ml    Miladys Leahy MD.

## 2017-09-21 NOTE — H&P (VIEW-ONLY)
PULMONARY/CCM CONSULT :  9/20/2017    Date of Admission:  9/15/2017    The patient's chart has been reviewed and the chart has been discussed with nursing staff. Subjective: This patient has been seen and evaluated at the request of Dr. Trena Kwok. Patient is a 69 y.o.  female presents with abnormal Chest CT from 9/15. She saw Hem/onc on 9/15 per referal from Dr Glenda Garcia for bone pain and to evaluate for metastatic disease. She had a  CXR on 9/15 followed by Chest CT on 9/18 showing LLL lung mass with adenopathy. There were multiple bony lesions in the thoracic spine noted. She is a lifelong never smoker. We were asked to see for lung mass biopsy. She has underlying CAD and Atrial fibrillation on xarelto 20 mg daily. She is not hypoxic and is tolerating Room air. She has never smoked. She has declined significantly over the past 2 months and more so over the past 4 weeks. She can barely walk to the bathroom now. She has lost over 40 pounds, unintentionally.        Past Medical History:   Diagnosis Date    Aortic stenosis     AVR 1997    AV block, 3rd degree (HCC) 2/13/2016    Cardiac pacemaker     Chest pain     Chronic kidney disease (CKD), stage III (moderate)     Chronic pain      lower back    Diabetes mellitus, type II (HCC)     GERD (gastroesophageal reflux disease)     controlled with medication    Hepatitis A     dx after hysterectomy 1986 - pt states no problems now    Hyperlipidemia     Hypertension     controlled with medication    Hypertension, essential     hypothyroidism     hx of total thyroidectomy in 1986    IBS (irritable bowel syndrome)     pt states has hx of diverticulitis     Morbid obesity (HCC)     bmi=40    Osteoarthritis of left knee 2/28/2011    Paroxysmal atrial fibrillation (HCC)     S/P total knee replacement using cement 2/28/2011    S/P total knee replacement using cement 2/28/2011    Vertigo, benign positional       Past Surgical History: Procedure Laterality Date    CARDIAC SURG PROCEDURE UNLIST  1997    mitral valve replacement \"Ross Procedure\" per pt    HX BREAST LUMPECTOMY  1993    left breast - benign per pt    HX HYSTERECTOMY  1986    HX ORTHOPAEDIC  2/2011    left knee replacement    HX OTHER SURGICAL  1996    basal cell carcinoma removed from face   [address] & [address]    pacemaker placed in 1997 due to \"complete heart block\" after mitral valve replacement - 100 % dependant on pacemaker    HX TONSILLECTOMY  as a child    THYROIDECTOMY  1986    total      Social History   Substance Use Topics    Smoking status: Never Smoker    Smokeless tobacco: Never Used    Alcohol use No      Family History   Problem Relation Age of Onset    Cancer Mother      breast    Breast Cancer Mother     Heart Attack Father     Heart Failure Father     Kidney Disease Father     Cancer Maternal Aunt      Breast CA    Breast Cancer Maternal Aunt     Cancer Maternal Grandmother      Breast CA    Breast Cancer Maternal Grandmother     Heart Disease Maternal Aunt       No Known Allergies   Prior to Admission Medications   Prescriptions Last Dose Informant Patient Reported? Taking? B.infantis-B.ani-B.long-B.bifi (PROBIOTIC 4X) 10-15 mg TbEC 9/15/2017 at Unknown time  Yes Yes   Sig: Take  by mouth daily. Calcium Carbonate-Vit D3-Min (CALCIUM-VITAMIN D) 600-400 mg-unit Tab 9/8/2017 at Unknown time  Yes Yes   Sig: Take 2 Tabs by mouth daily. HYDROcodone-acetaminophen (NORCO) 5-325 mg per tablet 9/15/2017 at Unknown time  No Yes   Sig: Take 1 Tab by mouth every six (6) hours as needed for Pain. Max Daily Amount: 4 Tabs. SYNTHROID 125 mcg tablet 9/15/2017 at Unknown time  No Yes   Sig: Take 1 Tab by mouth Daily (before breakfast). MARCIO   amLODIPine (NORVASC) 5 mg tablet 9/15/2017 at Unknown time  No Yes   Sig: Take 1 Tab by mouth daily. Patient taking differently: Take 2.5 mg by mouth daily.    azelastine (ASTELIN) 137 mcg (0.1 %) nasal spray 9/15/2017 at Unknown time  No Yes   [place] by Both Nostrils route two (2) times a day. Use in each nostril as directed   cyclobenzaprine (FLEXERIL) 5 mg tablet 2017 at Unknown time  Yes Yes   Sig: Take 5 mg by mouth.   glyBURIDE (DIABETA) 2.5 mg tablet 2017 at Unknown time  Yes Yes   Sig: Take 1.25 mg by mouth two (2) times daily (with meals). metFORMIN (GLUMETZA ER) 500 mg TG24 24 hour tablet 9/15/2017 at Unknown time  Yes Yes   Sig: Take  by mouth daily. mirtazapine (REMERON) 7.5 mg tablet 2017 at Unknown time  No Yes   Sig: Take 1 Tab by mouth nightly. multivitamin (ONE A DAY) tablet 9/15/2017 at Unknown time  Yes Yes   Sig: Strength: Multiple Vitamins; Form: capsule; SIG: take 1 cap(s) orally once a day   omega-3 fatty acids-vitamin e (FISH OIL) 1,000 mg Cap 9/15/2017 at Unknown time  Yes Yes   Sig: Take 1 Cap by mouth every morning. Stopped 4/15/12   omeprazole (PRILOSEC) 20 mg capsule 9/15/2017 at Unknown time  Yes Yes   Sig: Take 20 mg by mouth daily. rivaroxaban (XARELTO) 20 mg tab tablet 2017 at Unknown time  No Yes   Sig: Take 1 Tab by mouth daily (with dinner). valsartan-hydroCHLOROthiazide (DIOVAN-HCT) 320-25 mg per tablet 9/15/2017 at Unknown time  No Yes   Sig: Take 1 Tab by mouth daily. vitamin B complex (VITAMINS B COMPLEX) capsule 9/15/2017 at Unknown time  Yes Yes   Sig: Strength: Vitamin B Complex;  Form: capsule; SIG: take 1 cap(s) orally twice a day      Facility-Administered Medications: None       MEDS SCHEDULED:    Current Facility-Administered Medications   Medication Dose Route Frequency    furosemide (LASIX) injection 40 mg  40 mg IntraVENous DAILY    HYDROcodone-acetaminophen (NORCO) 5-325 mg per tablet 1 Tab  1 Tab Oral Q4H PRN    magnesium oxide (MAG-OX) tablet 400 mg  400 mg Oral TID    docusate sodium (COLACE) capsule 100 mg  100 mg Oral DAILY    polyethylene glycol (MIRALAX) packet 17 g  17 g Oral DAILY PRN    glyBURIDE (DIABETA) tablet 1.25 mg  1.25 mg Oral BID WITH MEALS    mirtazapine (REMERON) tablet 7.5 mg  7.5 mg Oral QHS    pantoprazole (PROTONIX) tablet 40 mg  40 mg Oral ACB    levothyroxine (SYNTHROID) tablet 125 mcg  125 mcg Oral ACB    ondansetron (ZOFRAN ODT) tablet 8 mg  8 mg Oral Q8H PRN    ondansetron (ZOFRAN) injection 4 mg  4 mg IntraVENous Q4H PRN    morphine injection 2 mg  2 mg IntraVENous Q4H PRN    sodium chloride (NS) flush 20 mL  20 mL InterCATHeter Q8H    heparin (porcine) pf 600 Units  600 Units InterCATHeter Q8H    sodium chloride (NS) flush 20 mL  20 mL InterCATHeter PRN    heparin (porcine) pf 600 Units  600 Units InterCATHeter PRN    amLODIPine (NORVASC) tablet 5 mg  5 mg Oral DAILY    valsartan (DIOVAN) tablet 320 mg  320 mg Oral DAILY         Review of Systems  Constitutional: positive for fatigue  Respiratory: positive for dyspnea on exertion  Cardiovascular: negative for chest pain, chest pressure/discomfort, palpitations, irregular heart beats, near-syncope, syncope  Musculoskeletal:positive for debility    Objective:     Vitals:    09/19/17 2331 09/20/17 0514 09/20/17 0655 09/20/17 1049   BP: 133/64 153/85 155/77 143/71   Pulse: 97 (!) 110 (!) 101 92   Resp: 20 18 18 18   Temp: 98.2 °F (36.8 °C) 97.5 °F (36.4 °C) 97.5 °F (36.4 °C) 97.2 °F (36.2 °C)   SpO2: 96% 94% 96% 98%   Weight:  225 lb 1.6 oz (102.1 kg)       09/20 0701 - 09/20 1900  In: 490 [P.O.:490]  Out: 350 [Urine:350]  09/18 1901 - 09/20 0700  In: 3600 [P.O.:600; I.V.:3000]  Out: 1350 [Urine:1350]      PHYSICAL EXAM     Physical Exam:   General:  Alert,obese   Eyes:  Conjunctivae/corneas clear. Nose: Nares patent and moist. Septum midline. Mouth/Throat: Lips, mucosa, and tongue pink and intact. Neck: Supple, symmetrical.   Respiratory:   Increased work of breathing, clear to auscultation bilaterally on on RA   Cardiovascular:  Regular rate and rhythm, S1, S2, no murmur, click, rub or gallop.   Telemetry monitor:NSR   GI: Abdomen soft, non-tender. Bowel sounds active X 4 Q. No masses,     Musculoskeletal: Extremities symmetrical, atraumatic, no cyanosis, *edema. Pulses: 2+ and symmetric all extremities. Skin: Skin color, texture, turgor normal. No rashes or lesions       Neurologic: 2+ strength bilateral upper and lower extremities, sensation throughout appropriate. Alert and oriented. Activity:limited    Nutrition:ADA        LABS    Recent Labs      09/20/17   0644  09/19/17   0407  09/18/17   0341   WBC  9.3  5.2  6.1   HGB  10.7*  9.7*  10.3*   HCT  32.1*  29.2*  30.8*   PLT  123*  87*  98*     Recent Labs      09/20/17 0644  09/19/17   0407  09/18/17   0341   NA  145  143  141   K  3.5  4.2  4.1   CL  117*  114*  113*   GLU  90  75  100   CO2  17*  18*  19*   BUN  13  18  19   CREA  0.92  1.19*  1.20*   MG  1.6*  2.0  1.5*       CT of the Chest, Abdomen, and Pelvis     INDICATION:  Metastatic bone disease, unknown primary     Multiple axial images were obtained through the chest, abdomen, and pelvis after  intravenous injection of 125cc of optiray 350. Radiation dose reduction  techniques were used for this study: All CT scans performed at this facility  use one or all of the following: Automated exposure control, adjustment of the  mA and/or kVp according to patient's size, iterative reconstruction.     FINDINGS:  Chest CT: There is an oval 4.5 x 3.1 cm left infrahilar lung mass consistent  with a primary lung tumor. No other significant pulmonary masses are seen. There is a small left pleural effusion. There is a 17 mm left hilar lymph node. There is a 13 mm subcarinal lymph node. There is also a 3.6 x 2.1 cm lymph  node 4L node at the level of jevon. There are several small lytic lesions in  the thoracic spine. There are also several nondisplaced left rib fractures.     Abdomen CT: Liver has an enlarged cirrhotic appearance, 25 cm in length.   There  several small low-attenuation liver lesions, probably cysts.  There is  cholelithiasis, no bile duct dilatation. The adrenal glands and pancreas appear  normal.  There is normal enhancement of the kidneys. There is no hydronephrosis. There is no adenopathy. There is a small amount of perihepatic fluid. There  are multiple small lytic lesions in the lumbar spine.     Pelvis CT: There is also free fluid in the pelvis. There is no significant  pelvic adenopathy. There is a fracture of the superior aspect of the right  ilium, most likely a pathologic fracture. Sigmoid diverticulosis is  incidentally noted.     IMPRESSION  IMPRESSION:    1. Left lower lobe lung mass consistent with primary lung cancer. Associated  left hilar and mediastinal adenopathy. 2.  Multiple bony metastatic lesions in the thoracic and lumbar spine. 3.  Cirrhosis. 4.  Cholelithiasis. Assessment:     Hospital Problems  Date Reviewed: 9/15/2017          Codes Class Noted POA    Lung mass ICD-10-CM: R91.8  ICD-9-CM: 786.6  9/20/2017 Unknown    She has never smoked    Hypercalcemia ICD-10-CM: E83.52  ICD-9-CM: 275.42  9/15/2017 Yes        ILYA (acute kidney injury) [facility]) ICD-10-CM: N17.9  ICD-9-CM: 584.9  9/15/2017 Yes        Metastasis to bone of unknown primary [facility]) ICD-10-CM: C79.51, C80.1  ICD-9-CM: 198.5, 199.1  9/15/2017 Yes              Plan:     Bronchoscopy with EBUS tomorrow with biopsy  Stop Xarelto  NPO after MN    [employer], NP-C    More than 50% of time documented was spent in face-to-face contact with the patient and in the care of the patient on the floor/unit where the patient is located. The patient has been seen and examined by me personally, the chart,labs, and radiographic studies have been reviewed. Chest: CTA  Extremities: trace edema    I agree with the above assessment and plan.     Lore Coronel MD.

## 2017-09-21 NOTE — PROGRESS NOTES
TRANSFER - IN REPORT:    Verbal report received from [address] [place] [address], RT(name) on Dawson Mcbride  being received from Bronch lab(unit) for routine progression of care      Report consisted of patients Situation, Background, Assessment and   Recommendations(SBAR). Information from the following report(s) SBAR, Procedure Summary and Recent Results was reviewed with the receiving nurse. Opportunity for questions and clarification was provided. Assessment completed upon patients arrival to unit and care assumed.

## 2017-09-21 NOTE — INTERVAL H&P NOTE
H&P Update:  Ada Farr was seen and examined. History and physical has been reviewed. The patient has been examined.  There have been no significant clinical changes since the completion of the originally dated History and Physical.    Signed By: Lore Coronel MD     September 21, 2017 2:15 PM

## 2017-09-21 NOTE — PROGRESS NOTES
TRANSFER - OUT REPORT:    Verbal report given to [employer] on [employer]  being transferred to [facility] [phone] for routine post - op bronchoscopy and EBUS. Report consisted of patients Situation, Background, Assessment and   Recommendations(SBAR). Information from the following report(s) SBAR, Procedure Summary and Recent Results was reviewed with the receiving nurse. Lines:   PICC Double Lumen 49/00/04 Left;Basilic (Active)   Central Line Being Utilized Yes 9/21/2017  7:10 AM   Criteria for Appropriate Use Limited/no vessel suitable for conventional peripheral access 9/21/2017  7:10 AM   Site Assessment Clean, dry, & intact 9/21/2017  7:10 AM   Phlebitis Assessment 0 9/21/2017  7:10 AM   Infiltration Assessment 0 9/21/2017  7:10 AM   Arm Circumference (cm) 41 cm 9/15/2017  2:00 PM   Date of Last Dressing Change 09/20/17 9/21/2017  7:10 AM   Dressing Status Clean, dry, & intact 9/21/2017  7:10 AM   Action Taken Blood drawn 9/21/2017  2:51 AM   External Catheter Length (cm) 2 centimeters 9/15/2017  5:28 PM   Dressing Type Disk with Chlorhexadine gluconate (CHG); Transparent 9/21/2017  7:10 AM   Hub Color/Line Status Purple;Flushed;Patent 9/21/2017  7:10 AM   Positive Blood Return (Site #1) Yes 9/21/2017  7:10 AM   Hub Color/Line Status Red;Flushed;Patent 9/21/2017  7:10 AM   Positive Blood Return (Site #2) Yes 9/21/2017  7:10 AM   Alcohol Cap Used No 9/21/2017  7:10 AM     Opportunity for questions and clarification was provided.       Patient transported with:   O2 @ 2 liters

## 2017-09-21 NOTE — ROUTINE PROCESS
TRANSFER - IN REPORT:    [facility] [employer]  being received from [phone] for ordered procedure. Report consisted of patients Situation, Background, Assessment and   Recommendations(SBAR). Information from the following report(s) SBAR, Intake/Output and MAR was reviewed with the receiving RRT. Opportunity for questions and clarification was provided.

## 2017-09-21 NOTE — INTERVAL H&P NOTE
H&P Update:  Jose Juan Trent was seen and examined. History and physical has been reviewed. The patient has been examined.  There have been no significant clinical changes since the completion of the originally dated History and Physical.    Signed By: Miladys Leahy MD     September 21, 2017 11:30 AM

## 2017-09-22 PROBLEM — E11.9 CONTROLLED TYPE 2 DIABETES MELLITUS WITHOUT COMPLICATION, WITHOUT LONG-TERM CURRENT USE OF INSULIN (HCC): Chronic | Status: ACTIVE | Noted: 2017-01-01

## 2017-09-22 PROBLEM — C34.32 MALIGNANT NEOPLASM OF LOWER LOBE OF LEFT LUNG (HCC): Status: ACTIVE | Noted: 2017-01-01

## 2017-09-22 NOTE — PROGRESS NOTES
[facility]        Inpatient Hematology / Oncology Progress Note      Admission Date: 9/15/2017 10:20 AM  Reason for Admission/Hospital Course: Lung mass [R91.8]      24 Hour Events:  Afebrile, VSS  CT showed lung mass, most likely primary  Lung mass bx on  - awaiting path  Discharge pending rehab placement    ROS:  Constitutional: Negative for fever, chills, weakness, malaise, fatigue. CV: Negative for chest pain, palpitations, edema. Respiratory: +dyspnea, +cough. Negative for wheezing. GI: Negative for nausea, abdominal pain, diarrhea. 10 point review of systems is otherwise negative with the exception of the elements mentioned above in the HPI. No Known Allergies    OBJECTIVE:  Patient Vitals for the past 8 hrs:   BP Temp Pulse Resp SpO2 Weight   17 0736 129/68 98.7 °F (37.1 °C) 91 18 98 % -   17 0455 145/70 98.6 °F (37 °C) 94 18 94 % 206 lb (93.4 kg)     Temp (24hrs), Av.3 °F (36.8 °C), Min:97.3 °F (36.3 °C), Max:98.7 °F (37.1 °C)         Physical Exam:  Constitutional: Well developed, well nourished female in no acute distress, lying comfortably in the hospital bed. HEENT: Normocephalic and atraumatic. Oropharynx is clear, mucous membranes are moist.  Extraocular muscles are intact. Lymph node   Deferred   Skin Warm and dry. No bruising and no rash noted. No erythema. No pallor. Respiratory Lungs are clear to auscultation bilaterally without wheezes, rales or rhonchi, normal air exchange without accessory muscle use. CVS Normal rate, regular rhythm and normal S1 and S2. No murmurs, gallops, or rubs. Abdomen Soft, nontender and nondistended, normoactive bowel sounds. No palpable mass. No hepatosplenomegaly. Neuro Grossly nonfocal with no obvious sensory or motor deficits. MSK Normal range of motion in general.  No tenderness. +Generalized edema with 2+ BLE edema   Psych Appropriate mood and affect.         Labs:      Recent Labs 09/21/17   0249  09/20/17   0644   WBC  5.7  9.3   RBC  3.11*  3.43*   HGB  9.6*  10.7*   HCT  28.4*  32.1*   MCV  91.3  93.6   MCH  30.9  31.2   MCHC  33.8  33.3   RDW  16.1*  15.9*   PLT  82*  123*   GRANS  63  60   LYMPH  21  27   MONOS  13*  10   EOS  3  3   BASOS  0  0   IG  0.4  0.3   DF  AUTOMATED  AUTOMATED   ANEU  3.6  5.5   ABL  1.2  2.5   ABM  0.7  1.0   FRANKO  0.1  0.3   ABB  0.0  0.0   AIG  0.0  0.0        Recent Labs      09/22/17   0506  09/21/17   0249  09/20/17   0644   NA  141  142  145   K  3.9  3.9  3.5   CL  110*  111*  117*   CO2  21  21  17*   AGAP  10  10  11   GLU  169*  107*  90   BUN  18  15  13   CREA  1.30*  1.27*  0.92   GFRAA  51*  53*  >60   GFRNA  42*  43*  >60   CA  8.4  8.4  7.7*   SGOT  100*  108*  110*   AP  239*  215*  195*   TP  6.4  6.0*  5.5*   ALB  2.6*  2.5*  2.4*   GLOB  3.8*  3.5  3.1   AGRAT  0.7*  0.7*  0.8*   MG   --   2.0  1.6*         Imaging:  XR CHEST SNGL V [[id number]] Collected: 09/15/17 1506      Order Status: Completed Updated: 09/15/17 1509     Narrative:       Single portable upright chest x-ray September 15, 2017    Reference exam: August 14, 2017    INDICATION: PICC line placement    FINDINGS: Right-sided electronic device with median sternotomy clips and wires  are seen with lungs underinflated. There is minimal stranding blurring the  vascular borders, left-sided PICC line tip overlies the upper superior vena cava  at the azygos level.       Impression:       IMPRESSION: Catheter tip overlies the upper superior vena cava.          ASSESSMENT:    Problem List  Date Reviewed: 9/15/2017          Codes Class Noted    Lung mass ICD-10-CM: R91.8  ICD-9-CM: 786.6  9/20/2017        Debility (Chronic) ICD-10-CM: R53.81  ICD-9-CM: 799.3  9/20/2017        Hypercalcemia ICD-10-CM: N21.55  ICD-9-CM: 275.42  9/15/2017        ILYA (acute kidney injury) [facility]) ICD-10-CM: N17.9  ICD-9-CM: 584.9  9/15/2017        Metastasis to bone of unknown primary ([facility] ICD-10-CM: C79.51, C80.1  ICD-9-CM: 198.5, 199.1  9/15/2017        Controlled type 2 diabetes mellitus without complication, without long-term current use of insulin ([facility] ICD-10-CM: E11.9  ICD-9-CM: 250.00  7/18/2017        Comprehensive diabetic foot examination, type 2 DM, encounter for [facility]) ICD-10-CM: E11.9  ICD-9-CM: 250.00  8/74/8347        Metabolic acidosis ZNU-00-RL: E87.2  ICD-9-CM: 276.2  6/22/2017        Controlled type 2 diabetes mellitus with complication, without long-term current use of insulin ([facility] ICD-10-CM: E11.8  ICD-9-CM: 250.90  6/14/2017        Aortic valve disease ICD-10-CM: I35.9  ICD-9-CM: 424.1  5/22/2017        Irritable bowel syndrome with both constipation and diarrhea ICD-10-CM: K58.2  ICD-9-CM: 564.1  2/22/2017        Acquired hypothyroidism ICD-10-CM: E03.9  ICD-9-CM: 244.9  12/15/2016        Cardiac pacemaker ICD-10-CM: Z95.0  ICD-9-CM: V45.01  Unknown        Hypertension, essential ICD-10-CM: I10  ICD-9-CM: 401.9  Unknown        Hyperlipidemia ICD-10-CM: E78.5  ICD-9-CM: 272.4  Unknown        Paroxysmal atrial fibrillation (HCC) ICD-10-CM: I48.0  ICD-9-CM: 427.31  Unknown        Chronic kidney disease (CKD), stage III (moderate) ICD-10-CM: N18.3  ICD-9-CM: 163. 3  Unknown    Overview Signed 12/15/2016 10:08 AM by Mendel Liter, MD     Followed by Dr. Gogo Ferraro             Vertigo, benign positional ICD-10-CM: H81.10  ICD-9-CM: 386.11  Unknown            69 y.o. F consulted for multiple bone metastases of unknown source. We discussed this is most likely malignancy although SPEP is negative but myeloma actually is usu negative for bone scan, agreed with CT C/A/P, norco prn pain, remeron for anorexia and weight loss, check tumor markers, last colonoscopy done in 2009 and will discuss whether need to repeat.  However lab returned showed hypercalcemia and ILYA, called pt back to arrange admission, need aggressive resuscitation IVF, monitor Cr and Nabeel level and add bisphophonate/calcitonin as needed, tumor marker very high for CEA/, highly suspect abd malignancy and arrange CT and biopsy once Cr improved, pain control. PLAN:  Multiple Bone Mets of unknown source  - SPEP neg, CT C/A/P, CEA and CA 19-9 elevated, highly suspect abd malignancy  9/17 Obtain CT and biopsy once Cr improves - possibly tomorrow  9/18 Cr improved to patient's baseline. Get CT C/A/P today. 9/19 CT consistent with primary lung cancer, cirrhosis, choleithiasis  9/20 Needs lung bx to be able to determine treatment options. Pulm consulted. 9/21 Pulm performing bronchoscopy with EBUS for biopsy today. Hold Xarelto. 9/22 Lung mass bx performed yesterday. Awaiting path. Resume Xarelto tonight. C/o pain - doesn't like taking morphine; will change to dilaudid. Acute on CKD  - Aggressive IVF  9/16 Cr improved to 1.69 from 2.23 yesterday. Con't aggressive IVF. 9/17 Cr down to 1.34. Con't IVF. 9/18 Cr continues to improve. Cr down to 1.20, which appears to be her baseline. 9/22 Cr up to 1.30. Con't IVF. Hypercalcemia  - IVF, add Aredia/calcitonin as needed  9/16 Corrected calcium down to 12.48 from 14.26 yesterday. Con't aggressive IVF. 9/17 Ca continues to trend down. Con't IVF. Give Aredia. 9/18 Rosey Ca 12.66, slightly up today. Aredia given yesterday. Start Calcitonin today. 9/19 Corrected Ca 11.12. Continue calcitonin  RESOLVED    Electrolyte Imbalance  - Replete prn per Cyndi SOPs    Malodorous urine  9/16 Check UA, UCx.  UA with moderate leukocytes - start Rocephin and await UCx results  9/17 Preliminary UCx results with mixed skin cherise. Con't Rocephin until final report - if remains negative, will DC Rocephin. 9/18 UCx with mixed skin cherise. DC Rocephin. Weakness  9/17 Consult PT  9/20 Consult OT    Dyspnea/Fluid overload  9/18 Pt appears more SOB today most likely r/t to fluid overload. Has been receiving aggressive IVF. Also 12# wt gain noted from yesterday.   Give lasix x 1.  9/19 Weight up again today-continue with lasix. 9/20 Continue daily Lasix      Disposition:  Lung mass bx performed 9/21 - path pending. Awaiting SNF placement. Continue home meds  Cyndi SOPs  DVT prophylaxis: On Xarelto            Bubba Avalos NP   [facility]  [address] [address]  Office : [phone]  Fax : [phone]       Attending Addendum:  I personally evaluated the patient with Bubba Avalos, N.P.,  and agree with the assessment, findings and plan as documented. Appears stable, heart regular without murmur, lungs clear, abdomen benign, we will continue IV fluids and Lasix.               Rd Jacobson MD  [facility]  [address] [address]  Office : [phone]  Fax : [phone]

## 2017-09-22 NOTE — PROGRESS NOTES
OT note:  Pt adamantly declined treatment. Pt would not even agree to get up to the chair. Pt states \"This is too much and I have to worry about me right now. \" Pt appears confused. Will re-attempt at a late date/time.   Katarzyna Willis

## 2017-09-22 NOTE — PROGRESS NOTES
Declined PT today. \"too much on my mind. \"  Parish Jordan, PT      Nursing asked PT to returned to the room because the patient got me confused with someone named Lashell Ashraf who the patient stated said he was from rehab, went through exercises with her and sat down by her and wanted to pray with her. We have no one named brooklynn in our department at all. No therapist besides myself has been there to see her. She then suggested that her family thinks she is going home. \" they dont know I am dying. \"  She started to become really upset. Suggested to her that I find someone she could talk to. Will discuss with her nurse. She appears confused to me. Different than the other day. Will let her nurse know. She again refused to get out of the bed today.   Parish Jordan, PT

## 2017-09-22 NOTE — PROGRESS NOTES
Dennie [facility]  Admission Date: 9/15/2017             Daily Progress Note: 9/22/2017    The patient's chart is reviewed and the patient is discussed with the staff. Patient is a 69 y. o. female with a hx of CAD, s/p PPM for AV block post procedure, a.fib xarelto 20 mg daily, DM, hypothyroidism s/p total thyroidectomy, HTN, HL, GERD, hep A, and morbid obesity. She is a lifelong never smoker. Tx for bronchitis and pain associated with cough 7/2017. Pain continued and radiated around chest and she underwent Cardiology evaluation with normal EF. She was referred to Pulmonary and underwent CXR, VQ scan negative for PE. Screening mammogram with no suspicious findings. Seen by Nephrology with ongoing pain and had bone scan 8/2017 with multifocal radiotracer activity concerning for metastatic disease and she was referred to Heme/Onc. She was seen Hem/onc on 9/15 CXR on 9/15 followed by Chest CT on 9/18 showing LLL lung mass with adenopathy. There were multiple bony lesions noted in the thoracic spine. We were asked to see for lung mass biopsy. She is not hypoxic and is tolerating Room air. She declined significantly over the past 2 months and more so over the past 4 weeks. She can barely walk to the bathroom now. She has lost over 40 pounds, unintentionally. Now s/p bronch with EBUS on 9/21/17 with LLL bronchi extrinsically compressed, most likely from tumor but no obvious endobronchial tumor seen. LN bx + for malignancy - path pending    Subjective:     Currently on O2 at 2 lpm via NC with o2 sat 96%. Minimal cough today. Denies chest pain. + constipation.      Current Facility-Administered Medications   Medication Dose Route Frequency    rivaroxaban (XARELTO) tablet 20 mg  20 mg Oral DAILY WITH DINNER    HYDROmorphone (PF) (DILAUDID) injection 1 mg  1 mg IntraVENous Q3H PRN    HYDROcodone-acetaminophen (NORCO) 7.5-325 mg per tablet 1 Tab  1 Tab Oral Q4H PRN    insulin lispro (HUMALOG) injection   SubCUTAneous AC&HS    sodium chloride (NS) flush 5-10 mL  5-10 mL IntraVENous Q8H    sodium chloride (NS) flush 5-10 mL  5-10 mL IntraVENous PRN    0.9% sodium chloride infusion  75 mL/hr IntraVENous CONTINUOUS    HYDROcodone-homatropine (HYCODAN) 5-1.5 mg/5 mL (5 mL) syrup 5 mL  5 mL Oral Q4H PRN    furosemide (LASIX) injection 40 mg  40 mg IntraVENous DAILY    magnesium oxide (MAG-OX) tablet 400 mg  400 mg Oral TID    docusate sodium (COLACE) capsule 100 mg  100 mg Oral DAILY    polyethylene glycol (MIRALAX) packet 17 g  17 g Oral DAILY PRN    glyBURIDE (DIABETA) tablet 1.25 mg  1.25 mg Oral BID WITH MEALS    mirtazapine (REMERON) tablet 7.5 mg  7.5 mg Oral QHS    pantoprazole (PROTONIX) tablet 40 mg  40 mg Oral ACB    levothyroxine (SYNTHROID) tablet 125 mcg  125 mcg Oral ACB    ondansetron (ZOFRAN ODT) tablet 8 mg  8 mg Oral Q8H PRN    ondansetron (ZOFRAN) injection 4 mg  4 mg IntraVENous Q4H PRN    sodium chloride (NS) flush 20 mL  20 mL InterCATHeter Q8H    heparin (porcine) pf 600 Units  600 Units InterCATHeter Q8H    sodium chloride (NS) flush 20 mL  20 mL InterCATHeter PRN    heparin (porcine) pf 600 Units  600 Units InterCATHeter PRN    amLODIPine (NORVASC) tablet 5 mg  5 mg Oral DAILY    valsartan (DIOVAN) tablet 320 mg  320 mg Oral DAILY       Review of Systems  Constitutional: negative for fever, chills, sweats  Cardiovascular: negative for chest pain, palpitations, syncope, edema  Gastrointestinal:  negative for dysphagia, reflux, vomiting, diarrhea, abdominal pain, or melena  Neurologic:  negative for focal weakness, numbness, headache    Objective:     Vitals:    09/21/17 2344 09/22/17 0455 09/22/17 0736 09/22/17 1125   BP: 150/58 145/70 129/68 140/70   Pulse: 60 94 91 95   Resp: 18 18 18 18   Temp: 98.1 °F (36.7 °C) 98.6 °F (37 °C) 98.7 °F (37.1 °C) 97.6 °F (36.4 °C)   SpO2: 91% 94% 98% 96%   Weight:  206 lb (93.4 kg)       Intake and Output:   09/20 1901 - 09/22 0700  In: 669 [P.O.:120; I.V.:549]  Out: 1500 [Urine:1500]  09/22 0701 - 09/22 1900  In: -   Out: 100 [Urine:100]    Physical Exam:   Constitution:  the patient is well developed and in no acute distress  EENMT:  Sclera clear, pupils equal, oral mucosa moist  Respiratory: diminished bilaterally, O2 at 2 lpm  Cardiovascular:  RRR without M,G,R  Gastrointestinal: soft and non-tender; with positive bowel sounds. Musculoskeletal: warm without cyanosis. There is no lower leg edema.   Skin:  no jaundice or rashes, no open wounds   Neurologic: no gross neuro deficits     Psychiatric:  alert and oriented x 3    CXR:   9/18 CT chest      LAB  Recent Labs      09/22/17   1146  09/22/17   0707  09/21/17   2106  09/21/17   1610  09/21/17   1114   GLUCPOC  204*  204*  142*  124*  117*      Recent Labs      09/21/17   0249  09/20/17   0644   WBC  5.7  9.3   HGB  9.6*  10.7*   HCT  28.4*  32.1*   PLT  82*  123*     Recent Labs      09/22/17   0506  09/21/17   0249  09/20/17   0644   NA  141  142  145   K  3.9  3.9  3.5   CL  110*  111*  117*   CO2  21  21  17*   GLU  169*  107*  90   BUN  18  15  13   CREA  1.30*  1.27*  0.92   MG   --   2.0  1.6*   CA  8.4  8.4  7.7*   ALB  2.6*  2.5*  2.4*   TBILI  0.7  0.5  0.5   ALT  51  54  52   SGOT  100*  108*  110*       Assessment:  (Medical Decision Making)     Hospital Problems  Date Reviewed: 9/15/2017          Codes Class Noted POA    Malignant neoplasm of lower lobe of left lung ([facility] ICD-10-CM: C34.32  ICD-9-CM: 162.5  9/22/2017 Yes    LN + - awaiting final path      Lung mass ICD-10-CM: R91.8  ICD-9-CM: 786.6  9/20/2017 Yes        Debility (Chronic) ICD-10-CM: R53.81  ICD-9-CM: 799.3  9/20/2017 Yes        Hypercalcemia ICD-10-CM: P66.14  ICD-9-CM: 275.42  9/15/2017 Yes        ILYA (acute kidney injury) [facility]) ICD-10-CM: N17.9  ICD-9-CM: 584.9  9/15/2017 Yes    Creat 1.3  Lasix 40 mg IV daily      * (Principal)Metastasis to bone of unknown primary ([facility] ICD-10-CM: C79.51, C80.1  ICD-9-CM: 198.5, 199.1  9/15/2017 Yes         BS range 124-204  Glyburide / SSI      Plan:  (Medical Decision Making)     --add miralax for constipation  --add fentanyl patch / roxicodone for longer acting pain relief  --xarelto  --NS at 75 ml/hr  --patient interested in considering options - tx vs Hospice.  is on HD and she is unsure that she would have adequate care at home and would like Hospice presentation for available services as she would prefer to be at home if possible      More than 50% of the time documented was spent in face-to-face contact with the patient and in the care of the patient on the floor/unit where the patient is located. Avtar Pacheco NP    Lungs:  Less wheezing  Heart:  RRR with no Murmur/Rubs/Gallops    Additional Comments:  Pt with Stage 4 lung ca. I discussed with patient goals of care and different treatment options. I encouraged her to discuss that with oncology and her family. I also advised her to think of Hospice service at home regardless of treatment plan since it will provide her with better services she needs    I have spoken with and examined the patient. I agree with the above assessment and plan as documented.     Tahira Goyal MD

## 2017-09-22 NOTE — PROGRESS NOTES
END OF SHIFT NOTE:    Intake/Output  09/22 0701 - 09/22 1900  In: 581 [I.V.:581]  Out: 400 [Urine:400]   Voiding: Yes}  Catheter: No  Drain:  NO            Stool:  1  Emesis:  0          VITAL SIGNS  Patient Vitals for the past 12 hrs:   Temp Pulse Resp BP SpO2   09/22/17 1548 97.6 °F (36.4 °C) 100 18 133/65 94 %   09/22/17 1125 97.6 °F (36.4 °C) 95 18 140/70 96 %   09/22/17 0736 98.7 °F (37.1 °C) 91 18 129/68 98 %       Pain Assessment  Pain 1  Pain Scale 1: Numeric (0 - 10) (09/22/17 1345)  Pain Intensity 1: 10 (09/22/17 1345)  Patient Stated Pain Goal: 0 (09/22/17 0521)  Pain Reassessment 1: Yes (09/22/17 1345)  Pain Onset 1: chronic (09/22/17 0456)  Pain Location 1: Rib cage (left side) (09/22/17 1313)  Pain Orientation 1: Left; Lower (09/22/17 0456)  Pain Description 1: Fronie Chaya; Shooting (09/22/17 1313)  Pain Intervention(s) 1: Medication (see MAR) (09/22/17 1313)    Ambulating  No    Additional Information:     Shift report will be  given to oncoming nurse at the bedside.     Terri Liz RN

## 2017-09-23 NOTE — PROGRESS NOTES
END OF SHIFT NOTE:    Patient had a restful night, pain controlled with new regimen. 0600 Roxicodone held, patient sleeping    Intake/Output  09/22 1901 - 09/23 0700  In: 954 [P.O.:340; I.V.:614]  Out: 50 [Urine:50]   Voiding: YES  Catheter: NO  Drain:              Stool:  0 occurrences. Stool Assessment  Stool Color: Brown (09/19/17 2332)  Stool Appearance: Loose; Soft (09/19/17 2332)  Stool Amount: Small (09/19/17 2332)  Stool Source/Status: Rectum (09/19/17 2332)    Emesis:  0 occurrences. VITAL SIGNS  Patient Vitals for the past 12 hrs:   Temp Pulse Resp BP SpO2   09/23/17 0346 97.5 °F (36.4 °C) 92 17 123/61 94 %   09/23/17 0001 97.5 °F (36.4 °C) 90 18 95/60 94 %   09/22/17 1946 97.7 °F (36.5 °C) 99 17 112/59 97 %       Pain Assessment  Pain 1  Pain Scale 1: Visual (09/23/17 0124)  Pain Intensity 1: 0 (09/23/17 0124)  Patient Stated Pain Goal: 0 (09/23/17 0124)  Pain Reassessment 1: Patient sleeping (09/23/17 0124)  Pain Onset 1: chronic (09/22/17 2115)  Pain Location 1: Rib cage;Back (09/22/17 2115)  Pain Orientation 1: Left; Lower (09/22/17 2115)  Pain Description 1: Aching; Sore (09/22/17 2115)  Pain Intervention(s) 1: Medication (see MAR) (09/22/17 2115)    Ambulating  Yes    Additional Information:     Shift report to be given to oncoming nurse at the bedside.     [facility]

## 2017-09-23 NOTE — PROGRESS NOTES
Monisha Davis  Admission Date: 9/15/2017             Daily Progress Note: 9/23/2017    The patient's chart is reviewed and the patient is discussed with the staff. Patient is a 69 y. o. female with a hx of CAD, s/p PPM for AV block post procedure, a.fib xarelto 20 mg daily, DM, hypothyroidism s/p total thyroidectomy, HTN, HL, GERD, hep A, and morbid obesity. She is a lifelong never smoker. Tx for bronchitis and pain associated with cough 7/2017. Pain continued and radiated around chest and she underwent Cardiology evaluation with normal EF. She was referred to Pulmonary and underwent CXR, VQ scan negative for PE. Screening mammogram with no suspicious findings. Seen by Nephrology with ongoing pain and had bone scan 8/2017 with multifocal radiotracer activity concerning for metastatic disease and she was referred to Heme/Onc. She was seen Hem/onc on 9/15 CXR on 9/15 followed by Chest CT on 9/18 showing LLL lung mass with adenopathy. There were multiple bony lesions noted in the thoracic spine. We were asked to see for lung mass biopsy. She is not hypoxic and is tolerating Room air. She declined significantly over the past 2 months and more so over the past 4 weeks. She can barely walk to the bathroom now. She has lost over 40 pounds, unintentionally. Now s/p bronch with EBUS on 9/21/17 with LLL bronchi extrinsically compressed, most likely from tumor but no obvious endobronchial tumor seen. LN bx + for malignancy - path pending    Subjective:     Currently on o2 at 2 lpm via NC with O2 sat 94%. Ongoing non-productive cough. Pain controlled. Had nausea this am.  Bowels have still not moved.       Current Facility-Administered Medications   Medication Dose Route Frequency    rivaroxaban (XARELTO) tablet 15 mg  15 mg Oral DAILY WITH DINNER    albuterol (PROVENTIL VENTOLIN) nebulizer solution 2.5 mg  2.5 mg Nebulization Q4H PRN    HYDROmorphone (PF) (DILAUDID) injection 1 mg  1 mg IntraVENous Q3H PRN    insulin lispro (HUMALOG) injection   SubCUTAneous AC&HS    oxyCODONE IR (ROXICODONE) tablet 5 mg  5 mg Oral Q6H    fentaNYL (DURAGESIC) 25 mcg/hr patch 1 Patch  1 Patch TransDERmal Q72H    polyethylene glycol (MIRALAX) packet 17 g  17 g Oral DAILY    sodium chloride (NS) flush 5-10 mL  5-10 mL IntraVENous Q8H    sodium chloride (NS) flush 5-10 mL  5-10 mL IntraVENous PRN    0.9% sodium chloride infusion  75 mL/hr IntraVENous CONTINUOUS    HYDROcodone-homatropine (HYCODAN) 5-1.5 mg/5 mL (5 mL) syrup 5 mL  5 mL Oral Q4H PRN    furosemide (LASIX) injection 40 mg  40 mg IntraVENous DAILY    magnesium oxide (MAG-OX) tablet 400 mg  400 mg Oral TID    docusate sodium (COLACE) capsule 100 mg  100 mg Oral DAILY    polyethylene glycol (MIRALAX) packet 17 g  17 g Oral DAILY PRN    glyBURIDE (DIABETA) tablet 1.25 mg  1.25 mg Oral BID WITH MEALS    mirtazapine (REMERON) tablet 7.5 mg  7.5 mg Oral QHS    pantoprazole (PROTONIX) tablet 40 mg  40 mg Oral ACB    levothyroxine (SYNTHROID) tablet 125 mcg  125 mcg Oral ACB    ondansetron (ZOFRAN ODT) tablet 8 mg  8 mg Oral Q8H PRN    ondansetron (ZOFRAN) injection 4 mg  4 mg IntraVENous Q4H PRN    sodium chloride (NS) flush 20 mL  20 mL InterCATHeter Q8H    heparin (porcine) pf 600 Units  600 Units InterCATHeter Q8H    sodium chloride (NS) flush 20 mL  20 mL InterCATHeter PRN    heparin (porcine) pf 600 Units  600 Units InterCATHeter PRN    amLODIPine (NORVASC) tablet 5 mg  5 mg Oral DAILY    valsartan (DIOVAN) tablet 320 mg  320 mg Oral DAILY       Review of Systems  Constitutional: negative for fever, chills, sweats  Cardiovascular: negative for chest pain, palpitations, syncope, edema  Gastrointestinal:  negative for dysphagia, reflux, vomiting, diarrhea, abdominal pain, or melena  Neurologic:  negative for focal weakness, numbness, headache    Objective:     Vitals:    09/23/17 0348 09/23/17 0730 09/23/17 1050 09/23/17 1201   BP: 135/52 137/65    Pulse:  (!) 101 (!) 105    Resp:  17 17    Temp:  97.9 °F (36.6 °C) 98.9 °F (37.2 °C)    SpO2:  97% 93% 94%   Weight: 221 lb 8 oz (100.5 kg)        Intake and Output:   09/21 1901 - 09/23 0700  In: 2159 [P.O.:340; I.V.:1195]  Out: 850 [Urine:850]  09/23 0701 - 09/23 1900  In: 240 [P.O.:240]  Out: 300 [Urine:300]    Physical Exam:   Constitution:  the patient is well developed and in no acute distress  EENMT:  Sclera clear, pupils equal, oral mucosa moist  Respiratory: scattered wheezing, O2 at 2 lpm  Cardiovascular:  RRR without M,G,R  Gastrointestinal: slightly firm with mild tenderness to palpation; with positive bowel sounds / tympanic  Musculoskeletal: warm without cyanosis. There is no lower leg edema.   Skin:  no jaundice or rashes, no open wounds   Neurologic: no gross neuro deficits     Psychiatric:  alert and oriented x 3    CXR:   9/15      9/18      LAB  Recent Labs      09/23/17   1114  09/23/17   0715  09/22/17   2039  09/22/17   1625  09/22/17   1146   GLUCPOC  156*  142*  155*  180*  204*      Recent Labs      09/23/17   0405  09/21/17   0249   WBC  9.5  5.7   HGB  10.3*  9.6*   HCT  31.7*  28.4*   PLT  124*  82*     Recent Labs      09/23/17   0405  09/22/17   0506  09/21/17   0249   NA  143  141  142   K  3.8  3.9  3.9   CL  112*  110*  111*   CO2  22 21 21   GLU  125*  169*  107*   BUN  22  18  15   CREA  1.68*  1.30*  1.27*   MG  2.1   --   2.0   CA  7.8*  8.4  8.4   ALB   --   2.6*  2.5*   TBILI   --   0.7  0.5   ALT   --   51  54   SGOT   --   100*  108*       Assessment:  (Medical Decision Making)     Hospital Problems  Date Reviewed: 9/23/2017          Codes Class Noted POA    Controlled type 2 diabetes mellitus without complication, without long-term current use of insulin (HCC) (Chronic) ICD-10-CM: E11.9  ICD-9-CM: 250.00  9/22/2017 Yes    BS range 142 - 180  diabeta /  SSI      Malignant neoplasm of lower lobe of left lung (HCC) ICD-10-CM: C34.32  ICD-9-CM: 162.5 9/22/2017 Yes    Awaiting final pathology  Patient does not want IV chemo but per Heme/onc notes would be willing to consider oral agent targeted therapy. Would like Hospice presentation to know availability of services      Lung mass ICD-10-CM: R91.8  ICD-9-CM: 786.6  9/20/2017 Yes        Debility (Chronic) ICD-10-CM: R53.81  ICD-9-CM: 799.3  9/20/2017 Yes    Significantly worsening over the last several months      Hypercalcemia ICD-10-CM: E83.52  ICD-9-CM: 275.42  9/15/2017 Yes    Ca++ 7.8 today      ILYA (acute kidney injury) ([facility] [address] ICD-10-CM: N17.9  ICD-9-CM: 584.9  9/15/2017 Yes    Creat  Elevated at 1.68  Stop lasix / continue IVF      * (Principal)Metastasis to bone of unknown primary [facility]) ICD-10-CM: C79.51, C80.1  ICD-9-CM: 198.5, 199.1  9/15/2017 Yes              Plan:  (Medical Decision Making)     --scattered wheezing on exam - will add scheduled nebs / may need steroids  --Duragesic patch / Oxycodone - scheduled for pain and dyspnea  --miralax for constipation  --stop lasix 40 mg IV daily - creat starting to rise 1.68 today. ? If accurate I&Os with total of 450 ml output / 24  hours and overall + 11 liters  --NS at 75 ml/hr  --daily labs / check follow up BNP in am  --patient interested in considering options - tx vs Hospice.  is on HD and she is unsure that she would have adequate care at home and would like Hospice presentation for available services as she would prefer to be at home if possible  --considering STR at discharge    More than 50% of the time documented was spent in face-to-face contact with the patient and in the care of the patient on the floor/unit where the patient is located. Wayne Vernon NP  I have spoken with and examined the patient. I agree with the above assessment and plan as documented.   Mrs. José Miguel Clinton pathology from 4L lymph node was suggestive of lung adenocarcinoma which is likely advanced stage with concerns about liver/bony metastases    Gen: pleasant  Lungs:  No active wheezing presently  Heart:  RRR with no Murmur/Rubs/Gallops  Abd: NABS  Ext: + LE edema    --repeat CXR  --incentive spirometry  --palliative care involvement requested for Monday (simply for Stage IV lung adenoca, could defer if chooses to pursue hospice)  --f/u with oncology regarding treatment options    MD Silas Calderon MD

## 2017-09-23 NOTE — PROGRESS NOTES
LMSW met with pt to discuss care planning for SNF rehab options and home hospice care. Pt is interested in referrals to [facility], [facility], [address] [place] [address], [facility] and [facility]. Pt wants to know if any of these facilities can accept her for care. She also has questions about home hospice for care planning. [facility] has been asked to see pt and they will see her tomorrow (Sunday) to answer pt questions about their support in the home. Will follow up with pt and assist further as appropriate.

## 2017-09-23 NOTE — PROGRESS NOTES
[facility]        Inpatient Hematology / Oncology Progress Note      Admission Date: 9/15/2017 10:20 AM  Reason for Admission/Hospital Course: Lung mass [R91.8]      24 Hour Events:  Afebrile, VSS  CT showed lung mass, most likely primary  Lung mass bx on  - awaiting path  Discharge pending rehab placement  Considering palliative care vs treatment  Up in chair, weak    ROS:  Constitutional: Negative for fever, chills, weakness, malaise, fatigue. CV: Negative for chest pain, palpitations, edema. Respiratory: +dyspnea, +cough. Negative for wheezing. GI: Negative for nausea, abdominal pain, diarrhea. 10 point review of systems is otherwise negative with the exception of the elements mentioned above in the HPI. No Known Allergies    OBJECTIVE:  Patient Vitals for the past 8 hrs:   BP Temp Pulse Resp SpO2   17 1050 137/65 98.9 °F (37.2 °C) (!) 105 17 93 %   17 0730 135/52 97.9 °F (36.6 °C) (!) 101 17 97 %     Temp (24hrs), Av.9 °F (36.6 °C), Min:97.5 °F (36.4 °C), Max:98.9 °F (37.2 °C)     0701 -  1900  In: 240 [P.O.:240]  Out: 300 [Urine:300]    Physical Exam:  Constitutional: Well developed, well nourished female in no acute distress, lying comfortably in the hospital bed. HEENT: Normocephalic and atraumatic. Oropharynx is clear, mucous membranes are moist.  Extraocular muscles are intact. Lymph node   Deferred   Skin Warm and dry. No bruising and no rash noted. No erythema. No pallor. Respiratory Lungs with wheezes. On 2L O2 NC but dyspneic with conversation    CVS Normal rate, regular rhythm and normal S1 and S2. No murmurs, gallops, or rubs. Abdomen Soft, nontender and nondistended, normoactive bowel sounds. No palpable mass. No hepatosplenomegaly. Neuro Grossly nonfocal with no obvious sensory or motor deficits. MSK Normal range of motion in general.  No tenderness.  +Generalized edema with 3+ BLE edema   Psych Appropriate mood and affect. Labs:      Recent Labs      09/23/17   0405  09/21/17   0249   WBC  9.5  5.7   RBC  3.36*  3.11*   HGB  10.3*  9.6*   HCT  31.7*  28.4*   MCV  94.3  91.3   MCH  30.7  30.9   MCHC  32.5  33.8   RDW  16.5*  16.1*   PLT  124*  82*   GRANS  59  63   LYMPH  21  21   MONOS  16*  13*   EOS  3  3   BASOS  0  0   IG  0.7  0.4   DF  AUTOMATED  AUTOMATED   ANEU  5.6  3.6   ABL  2.0  1.2   ABM  1.5*  0.7   FRANKO  0.3  0.1   ABB  0.0  0.0   AIG  0.1  0.0        Recent Labs      09/23/17   0405  09/22/17   0506  09/21/17   0249   NA  143  141  142   K  3.8  3.9  3.9   CL  112*  110*  111*   CO2  22  21  21   AGAP  9  10  10   GLU  125*  169*  107*   BUN  22  18  15   CREA  1.68*  1.30*  1.27*   GFRAA  38*  51*  53*   GFRNA  31*  42*  43*   CA  7.8*  8.4  8.4   SGOT   --   100*  108*   AP   --   239*  215*   TP   --   6.4  6.0*   ALB   --   2.6*  2.5*   GLOB   --   3.8*  3.5   AGRAT   --   0.7*  0.7*   MG  2.1   --   2.0         Imaging:  XR CHEST SNGL V [[id number]] Collected: 09/15/17 1506      Order Status: Completed Updated: 09/15/17 1509     Narrative:       Single portable upright chest x-ray September 15, 2017    Reference exam: August 14, 2017    INDICATION: PICC line placement    FINDINGS: Right-sided electronic device with median sternotomy clips and wires  are seen with lungs underinflated. There is minimal stranding blurring the  vascular borders, left-sided PICC line tip overlies the upper superior vena cava  at the azygos level.       Impression:       IMPRESSION: Catheter tip overlies the upper superior vena cava.          ASSESSMENT:    Problem List  Date Reviewed: 9/22/2017          Codes Class Noted    Controlled type 2 diabetes mellitus without complication, without long-term current use of insulin (HCC) (Chronic) ICD-10-CM: E11.9  ICD-9-CM: 250.00  9/22/2017        Malignant neoplasm of lower lobe of left lung ([facility] [address] ICD-10-CM: C34.32  ICD-9-CM: 162.5  9/22/2017        Lung mass ICD-10-CM: R91.8  ICD-9-CM: 786.6  9/20/2017        Debility (Chronic) ICD-10-CM: R53.81  ICD-9-CM: 799.3  9/20/2017        Hypercalcemia ICD-10-CM: F18.62  ICD-9-CM: 275.42  9/15/2017        ILYA (acute kidney injury) ([facility] [address] ICD-10-CM: N17.9  ICD-9-CM: 584.9  9/15/2017        * (Principal)Metastasis to bone of unknown primary [facility]) ICD-10-CM: C79.51, C80.1  ICD-9-CM: 198.5, 199.1  9/15/2017        Comprehensive diabetic foot examination, type 2 DM, encounter for [facility]) ICD-10-CM: E11.9  ICD-9-CM: 250.00  6/91/1333        Metabolic acidosis ZOA-07-BX: E87.2  ICD-9-CM: 276.2  6/22/2017        Aortic valve disease ICD-10-CM: I35.9  ICD-9-CM: 424.1  5/22/2017        Irritable bowel syndrome with both constipation and diarrhea ICD-10-CM: K58.2  ICD-9-CM: 564.1  2/22/2017        Acquired hypothyroidism ICD-10-CM: E03.9  ICD-9-CM: 244.9  12/15/2016        Cardiac pacemaker ICD-10-CM: Z95.0  ICD-9-CM: V45.01  Unknown        Hypertension, essential (Chronic) ICD-10-CM: I10  ICD-9-CM: 401.9  Unknown        Hyperlipidemia (Chronic) ICD-10-CM: E78.5  ICD-9-CM: 272.4  Unknown        Paroxysmal atrial fibrillation (HCC) ICD-10-CM: I48.0  ICD-9-CM: 427.31  Unknown        Chronic kidney disease (CKD), stage III (moderate) (Chronic) ICD-10-CM: N18.3  ICD-9-CM: 161. 3  Unknown    Overview Signed 12/15/2016 10:08 AM by Neil Odell MD     Followed by Dr. Rehana Loredo             Vertigo, benign positional ICD-10-CM: H81.10  ICD-9-CM: 386.11  Unknown            69 y.o. F consulted for multiple bone metastases of unknown source. We discussed this is most likely malignancy although SPEP is negative but myeloma actually is usu negative for bone scan, agreed with CT C/A/P, norco prn pain, remeron for anorexia and weight loss, check tumor markers, last colonoscopy done in 2009 and will discuss whether need to repeat.  However lab returned showed hypercalcemia and ILYA, called pt back to arrange admission, need aggressive resuscitation IVF, monitor Cr and Nabeel level and add bisphophonate/calcitonin as needed, tumor marker very high for CEA/, highly suspect abd malignancy and arrange CT and biopsy once Cr improved, pain control. PLAN:  Multiple Bone Mets of unknown source  - SPEP neg, CT C/A/P, CEA and CA 19-9 elevated, highly suspect abd malignancy  9/17 Obtain CT and biopsy once Cr improves - possibly tomorrow  9/18 Cr improved to patient's baseline. Get CT C/A/P today. 9/19 CT consistent with primary lung cancer, cirrhosis, choleithiasis  9/20 Needs lung bx to be able to determine treatment options. Pulm consulted. 9/21 Pulm performing bronchoscopy with EBUS for biopsy today. Hold Xarelto. 9/22 Lung mass bx performed yesterday. Awaiting path. Resume Xarelto tonight. C/o pain - doesn't like taking morphine; will change to dilaudid. 9/23 Await path from biopsy. Will need to send for further molecular testing on Monday. Discussed hospice vs treatment. She would not want IV chemotherapy but would be open to oral agent targeted therapy if possible. Plan for rehab while further results come in from tissue and can discuss treatment options with Dr. Radha Navarro in clinic in about 2 weeks. Pain better controlled with dilaudid. Acute on CKD  - Aggressive IVF  9/16 Cr improved to 1.69 from 2.23 yesterday. Con't aggressive IVF. 9/17 Cr down to 1.34. Con't IVF. 9/18 Cr continues to improve. Cr down to 1.20, which appears to be her baseline. 9/22 Cr up to 1.30. Con't IVF. Hypercalcemia  - IVF, add Aredia/calcitonin as needed  9/16 Corrected calcium down to 12.48 from 14.26 yesterday. Con't aggressive IVF. 9/17 Ca continues to trend down. Con't IVF. Give Aredia. 9/18 Rosey Ca 12.66, slightly up today. Aredia given yesterday. Start Calcitonin today. 9/19 Corrected Ca 11.12.  Continue calcitonin  RESOLVED    Electrolyte Imbalance  - Replete prn per Cyndi SOPs    Malodorous urine  9/16 Check UA, UCx.  UA with moderate leukocytes - start Rocephin and await UCx results  9/17 Preliminary UCx results with mixed skin cherise. Con't Rocephin until final report - if remains negative, will DC Rocephin. 9/18 UCx with mixed skin cherise. DC Rocephin. Weakness  9/17 Consult PT  9/20 Consult OT    Dyspnea/Fluid overload  9/18 Pt appears more SOB today most likely r/t to fluid overload. Has been receiving aggressive IVF. Also 12# wt gain noted from yesterday. Give lasix x 1.  9/19 Weight up again today-continue with lasix. 9/20 Continue daily Lasix      Disposition: Discussed discharge planning with social work - will follow up. PPD placed 9/21. Lung mass bx performed 9/21 - path pending. Continue home meds  Cyndi SOPs  DVT prophylaxis: On [facility]  [address] [address]  Office : [phone]  Fax : [phone]       Attending Addendum:  I personally evaluated the patient with Karen Wiggins, N.P.,  and agree with the assessment, findings and plan as documented. Appears stable, heart regular without murmur, we will follow up the results of her lung biopsy and try to place her in a SNF.               Sravan Rosenthal MD  [facility]  [address] [address]  Office : [phone]  Fax : [phone]

## 2017-09-23 NOTE — PROGRESS NOTES
Spoke with Nurse Practitioner related to wheezing and SOB. New orders received and processed for PRN albuterol treatments. RT at bedside.

## 2017-09-24 NOTE — PROGRESS NOTES
Problem: Falls - Risk of  Goal: *Absence of Falls  Document Sunni Fall Risk and appropriate interventions in the flowsheet.    Outcome: Progressing Towards Goal  Fall Risk Interventions:  Mobility Interventions: Communicate number of staff needed for ambulation/transfer, Patient to call before getting OOB, PT Consult for mobility concerns, PT Consult for assist device competence     Mentation Interventions: More frequent rounding, Door open when patient unattended, Adequate sleep, hydration, pain control     Medication Interventions: Teach patient to arise slowly, Patient to call before getting OOB     Elimination Interventions: Call light in reach, Patient to call for help with toileting needs

## 2017-09-24 NOTE — PROGRESS NOTES
Pt condition has continued to decline. Chen Patricio RN with [facility] has met today with pt, spouse and consulted son by phone. Decision has been made for transition to the [facility] tomorrow. Referrals for SNF rehab will be cancelled at this time. Transfer to [facility] will be finalized Monday.

## 2017-09-24 NOTE — PROGRESS NOTES
[facility]        Inpatient Hematology / Oncology Progress Note      Admission Date: 9/15/2017 10:20 AM  Reason for Admission/Hospital Course: Lung mass [R91.8]      24 Hour Events:  Afebrile, VSS  CT showed lung mass, most likely primary  Lung mass bx on  - awaiting final path  Discharge pending rehab placement - SW following  Feeling better but still very dyspneic  Creatinine worse    ROS:  Constitutional: Negative for fever, chills, weakness, malaise, fatigue. CV: Negative for chest pain, palpitations, edema. Respiratory: +dyspnea, +cough. Negative for wheezing. GI: Negative for nausea, abdominal pain, diarrhea. 10 point review of systems is otherwise negative with the exception of the elements mentioned above in the HPI. No Known Allergies    OBJECTIVE:  Patient Vitals for the past 8 hrs:   BP Temp Pulse Resp SpO2 Weight   17 0730 - - - - 96 % -   17 0643 126/59 98.2 °F (36.8 °C) 88 18 99 % -   17 0419 - - - - - 223 lb 14.4 oz (101.6 kg)   17 0400 108/65 98 °F (36.7 °C) 94 18 97 % -     Temp (24hrs), Av.3 °F (36.8 °C), Min:97.7 °F (36.5 °C), Max:99.4 °F (37.4 °C)     0701 -  1900  In: 120 [P.O.:120]  Out: -     Physical Exam:  Constitutional: Well developed, well nourished female in no acute distress, lying comfortably in the hospital bed. HEENT: Normocephalic and atraumatic. Oropharynx is clear, mucous membranes are moist.  Extraocular muscles are intact. Lymph node   Deferred   Skin Warm and dry. No bruising and no rash noted. No erythema. No pallor. Respiratory Lungs with wheezes. On 2L O2 NC but dyspneic with conversation    CVS Normal rate, regular rhythm and normal S1 and S2. No murmurs, gallops, or rubs. Abdomen Soft, nontender and nondistended, normoactive bowel sounds. No palpable mass. No hepatosplenomegaly. Neuro Grossly nonfocal with no obvious sensory or motor deficits.    MSK Normal range of motion in general.  No tenderness. +Generalized edema with 3+ BLE edema   Psych Appropriate mood and affect. Labs:      Recent Labs      09/23/17   0405   WBC  9.5   RBC  3.36*   HGB  10.3*   HCT  31.7*   MCV  94.3   MCH  30.7   MCHC  32.5   RDW  16.5*   PLT  124*   GRANS  59   LYMPH  21   MONOS  16*   EOS  3   BASOS  0   IG  0.7   DF  AUTOMATED   ANEU  5.6   ABL  2.0   ABM  1.5*   FRANKO  0.3   ABB  0.0   AIG  0.1        Recent Labs      09/24/17   0348  09/23/17   0405  09/22/17   0506   NA  140  143  141   K  3.9  3.8  3.9   CL  109*  112*  110*   CO2  22  22  21   AGAP  9  9  10   GLU  97  125*  169*   BUN  28*  22  18   CREA  2.39*  1.68*  1.30*   GFRAA  25*  38*  51*   GFRNA  21*  31*  42*   CA  7.1*  7.8*  8.4   SGOT   --    --   100*   AP   --    --   239*   TP   --    --   6.4   ALB   --    --   2.6*   GLOB   --    --   3.8*   AGRAT   --    --   0.7*   MG   --   2.1   --          Imaging:  XR CHEST SNGL V [[id number]] Collected: 09/15/17 1506      Order Status: Completed Updated: 09/15/17 1509     Narrative:       Single portable upright chest x-ray September 15, 2017    Reference exam: August 14, 2017    INDICATION: PICC line placement    FINDINGS: Right-sided electronic device with median sternotomy clips and wires  are seen with lungs underinflated. There is minimal stranding blurring the  vascular borders, left-sided PICC line tip overlies the upper superior vena cava  at the azygos level.       Impression:       IMPRESSION: Catheter tip overlies the upper superior vena cava.          ASSESSMENT:    Problem List  Date Reviewed: 9/23/2017          Codes Class Noted    Controlled type 2 diabetes mellitus without complication, without long-term current use of insulin (HCC) (Chronic) ICD-10-CM: E11.9  ICD-9-CM: 250.00  9/22/2017        Malignant neoplasm of lower lobe of left lung ([facility] [address] ICD-10-CM: C34.32  ICD-9-CM: 162.5  9/22/2017        Lung mass ICD-10-CM: R91.8  ICD-9-CM: 786.6  9/20/2017        Debility (Chronic) ICD-10-CM: R53.81  ICD-9-CM: 799.3  9/20/2017        Hypercalcemia ICD-10-CM: G86.29  ICD-9-CM: 275.42  9/15/2017        ILYA (acute kidney injury) [facility]) ICD-10-CM: N17.9  ICD-9-CM: 584.9  9/15/2017        * (Principal)Metastasis to bone of unknown primary [facility]) ICD-10-CM: C79.51, C80.1  ICD-9-CM: 198.5, 199.1  9/15/2017        Comprehensive diabetic foot examination, type 2 DM, encounter for [facility]) ICD-10-CM: E11.9  ICD-9-CM: 250.00  8/99/3464        Metabolic acidosis STO-98-HC: E87.2  ICD-9-CM: 276.2  6/22/2017        Aortic valve disease ICD-10-CM: I35.9  ICD-9-CM: 424.1  5/22/2017        Irritable bowel syndrome with both constipation and diarrhea ICD-10-CM: K58.2  ICD-9-CM: 564.1  2/22/2017        Acquired hypothyroidism ICD-10-CM: E03.9  ICD-9-CM: 244.9  12/15/2016        Cardiac pacemaker ICD-10-CM: Z95.0  ICD-9-CM: V45.01  Unknown        Hypertension, essential (Chronic) ICD-10-CM: I10  ICD-9-CM: 401.9  Unknown        Hyperlipidemia (Chronic) ICD-10-CM: E78.5  ICD-9-CM: 272.4  Unknown        Paroxysmal atrial fibrillation (HCC) ICD-10-CM: I48.0  ICD-9-CM: 427.31  Unknown        Chronic kidney disease (CKD), stage III (moderate) (Chronic) ICD-10-CM: N18.3  ICD-9-CM: 410. 3  Unknown    Overview Signed 12/15/2016 10:08 AM by Juan Long MD     Followed by Dr. Viry Carlin             Vertigo, benign positional ICD-10-CM: H81.10  ICD-9-CM: 386.11  Unknown            69 y.o. F consulted for multiple bone metastases of unknown source. We discussed this is most likely malignancy although SPEP is negative but myeloma actually is usu negative for bone scan, agreed with CT C/A/P, norco prn pain, remeron for anorexia and weight loss, check tumor markers, last colonoscopy done in 2009 and will discuss whether need to repeat.  However lab returned showed hypercalcemia and ILYA, called pt back to arrange admission, need aggressive resuscitation IVF, monitor Cr and Nabeel level and add bisphophonate/calcitonin as needed, tumor marker very high for CEA/, highly suspect abd malignancy and arrange CT and biopsy once Cr improved, pain control. PLAN:  Multiple Bone Mets of unknown source  - SPEP neg, CT C/A/P, CEA and CA 19-9 elevated, highly suspect abd malignancy  9/17 Obtain CT and biopsy once Cr improves - possibly tomorrow  9/18 Cr improved to patient's baseline. Get CT C/A/P today. 9/19 CT consistent with primary lung cancer, cirrhosis, choleithiasis  9/20 Needs lung bx to be able to determine treatment options. Pulm consulted. 9/21 Pulm performing bronchoscopy with EBUS for biopsy today. Hold Xarelto. 9/22 Lung mass bx performed yesterday. Awaiting path. Resume Xarelto tonight. C/o pain - doesn't like taking morphine; will change to dilaudid. 9/23 Await path from biopsy. Will need to send for further molecular testing on Monday. Discussed hospice vs treatment. She would not want IV chemotherapy but would be open to oral agent targeted therapy if possible. Plan for rehab while further results come in from tissue and can discuss treatment options with Dr. Wess Bloch in clinic in about 2 weeks. Pain better controlled with dilaudid. Acute on CKD  - Aggressive IVF  9/16 Cr improved to 1.69 from 2.23 yesterday. Con't aggressive IVF. 9/17 Cr down to 1.34. Con't IVF. 9/18 Cr continues to improve. Cr down to 1.20, which appears to be her baseline. 9/22 Cr up to 1.30. Con't IVF. 9/24 Worse today, off lasix. Continues on fluids. Consider nephrology consult    Hypercalcemia  - IVF, add Aredia/calcitonin as needed  9/16 Corrected calcium down to 12.48 from 14.26 yesterday. Con't aggressive IVF. 9/17 Ca continues to trend down. Con't IVF. Give Aredia. 9/18 Rosey Ca 12.66, slightly up today. Aredia given yesterday. Start Calcitonin today. 9/19 Corrected Ca 11.12.  Continue calcitonin  RESOLVED    Electrolyte Imbalance  - Replete prn per Cyndi SOPs    Malodorous urine  9/16 Check UA, UCx.  UA with moderate leukocytes - start Rocephin and await UCx results  9/17 Preliminary UCx results with mixed skin cherise. Con't Rocephin until final report - if remains negative, will DC Rocephin. 9/18 UCx with mixed skin cherise. DC Rocephin. Weakness  9/17 Consult PT  9/20 Consult OT    Dyspnea/Fluid overload  9/18 Pt appears more SOB today most likely r/t to fluid overload. Has been receiving aggressive IVF. Also 12# wt gain noted from yesterday. Give lasix x 1.  9/19 Weight up again today-continue with lasix. 9/20 Continue daily Lasix  9/24 lasix dc by pulm yesterday given elevated creatinine. Added albuterol yesterday which she felt helped dyspnea    Constipation  9/24 Added pericolace. Continue miralax      Disposition: Discharge to rehab - social work assisting. PPD placed 9/21. Lung mass bx performed 9/21 - path pending. Continue home meds  Cyndi SOPs  DVT prophylaxis: On [address] Fazal Nicole, NP   [facility]  [address] [address]  Office : [phone]  Fax : [phone]         Attending Addendum:  I personally evaluated the patient with Jasmine Moralez NALEXANDREA.,  and agree with the assessment, findings and plan as documented. Appears stable, her renal function is getting worse, we will ask Nephrology to evaluate her.               Patricio Clement MD  [address] [place] [address]  Office : [phone]  Fax : [phone]

## 2017-09-24 NOTE — PROGRESS NOTES
[facility]  Admission Date: 9/15/2017             Daily Progress Note: 9/24/2017    The patient's chart is reviewed and the patient is discussed with the staff. Patient is a 69 y. o. female with a hx of CAD, s/p PPM for AV block post procedure, a.fib xarelto 20 mg daily, DM, hypothyroidism s/p total thyroidectomy, HTN, HL, GERD, hep A, and morbid obesity. She is a lifelong never smoker. Tx for bronchitis and pain associated with cough 7/2017. Pain continued and radiated around chest and she underwent Cardiology evaluation with normal EF. She was referred to Pulmonary and underwent CXR, VQ scan negative for PE. Screening mammogram with no suspicious findings. Seen by Nephrology with ongoing pain and had bone scan 8/2017 with multifocal radiotracer activity concerning for metastatic disease and she was referred to Heme/Onc. She was seen Hem/onc on 9/15 CXR on 9/15 followed by Chest CT on 9/18 showing LLL lung mass with adenopathy. There were multiple bony lesions noted in the thoracic spine. We were asked to see for lung mass biopsy. She is not hypoxic and is tolerating Room air. She declined significantly over the past 2 months and more so over the past 4 weeks. She can barely walk to the bathroom now. She has lost over 40 pounds, unintentionally. Now s/p bronch with EBUS on 9/21/17 with LLL bronchi extrinsically compressed, most likely from tumor but no obvious endobronchial tumor seen. LN bx + for malignancy - path pending. Subjective:     Currently on o2 at 2 lpm via NC with O2 sat 98%.   Patient with obviously labored breathing at rest.      Current Facility-Administered Medications   Medication Dose Route Frequency    calcium carbonate (TUMS) chewable tablet 200 mg [elemental]  200 mg Oral TID WITH MEALS    senna-docusate (PERICOLACE) 8.6-50 mg per tablet 2 Tab  2 Tab Oral BID    rivaroxaban (XARELTO) tablet 15 mg  15 mg Oral DAILY WITH DINNER    albuterol (PROVENTIL VENTOLIN) nebulizer solution 2.5 mg  2.5 mg Nebulization Q4H PRN    albuterol (PROVENTIL VENTOLIN) nebulizer solution 2.5 mg  2.5 mg Nebulization QID RT    budesonide (PULMICORT) 500 mcg/2 ml nebulizer suspension  500 mcg Nebulization BID RT    HYDROmorphone (PF) (DILAUDID) injection 1 mg  1 mg IntraVENous Q3H PRN    insulin lispro (HUMALOG) injection   SubCUTAneous AC&HS    oxyCODONE IR (ROXICODONE) tablet 5 mg  5 mg Oral Q6H    fentaNYL (DURAGESIC) 25 mcg/hr patch 1 Patch  1 Patch TransDERmal Q72H    polyethylene glycol (MIRALAX) packet 17 g  17 g Oral DAILY    sodium chloride (NS) flush 5-10 mL  5-10 mL IntraVENous Q8H    sodium chloride (NS) flush 5-10 mL  5-10 mL IntraVENous PRN    0.9% sodium chloride infusion  75 mL/hr IntraVENous CONTINUOUS    HYDROcodone-homatropine (HYCODAN) 5-1.5 mg/5 mL (5 mL) syrup 5 mL  5 mL Oral Q4H PRN    magnesium oxide (MAG-OX) tablet 400 mg  400 mg Oral TID    polyethylene glycol (MIRALAX) packet 17 g  17 g Oral DAILY PRN    glyBURIDE (DIABETA) tablet 1.25 mg  1.25 mg Oral BID WITH MEALS    mirtazapine (REMERON) tablet 7.5 mg  7.5 mg Oral QHS    pantoprazole (PROTONIX) tablet 40 mg  40 mg Oral ACB    levothyroxine (SYNTHROID) tablet 125 mcg  125 mcg Oral ACB    ondansetron (ZOFRAN ODT) tablet 8 mg  8 mg Oral Q8H PRN    ondansetron (ZOFRAN) injection 4 mg  4 mg IntraVENous Q4H PRN    sodium chloride (NS) flush 20 mL  20 mL InterCATHeter Q8H    heparin (porcine) pf 600 Units  600 Units InterCATHeter Q8H    sodium chloride (NS) flush 20 mL  20 mL InterCATHeter PRN    heparin (porcine) pf 600 Units  600 Units InterCATHeter PRN    amLODIPine (NORVASC) tablet 5 mg  5 mg Oral DAILY    valsartan (DIOVAN) tablet 320 mg  320 mg Oral DAILY       Review of Systems  Constitutional: negative for fever, chills, sweats  Cardiovascular: negative for chest pain, palpitations, syncope, edema  Gastrointestinal:  negative for dysphagia, reflux, vomiting, diarrhea, abdominal pain, or melena  Neurologic:  negative for focal weakness, numbness, headache    Objective:     Vitals:    09/24/17 0643 09/24/17 0730 09/24/17 1013 09/24/17 1055   BP: 126/59   103/54   Pulse: 88   94   Resp: 18   18   Temp: 98.2 °F (36.8 °C)   98.5 °F (36.9 °C)   SpO2: 99% 96% 96% 98%   Weight:         Intake and Output:   09/22 1901 - 09/24 0700  In: 2614 [P.O.:1220; I.V.:1394]  Out: 450 [Urine:450]  09/24 0701 - 09/24 1900  In: 360 [P.O.:360]  Out: 200 [Urine:200]    Physical Exam:   Constitution:  the patient is well developed and in no acute distress  EENMT:  Sclera clear, pupils equal, oral mucosa moist  Respiratory: scattered wheezing, O2 at 2 lpm - significant increased work of breathing  Cardiovascular:  RRR without M,G,R  Gastrointestinal: slightly firm with mild tenderness to palpation; with positive bowel sounds / tympanic  Musculoskeletal: warm without cyanosis. There is no lower leg edema.   Skin:  no jaundice or rashes, no open wounds   Neurologic: no gross neuro deficits     Psychiatric:  alert and oriented x 3    CXR:   9/15      9/18      LAB  Recent Labs      09/24/17   1110  09/24/17   0645  09/23/17   2015  09/23/17   1631  09/23/17   1114   GLUCPOC  141*  114*  145*  180*  156*      Recent Labs      09/23/17   0405   WBC  9.5   HGB  10.3*   HCT  31.7*   PLT  124*     Recent Labs      09/24/17   0348  09/23/17   0405  09/22/17   0506   NA  140  143  141   K  3.9  3.8  3.9   CL  109*  112*  110*   CO2  22  22  21   GLU  97  125*  169*   BUN  28*  22  18   CREA  2.39*  1.68*  1.30*   MG   --   2.1   --    CA  7.1*  7.8*  8.4   ALB   --    --   2.6*   TBILI   --    --   0.7   ALT   --    --   51   SGOT   --    --   100*       Assessment:  (Medical Decision Making)     Hospital Problems  Date Reviewed: 9/23/2017          Codes Class Noted POA    Controlled type 2 diabetes mellitus without complication, without long-term current use of insulin (HCC) (Chronic) ICD-10-CM: E11.9  ICD-9-CM: 250.00  9/22/2017 Yes    BS range 114-180  diabeta /  SSI      Malignant neoplasm of lower lobe of left lung [facility]) ICD-10-CM: C34.32  ICD-9-CM: 162.5  9/22/2017 Yes    pathology from 4L lymph node was suggestive of lung adenocarcinoma  Patient does not want IV chemo but per Heme/onc notes would be willing to consider oral agent targeted therapy. Would like Hospice presentation to know availability of services      Lung mass ICD-10-CM: R91.8  ICD-9-CM: 786.6  9/20/2017 Yes        Debility (Chronic) ICD-10-CM: R53.81  ICD-9-CM: 799.3  9/20/2017 Yes    Significantly worsening over the last several months      Hypercalcemia ICD-10-CM: E83.52  ICD-9-CM: 275.42  9/15/2017 Yes    Ca++ 7.1 today      ILYA (acute kidney injury) ([facility] [address] ICD-10-CM: N17.9  ICD-9-CM: 584.9  9/15/2017 Yes    Creat  Elevated at 1.68 >>>2.38  Lasix discontinued / continue IVF      * (Principal)Metastasis to bone of unknown primary [facility]) ICD-10-CM: C79.51, C80.1  ICD-9-CM: 198.5, 199.1  9/15/2017 Yes              Plan:  (Medical Decision Making)     --Albuterol / pulmicort  --Duragesic patch / Oxycodone - scheduled for pain and dyspnea  --miralax for constipation  --lasix discontinued yesterday for rising creat. Further elevated today - 1.68 >>>2.39   overall + 12 liters     --NS at 75 ml/hr  --Seen by Hospice with plans for Hospice House admission tomorrow. Discussed with son via conference call in room      More than 50% of the time documented was spent in face-to-face contact with the patient and in the care of the patient on the floor/unit where the patient is located. Amelia Albrecht NP        Lungs:  CTA B anteriorly. Heart:  RRR with no Murmur/Rubs/Gallops    Additional Comments:    Patient c/o ongoing pain and dyspnea. Feels like current regimen not long enough lasting. WIll increase dilaudid to q2h prn from q3. Patient reportedly to go to hospice house tomorrow with stage IV small cell lung cancer.  Pulmonary will sign off but will be available if new issues arise. I have spoken with and examined the patient. I agree with the above assessment and plan as documented.     Noemi Feldman MD

## 2017-09-25 PROBLEM — R05.9 COUGH: Status: ACTIVE | Noted: 2017-01-01

## 2017-09-25 PROBLEM — J32.9 CHRONIC SINUSITIS: Status: ACTIVE | Noted: 2017-01-01

## 2017-09-25 PROBLEM — R06.83 SNORING: Status: ACTIVE | Noted: 2017-01-01

## 2017-09-25 PROBLEM — C34.90 SMALL CELL LUNG CANCER (HCC): Status: ACTIVE | Noted: 2017-01-01

## 2017-09-25 PROBLEM — J30.89 PERENNIAL ALLERGIC RHINITIS: Status: ACTIVE | Noted: 2017-01-01

## 2017-09-25 NOTE — PROGRESS NOTES
Family  Bereavement Risk Scale    Note:   :  Has his own health issues. The patient was his caregiver. He is a dialysis patient. Normal or Uncomplicated Grief  ( Low)  x. Good support ( family/friends/ community of [place])  x. Open expression of Emotions  . Evidence of good coping skills/ History of coping well with loss  . Appropriately tearful (taking cultural background into account)    Grief Normal But Severe ( Moderate)  . Low or no support  . Difficulty Expressing emotions  . History of Difficulty with Loss  . Depression/other mental health challenges  . Unresolved conflict with / with other family members  . Somatic Distress ( Physical symptoms such as pain and fatigue)  Grief with Complicating Factors ( High Risk)  . Severe Depression  . Excessive Guilt  . Multiple Losses  . Other Life Crises  . Suicidal/Homicidal Ideation ( Immediate referral required)  . Substance Abuse  . Excessive Anger  . Unresolved Losses  . History of Difficulty dealing with Loss

## 2017-09-25 NOTE — PROGRESS NOTES
Patient to be discharged to [facility] and primary RN will call report at time of discharge.

## 2017-09-25 NOTE — HOSPICE
[facility]:  Patient desires comfort care rather than rehabilitation, she also does not want chemotherapy. [place] [facility] Her  and son in agreement on the phone with the NP present. Patient approved for [facility]. Family made aware that if patient no longer required symptom control that is required, she would be transferred - they voice understanding and hope that she does. Patient's spouse [facility] signed consents for the patient and both were in agreement for transport [place] [facility] [place]. Please arrange transport tomorrow mid-morning.

## 2017-09-25 NOTE — DISCHARGE SUMMARY
[employer] [facility]: Inpatient Hematology / Oncology Discharge Summary Note    Patient ID:  Jose Juan Trent  [id number]  69 y.o.  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   Metastasis to bone of unknown primary [facility])  Principal Problem:    Metastasis to bone of unknown primary ([facility] (9/15/2017)    Active Problems:    Controlled type 2 diabetes mellitus without complication, without long-term current use of insulin ([facility] (9/22/2017)      Hypercalcemia (9/15/2017)      ILYA (acute kidney injury) ([facility] (9/15/2017)      Lung mass (9/20/2017)      Debility (9/20/2017)      Malignant neoplasm of lower lobe of left lung ([facility] (9/22/2017)        Hospital Course:    Ms. Elias Lopez is a 69 y. o. white female with a history of aortic valve disease s/p  Ross procedure in 1997 with complete AV block post-procedure and pacemaker implantation, paroxysmal atrial fibrillation, stage III chronic kidney disease, type II diabetes, metabolic acidosis, IBS, hypothyroidism s/p total thyroidectomy in 1986, hypertension, hyperlipidemia, vertigo, chronic back pain, GERD, hepatitis A, hysterectomy in 1986, morbid obesity, anemia, osteoarthritis of left knee s/p total knee replacement on 2/28/11, benign left breast lumpectomy and tonsillectomy. She initially presented to PCP on 7/10/2017 reporting a one week history of cold and cough. She stated she had been coughing so much that she had developed rib and back pain. Review of systems and physical exam was consistent with acute bronchitis. She was prescribed Hycodan and inhaled steroids and sent for chest x-ray to rule out atypical pneumonia.  X-ray, completed on 7/12/17, was within normal limits.     Patient was seen urgently by her cardiologist, Dr. Ze Galdamez on 7/19/17 reporting back pain radiating around to her chest worsening with deep breathing, coughing and turning as well as shortness of breath. Echo completed on 8/4/17 showed EF >55%, and cardiac work-up determined that symptoms did not appear to be true cardiac related. Patient requested referral to pulmonology. Repeat chest x-ray was performed on 8/14/17 as well as a nuclear medicine lung ventilation perfusion scan. Chest x-ray showed no acute cardiopulmonary process, and pulmonary perfusion scan was normal with no evidence of acute pulmonary embolism. Of note, patient also underwent bilateral diagnostic screening mammogram on 8/14/17 which showed no suspicious finding in either breast.     Patient was also seen by her nephrologist, Dr. Gogo Ferraro in August of 2017 who ordered bone scan was completed on 8/30/17 demonstrating multifocal radiotracer activity concerning for metastatic disease. CT imaging of the chest, abdomen and pelvis was recommended for further assessment to identify potential primary neoplasm.     Work up revealed likely primary lung CA with associated pathologic rib fxs, ischial fx , thoracolumbar vertebral mets and mediastinal LAD. She was noted to have ILYA with hypercalcemia, thus she was admitted for further work up. Lung biopsy with malignant cells. Patient has decided not to do chemo and wants comfort measures.  She is being discharged to [facility] this am.     Consults:  IP CONSULT TO PHYSIATRIST(REHAB MEDICINE)  IP CONSULT TO PALLIATIVE CARE - PROVIDER  IP CONSULT TO NEPHROLOGY  IP CONSULT TO PULMONOLOGY    Pertinent Diagnostic Studies:   Labs:    Recent Labs      09/25/17   0242  09/23/17   0405   WBC  10.2  9.5   HGB  10.2*  10.3*   PLT  131*  124*   ANEU  5.4  5.6    Recent Labs      09/25/17   0242  09/24/17   0348  09/23/17   0405   NA  138  140  143   K  4.0  3.9  3.8   CL  107  109*  112*   CO2  19*  22  22   GLU  80  97  125*   BUN  34*  28*  22   CREA  2.95*  2.39*  1.68*   CA  7.2*  7.1*  7.8*   SGOT  114*   --    --    AP  257*   -- --    TP  6.2*   --    --    ALB  2.6*   --    --    MG  2.3   --   2.1       Imaging:  [unfilled]    [facility]      OBJECTIVE:  Patient Vitals for the past 8 hrs:   BP Temp Pulse Resp SpO2 Weight   17 0813 - - - - 98 % -   17 0730 90/63 98.6 °F (37 °C) 84 18 98 % -   17 0242 - - - - - 229 lb 12.8 oz (104.2 kg)   17 0241 127/67 98.1 °F (36.7 °C) 99 18 90 % -     Temp (24hrs), Av.1 °F (36.7 °C), Min:97.5 °F (36.4 °C), Max:98.6 °F (37 °C)         Physical Exam:  Constitutional: Well developed, well nourished female in no acute distress, sitting comfortably on the examination table. HEENT: Normocephalic and atraumatic. Oropharynx is clear, mucous membranes are moist.  Pupils are equal, round, and reactive to light. Extraocular muscles are intact. Sclerae anicteric. Neck supple without JVD. No thyromegaly present. Lymph node   No palpable submandibular, cervical, supraclavicular, axillary or inguinal lymph nodes. Skin Warm and dry. No bruising and no rash noted. No erythema. No pallor. Respiratory Lungs are clear to auscultation bilaterally without wheezes, rales or rhonchi, normal air exchange without accessory muscle use. CVS Normal rate, regular rhythm and normal S1 and S2. No murmurs, gallops, or rubs. Abdomen Soft, nontender and nondistended, normoactive bowel sounds. No palpable mass. No hepatosplenomegaly. Neuro Grossly nonfocal with no obvious sensory or motor deficits. MSK Normal range of motion in general.  No edema and no tenderness. Psych Appropriate mood and affect.         ASSESSMENT:    Principal Problem:    Metastasis to bone of unknown primary ([facility] (9/15/2017)    Active Problems:    Controlled type 2 diabetes mellitus without complication, without long-term current use of insulin ([address] (2017)      Hypercalcemia (9/15/2017)      ILYA (acute kidney injury) ([facility] (9/15/2017)      Lung mass (2017)      Debility (2017)      Malignant neoplasm of lower lobe of left lung ([facility] (9/22/2017)      Current Discharge Medication List      STOP taking these medications       HYDROcodone-acetaminophen (NORCO) 5-325 mg per tablet Comments:   Reason for Stopping:         mirtazapine (REMERON) 7.5 mg tablet Comments:   Reason for Stopping:         cyclobenzaprine (FLEXERIL) 5 mg tablet Comments:   Reason for Stopping:         valsartan-hydroCHLOROthiazide (DIOVAN-HCT) 320-25 mg per tablet Comments:   Reason for Stopping:         rivaroxaban (XARELTO) 20 mg tab tablet Comments:   Reason for Stopping:         azelastine (ASTELIN) 137 mcg (0.1 %) nasal spray Comments:   Reason for Stopping:         B.infantis-B.ani-B.long-B.bifi (PROBIOTIC 4X) 10-15 mg TbEC Comments:   Reason for Stopping:         amLODIPine (NORVASC) 5 mg tablet Comments:   Reason for Stopping:         SYNTHROID 125 mcg tablet Comments:   Reason for Stopping:         glyBURIDE (DIABETA) 2.5 mg tablet Comments:   Reason for Stopping:         metFORMIN (GLUMETZA ER) 500 mg TG24 24 hour tablet Comments:   Reason for Stopping:         vitamin B complex (VITAMINS B COMPLEX) capsule Comments:   Reason for Stopping:         multivitamin (ONE A DAY) tablet Comments:   Reason for Stopping:         omeprazole (PRILOSEC) 20 mg capsule Comments:   Reason for Stopping:         omega-3 fatty acids-vitamin e (FISH OIL) 1,000 mg Cap Comments:   Reason for Stopping:         Calcium Carbonate-Vit D3-Min (CALCIUM-VITAMIN D) 600-400 mg-unit Tab Comments:   Reason for Stopping:               DISPOSITION:  Follow-up Appointments   Procedures    FOLLOW UP VISIT Appointment in: Other ([address]) Patient is going to [facility]. We will remain available if needed. Patient is going to [facility]. We will remain available if needed. Standing Status:   Standing     Number of Occurrences:   1     Order Specific Question:   Appointment in     Answer:    Other (Specify)               Fernandez Santos NP  [facility]  [id number] [id number] [facility]  Office : [phone]  Fax : [phone]   Patient seen, examed and discussed with NP, agree with above history/assessment/plan. 69 y. o.female urgently admitted for hypercalcemia and ILYA after first seen in office for bone mets, Cr and calcium recovered after supportive care and imaging showed lung mass biopsy proven SCLC, I updated pt with the extensive stage SCLC and options of palliative chemo vs hospice, she was certainly unhappy with the final news but decided to pursue hospice, arranged as above detailed and will be available as needed. Josh Hopkins M.D.   [address]  Office : [phone]  Fax : [phone]

## 2017-09-25 NOTE — PROGRESS NOTES
Patient Bereavement Risk Scale      Note: Patient lost a child many years ago. She has been the primary caregiver for her  who is on dialysis. Normal or Uncomplicated Grief  ( Low)  x. Good support ( family/friends/ community of cipriano)  . Open expression of Emotions  x. Evidence of good coping skills/ History of coping well with loss  . Appropriately tearful (taking cultural background into account)    Grief Normal But Severe ( Moderate)  . Low or no support  . Difficulty Expressing emotions  . History of Difficulty with Loss  . Depression/other mental health challenges  . Unresolved conflict with / with other family members  . Somatic Distress ( Physical symptoms such as pain and fatigue)  Grief with Complicating Factors ( High Risk)  . Severe Depression  . Excessive Guilt  . Multiple Losses  . Other Life Crises  . Suicidal/Homicidal Ideation ( Immediate referral required)  . Substance Abuse  . Excessive Anger  . Unresolved Losses  . History of Difficulty dealing with Loss

## 2017-09-25 NOTE — PROGRESS NOTES
Patient scheduled for transport to [facility]. Transport scheduled with [employer] for pickup time at 11:00 p.m. Nurse sharmila and [facility] paperwork left in [address] [facility]. Nurse states she will call report.

## 2017-09-25 NOTE — PROGRESS NOTES
TRANSFER - OUT REPORT:    Verbal report given to Tamiko Sewell RN (name) on Sabi Adan  being transferred to [facility] for decline in patient condition. Report consisted of patients Situation, Background, Assessment and   Recommendations(SBAR). Information from the following report(s) SBAR, Kardex, Intake/Output, MAR and Recent Results was reviewed with the receiving nurse. Lines:   PICC Double Lumen 16/63/10 Left;Basilic (Active)   Central Line Being Utilized Yes 9/25/2017  8:13 AM   Criteria for Appropriate Use Limited/no vessel suitable for conventional peripheral access 9/25/2017  8:13 AM   Site Assessment Clean, dry, & intact 9/25/2017  8:13 AM   Phlebitis Assessment 0 9/25/2017  8:13 AM   Infiltration Assessment 0 9/25/2017  8:13 AM   Arm Circumference (cm) 41 cm 9/15/2017  2:00 PM   Date of Last Dressing Change 09/20/17 9/25/2017  8:13 AM   Dressing Status Clean, dry, & intact 9/25/2017  8:13 AM   Action Taken Blood drawn 9/25/2017  2:57 AM   External Catheter Length (cm) 2 centimeters 9/15/2017  5:28 PM   Dressing Type Transparent;Disk with Chlorhexadine gluconate (CHG) 9/25/2017  8:13 AM   Hub Color/Line Status Patent; Flushed 9/25/2017  8:13 AM   Positive Blood Return (Site #1) Yes 9/25/2017  8:13 AM   Hub Color/Line Status Patent; Flushed 9/25/2017  8:13 AM   Positive Blood Return (Site #2) Yes 9/25/2017  8:13 AM   Alcohol Cap Used No 9/24/2017  3:37 AM        Opportunity for questions and clarification was provided. Patient transported with:   O2 @ 2 liters    Family notified of transport time.

## 2017-09-25 NOTE — PROGRESS NOTES
END OF SHIFT NOTE:    Intake/Output      Voiding: YES  Catheter: NO  Drain:              Stool:  0 occurrences. Stool Assessment  Stool Color: Brown (09/19/17 2332)  Stool Appearance: Loose; Soft (09/19/17 2332)  Stool Amount: Small (09/19/17 2332)  Stool Source/Status: Rectum (09/19/17 2332)    Emesis:  0 occurrences. VITAL SIGNS  Patient Vitals for the past 12 hrs:   Temp Pulse Resp BP SpO2   09/24/17 1912 97.5 °F (36.4 °C) 75 18 96/55 90 %   09/24/17 1451 - - - - 96 %   09/24/17 1450 97.8 °F (36.6 °C) (!) 106 18 128/66 96 %   09/24/17 1055 98.5 °F (36.9 °C) 94 18 103/54 98 %   09/24/17 1013 - - - - 96 %       Pain Assessment  Pain 1  Pain Scale 1: Visual (09/24/17 2002)  Pain Intensity 1: 0 (09/24/17 2002)  Patient Stated Pain Goal: 0 (09/24/17 8981)  Pain Reassessment 1: Patient sleeping (09/24/17 2002)  Pain Onset 1: chronic (09/23/17 0717)  Pain Location 1: Generalized (09/24/17 1902)  Pain Orientation 1: Left; Lower (09/23/17 0717)  Pain Description 1: Aching;Constant (09/23/17 1714)  Pain Intervention(s) 1: Medication (see MAR) (09/24/17 1902)    Ambulating  Yes with two person max assist. Pt very SOB. Additional Information:     Shift report given to oncoming nurse at the bedside.     Mike Estrella, RN

## 2017-09-25 NOTE — PROGRESS NOTES
SW met with pt's spouse Stevie and daughter Dalton Haq in the room. Pt is currently obtunded. Stevie and David Rick have been  64 years. They have 3 children. All live out of [facility]. [facility]. They are of the Faith cipriano. Since moving here 15 years ago they have not really connected with a Bahai here. They were not pleased with the offerings here in [place]. They were used to the larger [facility] communities. Stevie states the pt is tough and that even though this is a recent diagnosis they are coping well. He states they have had many health issues and have prepared along the way. We discussed  homes and plans. Stevie states he and the pt recently started talking about this. She wants cremation with no service. He has not looked into that yet. SW will provide community resources on cremation for the family to look at. SW provided emotional support to the family.

## 2017-09-25 NOTE — PROGRESS NOTES
Situation:   Patient comes to us with a diagnosis of Lung and bone cancer. She was admitted for symptom management. Background:  Patient and her  have been  64 years. They have a son in [place], A son in [facility] and a daughter in [place]. She has 5 grandchildren. Patient has  A Latter-day background although she has not been actively involved in [place] in a while. . 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 Assessment:  Patient appears to be sleeping soundly. Her daughter is at bedside. She was able to share a little of the family history. The daughter says she is very involved in her Bahai and finds strength through her cipriano community. She would like the  to speak to her dad and says  may be able to offer as much support as a rabbi might given that her parents are not actively involved in a Bahai at this time. She also asked several questions of a medical nature and  connected her with Carlee Maharaj RN to answer those questions. During this visit  provided support with active listening, compassion, words of comfort and assurance of continued support. Recommendation for Plan of Care:  to provide support for the family through listening and supporting, offering to contact a [id number]. Bereavement:  Patient's  is has health issues of his own. He is taking dialysis.

## 2017-09-25 NOTE — CONSULTS
Consult received and chart reviewed. Patient and family elected to pursue hospice, with plans for transfer to [facility] today. Please re-consult if needs arise.

## 2017-09-25 NOTE — [facility] IDG CHAPLAIN NOTES
Patient: Shreya Garay    Date: 09/25/17  Time: 11:55 AM    [facility]  Notes    Assessment pending for spiritual and bereavement care.         Signed by: Mark Willis

## 2017-09-25 NOTE — PROGRESS NOTES
Presents via EMS from [address] [place] [address] for [facility] admission to Room 124. She is awake, alert, but confused at times. Family verified ID, is able to give some history. Bracelet verified and applied. She c/o pain to her left ribs and states she feels very SOB. Medicated as per MAR. Family is toured, we review safety, visitor accommodation etc.  They ask appropriate questions. They are supportive of patient and each other. Assessment, ESAS, fall risk and sarah completed. She anticipates and assists with activities but is quite SOB. Positioned for comfort. PICC flushes easily. She discusses her inability to get up to the commode and we discuss her inability to roll side to side without respiratory distress. She is agreeable to waiting and trying the bedpan for safety. Family encourages her also to do this. 1230- restful. She states her breathing is much better at this time but RR remains 24, labored. She doesn't feel she has a need for medication. However, she c/o itching. Medicated. 1330- eyes closed. RR 20. Family at the bedside. FLACC 0.     1540- Restful. Eyes closed. RR 22, FLACC 0. Daughter remains at the bedside. 1620- moaning. Repositioned. Oral care provided. She takes sips of water without dysphagia. Her daughter exits and she is medicated. RR 20. She states it hurts her left ribs to breathe. 1710- eyes closed. RR 20, oxygen on. FLACC 0. Unaccompanied. SKin warm, dry. 1750- quite anxious, fearful. She states she can't get the water open, and is pulling at the tissues in the tissue box. Unaccompanied. Reassured, reoriented and medicated. Room darkened and quieted. 1830- RR 20, somewhat labored as previous. Skin warm, dry. Unaccompanied. FLACC 0. Eyes closed.

## 2017-09-26 NOTE — PROGRESS NOTES
Pt has had a resp distress episode this shift. Ativan and morphine were effective. Daughter remained in building most of this shift. Pt at one time (only time she was alert this shift) asked for family to [facility] the room, talking too much\". RN and [place] spoke with family and they were obliging and understanding. Bath to be completed on night shift, [place] and [place], CNA aware; PICC drsg change to be completed.    Report given to Elia Tariq

## 2017-09-26 NOTE — PROGRESS NOTES
LMSW visited with the daughter in the living room. She was sitting out there reading. Pt is resting in the room. Daughter Samuel Domingo states her mom was agitated earlier when her dad was here. She says dad does not know how to let mom rest in quiet. He is very talkative and it agitates her mom. Samuel Domingo says her brothers are coming to help with dd because she feels he is not going to cope well at her death. LMSW validated her need to have her siblings help her. Daughter spent the night with pt last night and she is sad that her periods of wakefulness was filled with agitation when her dad was here. \"Hopefully I will get some more one on one time\". LMSW offered emotional support and active listening and validation for her concerns.

## 2017-09-27 NOTE — PROGRESS NOTES
Progress Note    Patient: Hubert Hunt MRN: [id number]  SSN: [id number]    YOB: 1940  Age: 69 y.o. Sex: female      Admit Date: 9/25/2017    LOS: 2 days     Subjective:     I saw THERESA MEADE on afternoon rounds. Her son and daughter were at bedside. The patient is febrile 99.5 degrees axillary. She has required frequent intravenous doses of morphine for rapid respiration and evident distress. She has also received ipratropium and IV glycopyrrolate for wrestling airway noises. Bladder scan discloses a stable volume of approximately 230 mL at assessment's 8 hours apart. There is been snow spontaneous urination over the past 48 hours. Review of Systems:  Review of systems not obtained due to patient factors. the patient is deeply obtunded and non-responsive to verbal or tactile stimuli. Objective:     Vitals:    09/25/17 1150 09/26/17 0642 09/26/17 1702 09/27/17 0523   BP: 104/56 93/51 116/57 (!) 88/60   Pulse: 100 70 69 (!) 103   Resp: 20 20 16 20   Temp: 99.2 °F (37.3 °C) 98.3 °F (36.8 °C) 99.1 °F (37.3 °C) 99.5 °F (37.5 °C)        Intake and Output:  Current Shift:    Last three shifts:      Physical Exam:   Finds a severely fluid overloaded obese elderly white woman exhibiting classic death rattle. Respiratory rate is 22/m despite a large load of fentanyl and morphine. The hands and feet are very warm. There is no cyanosis or mottling on the extremities. Lab/Data Review:  No new labs resulted in the last 24 hours. it is notable the patient was exhibiting acute renal failure in hospital.      Assessment:     Principal Problem:    Small cell lung cancer ([facility] (9/25/2017)        Plan:     Current Facility-Administered Medications   Medication Dose Route Frequency    morphine injection 4 mg  4 mg SubCUTAneous Q20MIN PRN    Or    morphine injection 4 mg  4 mg IntraVENous Q20MIN PRN    ipratropium (ATROVENT) 0.02 % nebulizer solution 0.5 mg  0.5 mg Nebulization Q4H PRN    haloperidol lactate (HALDOL) injection 2 mg  2 mg IntraVENous Q1H PRN    Or    haloperidol lactate (HALDOL) injection 2 mg  2 mg SubCUTAneous Q1H PRN    acetaminophen (TYLENOL) suppository 650 mg  650 mg Rectal Q3H PRN    bisacodyl (DULCOLAX) suppository 10 mg  10 mg Rectal PRN    sodium chloride (NS) flush 10 mL  10 mL InterCATHeter Q12H    heparin (porcine) pf 300 Units  300 Units InterCATHeter Q12H    sodium chloride (NS) flush 10 mL  10 mL InterCATHeter PRN    heparin (porcine) pf 300 Units  300 Units InterCATHeter PRN    diphenhydrAMINE (BENADRYL) injection 25 mg  25 mg IntraVENous Q6H PRN    albuterol-ipratropium (DUO-NEB) 2.5 MG-0.5 MG/3 ML  3 mL Nebulization Q4H PRN    glycopyrrolate (ROBINUL) injection 0.2 mg  0.2 mg IntraVENous Q4H PRN    LORazepam (ATIVAN) injection 2 mg  2 mg IntraVENous Q10MIN PRN    LORazepam (ATIVAN) injection 1 mg  1 mg IntraVENous Q4H PRN    fentaNYL (DURAGESIC) 25 mcg/hr patch 1 Patch  1 Patch TransDERmal Q72H         I spoke with the son and daughter at bedside regarding natural history of renal failure and respiratory failure. It is unclear whether exhaustion ofher respiratory muscles or accumulation of toxic metabolites leading to terminal arrhythmia will be the cause of the patient's demise. In either case involving fever will accelerate the process. We will not place a Jones catheter as the patient has progressed to end-stage renal failure. Life expectancy at this point is 2-3 days.     Signed By: Roni Reynoso MD     September 27, 2017

## 2017-09-27 NOTE — PROGRESS NOTES
After informing Eugenia Lambert that Artis Darby RN  reported that patient only urinates about once daily, Isabela Olvera stated to leave reece out tonight, document any urine output during night shift and will address need for   reece tomorrow.

## 2017-09-27 NOTE — PROGRESS NOTES
Patient using accessory muscles, having retractions, rr 18, grunting and agitated; not getting much relief with current prn meds. Dr. Rei Aguero notified. Morphine increased to 4mg every 20 minutes prn dyspnea.  See [facility]

## 2017-09-27 NOTE — PROGRESS NOTES
End of shift: Fentanyl 25 mcg remains on left upper arm. Pt has required many doses of morphine for dyspnea, glyco for secretions, atrovent nebs were added and administered for secretions (with slight improvement). Haldol not necessary this shift as Pt has remained non responsive. Inline suctioning has been unsuccessful this shift. Family has remained nearby for this entire shift.    Report to be given to Collins Napier

## 2017-09-27 NOTE — PROGRESS NOTES
Attempted to deep suction Pt with small inline catheter to clear excessive secretions but was not able to withdraw much. All 3 siblings now near the Pt. Extensive talk about what to expect with Daughter and explained that today would be the day to have family visit.

## 2017-09-27 NOTE — H&P
History and Physical    Patient: Chadwick Jiang MRN: [id number]  SSN: [id number]    YOB: 1940  Age: 69 y.o. Sex: female      Subjective:      Chadwick Jiang is a 69year old woman, with a diagnosis of metastatic small cell lung cancer established in the past week. The disease involves, numerous thoracic and lumbar vertebrae and multiple hilar and mediastinal lymph nodes. There is partial collapse of the left lower lobe. The patient has experienced severe thoracic and spinal pain. She is also experiencing severe dyspnea at rest, despite normal arterial o2 saturation. She is requiring large doses of sustained release transdermal fentanyl and large doses of parenteral hydromorphone for break through dyspnea and pain. Her neurologic function has deteriorated drastically over the past two weeks in which she has developed a disorientation anxiety, memory deficit, and generalized gross discoordination of motor function.  CT brain scan was not performed due to physician and patient reluctance to initiate chemotherapy. The patient has elected not to begin chemotherapy which she has been told entails a high risk of near term complications due to her extremely low functional status. Consultant pulmonologist and oncologist feel that her life expectancy is less than four weeks under these circumstances. I concur that inpatient care is necessary at this time to manage severe pain, dyspnea, and delirium. Newly diagnosed hepatic cirrhosis and renal insufficiency are acute  But unrelated problems that worsen her prognosis.           PMH: Hypothyroidism, morbid obesity, pulmonary hypertension, LV diastolic dysfunction associated CHF, hypertensive cardiomyopathy, and type 2 diabetes are chronic, unrelated diagnoses which also adversely affect her prognosis.    .     Past Medical History:   Diagnosis Date    Aortic stenosis     AVR 1997    AV block, 3rd degree ([facility] [address] 2/13/2016    Cardiac pacemaker     Chest pain     Chronic kidney disease (CKD), stage III (moderate)     Chronic pain      lower back    Diabetes mellitus, type II ([address]     GERD (gastroesophageal reflux disease)     controlled with medication    Hepatitis A     dx after hysterectomy 1986 - pt states no problems now    Hyperlipidemia     Hypertension     controlled with medication    Hypertension, essential     hypothyroidism     hx of total thyroidectomy in 1986    IBS (irritable bowel syndrome)     pt states has hx of diverticulitis     Morbid obesity ([address]     bmi=40    Osteoarthritis of left knee 2/28/2011    Paroxysmal atrial fibrillation ([address]     S/P total knee replacement using cement 2/28/2011    S/P total knee replacement using cement 2/28/2011    Vertigo, benign positional      Past Surgical History:   Procedure Laterality Date    CARDIAC SURG PROCEDURE UNLIST  1997    mitral valve replacement \"Ross Procedure\" per pt    HX BREAST LUMPECTOMY  1993    left breast - benign per pt    HX HYSTERECTOMY  1986    HX ORTHOPAEDIC  2/2011    left knee replacement    HX OTHER SURGICAL  1996    basal cell carcinoma removed from face   [facility] & 2008    pacemaker placed in 1997 due to \"complete heart block\" after mitral valve replacement - 100 % dependant on pacemaker    HX TONSILLECTOMY  as a child    THYROIDECTOMY  1986    total      Family History   Problem Relation Age of Onset    Cancer Mother      breast    Breast Cancer Mother     Heart Attack Father     Heart Failure Father     Kidney Disease Father     Cancer Maternal Aunt      Breast CA    Breast Cancer Maternal Aunt     Cancer Maternal Grandmother      Breast CA    Breast Cancer Maternal Grandmother     Heart Disease Maternal Aunt      Social History   Substance Use Topics    Smoking status: Never Smoker    Smokeless tobacco: Never Used    Alcohol use No      Prior to Admission medications    Medication Sig Start Date End Date Taking? Authorizing Provider   amLODIPine (NORVASC) 5 mg tablet Take 2.5 mg by mouth daily. 7/24/17  Yes Historical Provider   glyBURIDE (DIABETA) 2.5 mg tablet Take 1.25 mg by mouth two (2) times daily (with meals). 9/10/17  Yes Historical Provider   HYDROcodone-acetaminophen (NORCO) 5-325 mg per tablet Take 1 Tab by mouth every six (6) hours as needed for Pain. 9/14/17  Yes Historical Provider   mirtazapine (REMERON) 7.5 mg tablet Take 7.5 mg by mouth nightly. 9/14/17  Yes Historical Provider   omeprazole (PRILOSEC) 20 mg capsule Take 20 mg by mouth daily. 8/7/17  Yes Historical Provider   levothyroxine (SYNTHROID) 125 mcg tablet Take 125 mcg by mouth Daily (before breakfast). 6/19/17  Yes Historical Provider   valsartan-hydroCHLOROthiazide (DIOVAN-HCT) 320-25 mg per tablet Take 1 Tab by mouth daily. 8/29/17  Yes Historical Provider   rivaroxaban (XARELTO) 15 mg tab tablet Take 15 mg by mouth daily (with dinner). Yes Historical Provider   ondansetron (ZOFRAN ODT) 8 mg disintegrating tablet Take 8 mg by mouth every eight (8) hours as needed for Nausea. Yes Historical Provider        No Known Allergies    Review of Systems:  Review of systems not obtained due to patient factors. the patient is deeply obtunded but can be raised to consciousness for 10 to 20 second intervals. she could not respond meaningfully to questions. Objective:     Vitals:    09/25/17 1150 09/26/17 0642 09/26/17 1702 09/27/17 0523   BP: 104/56 93/51 116/57 (!) 88/60   Pulse: 100 70 69 (!) 103   Resp: 20 20 16 20   Temp: 99.2 °F (37.3 °C) 98.3 °F (36.8 °C) 99.1 °F (37.3 °C) 99.5 °F (37.5 °C)        Physical Exam:findings in obese septuagenarian white woman who is deeply obtunded and only transiently responsive to loud voice and touch. She exhibits increased respiratory effort with tachypnea and some stridor. Low-grade fever is present. Patient's heart is pacing at 70 bpm.  Lungs are marked by course rhonchi in the left anterior central region. Tidal volume is grossly reduced. Heart tones are regular there are no mechanical heart tones. (Biomechanical prosthesis aortic valve). Abdomen is obese without focal tenderness or gaseous distention. The patient has 3+ edema of the upper extremities and 2+ edema of the lower extremities. There is no gross cyanosis in evidence.       Assessment:     Hospital Problems  Date Reviewed: 9/25/2017          Codes Class Noted POA    * (Principal)Small cell lung cancer [facility]) ICD-10-CM: C34.90  ICD-9-CM: 162.9  9/25/2017 Unknown              Plan:     Current Facility-Administered Medications   Medication Dose Route Frequency    morphine injection 4 mg  4 mg SubCUTAneous Q20MIN PRN    Or    morphine injection 4 mg  4 mg IntraVENous Q20MIN PRN    ipratropium (ATROVENT) 0.02 % nebulizer solution 0.5 mg  0.5 mg Nebulization Q4H PRN    haloperidol lactate (HALDOL) injection 2 mg  2 mg IntraVENous Q1H PRN    Or    haloperidol lactate (HALDOL) injection 2 mg  2 mg SubCUTAneous Q1H PRN    acetaminophen (TYLENOL) suppository 650 mg  650 mg Rectal Q3H PRN    bisacodyl (DULCOLAX) suppository 10 mg  10 mg Rectal PRN    sodium chloride (NS) flush 10 mL  10 mL InterCATHeter Q12H    heparin (porcine) pf 300 Units  300 Units InterCATHeter Q12H    sodium chloride (NS) flush 10 mL  10 mL InterCATHeter PRN    heparin (porcine) pf 300 Units  300 Units InterCATHeter PRN    diphenhydrAMINE (BENADRYL) injection 25 mg  25 mg IntraVENous Q6H PRN    albuterol-ipratropium (DUO-NEB) 2.5 MG-0.5 MG/3 ML  3 mL Nebulization Q4H PRN    glycopyrrolate (ROBINUL) injection 0.2 mg  0.2 mg IntraVENous Q4H PRN    LORazepam (ATIVAN) injection 2 mg  2 mg IntraVENous Q10MIN PRN    LORazepam (ATIVAN) injection 1 mg  1 mg IntraVENous Q4H PRN    fentaNYL (DURAGESIC) 25 mcg/hr patch 1 Patch  1 Patch TransDERmal Q72H       The patient has elected not to begin chemotherapy which she has been told entails a high risk of near term complications due to her extremely low functional status. Consultant pulmonologist and oncologist feel that her life expectancy is less than four weeks under these circumstances. She will require parenteral opioid via central line for chest pain and dyspnea. Based on the use of  Opioid in the hospital, I have ordered fentanyl 25 MCG per minute patch for basal opioid infusion. The compromise of alertness and ability to ingest sustainable volume of food and fluid calories her medication is the principle drive for  life expectancy  Less than initially anticipated. If a pulmonary infection recrudescence occurs, life expectancy is probably less than 1 week. I spoke at length with the patient's spouse and daughter at bedside. I reviewed active comfort measures and expected natural history of her aggressive lung cancer in the setting of congestive heart failure.   Signed By: Tomy Camarena MD     September 27, 2017

## 2017-09-28 NOTE — PROGRESS NOTES
Progress Note    Patient: Christa Schaumann MRN: [id number]  SSN: [id number]    YOB: 1940  Age: 69 y.o. Sex: female      Admit Date: 9/25/2017    LOS: 3 days     Clinical Summary:     Ms. Ramos  is unresponsive today and has requiring frequent but low doses of morphine for respiratory distress. Examination reveals a moderately obese female with her head to the side and loss of upper airway partially obstructive sounds as well as lots of mucus in the upper airways congestion in the lower airways. There is no true respiratory distress, no cyanosis and no mottling. Vital signs are as outlined. There are no areas of tenderness over her thorax abdomen or extremities. Vitals:    09/26/17 1702 09/27/17 0523 09/27/17 1530 09/28/17 0611   BP: 116/57 (!) 88/60 95/52 (!) 85/42   Pulse: 69 (!) 103 (!) 101 86   Resp: 16 20 20 14   Temp: 99.1 °F (37.3 °C) 99.5 °F (37.5 °C) (!) 101.5 °F (38.6 °C) 97.8 °F (36.6 °C)            Clinical Assessment:     Principal Problem:    Small cell lung cancer (HCC) (9/25/2017)        Treatment Plan:      I have reviewed the patient's Plan of Care with the nursing staff. I reviewed the situation with the patient's son. He recognizes that she is generally comfortable despite her respiratory noises. I do think it is reasonable to increase the MORPHINE dose to 8 mg. The former GLYCOPYRROLATE dose seemed to make her mucus to dry so we will try half this dose and we can omit her DuoNeb's. This is likely in the next few days.           Current Facility-Administered Medications   Medication Dose Route Frequency    morphine injection 8 mg  8 mg IntraVENous Q1H PRN    ipratropium (ATROVENT) 0.02 % nebulizer solution 0.5 mg  0.5 mg Nebulization Q4H PRN    haloperidol lactate (HALDOL) injection 2 mg  2 mg IntraVENous Q1H PRN    Or    haloperidol lactate (HALDOL) injection 2 mg  2 mg SubCUTAneous Q1H PRN    acetaminophen (TYLENOL) suppository 650 mg  650 mg Rectal Q3H PRN  bisacodyl (DULCOLAX) suppository 10 mg  10 mg Rectal PRN    sodium chloride (NS) flush 10 mL  10 mL InterCATHeter Q12H    heparin (porcine) pf 300 Units  300 Units InterCATHeter Q12H    sodium chloride (NS) flush 10 mL  10 mL InterCATHeter PRN    heparin (porcine) pf 300 Units  300 Units InterCATHeter PRN    diphenhydrAMINE (BENADRYL) injection 25 mg  25 mg IntraVENous Q6H PRN    albuterol-ipratropium (DUO-NEB) 2.5 MG-0.5 MG/3 ML  3 mL Nebulization Q4H PRN    glycopyrrolate (ROBINUL) injection 0.2 mg  0.2 mg IntraVENous Q4H PRN    LORazepam (ATIVAN) injection 2 mg  2 mg IntraVENous Q10MIN PRN    LORazepam (ATIVAN) injection 1 mg  1 mg IntraVENous Q4H PRN    fentaNYL (DURAGESIC) 25 mcg/hr patch 1 Patch  1 Patch TransDERmal Q72H           Signed By: Tiki Eastman MD     September 28, 2017

## 2017-09-28 NOTE — PROGRESS NOTES
Patient found unresponsive to verbal / tactile / painful stimuli. Absence of blood pressure, apical pulse, and respirations.

## 2017-09-28 NOTE — PROGRESS NOTES
Mrs. Lore Chávez passed peacefully at 12:09pm with family at bedside. Declined  support.  Family was grieving appropriately when they left facility at 12:46pm.

## 2017-10-27 NOTE — [facility] IDG BEREAVEMENT NOTES
Patient death and family bereavement needs discussed at [facility]. Bereavement risk factors indicate a bereavement risk score of MODERATE . Bereavement follow up to be provided accordingly.
